# Patient Record
Sex: FEMALE | Race: WHITE | NOT HISPANIC OR LATINO | Employment: OTHER | URBAN - METROPOLITAN AREA
[De-identification: names, ages, dates, MRNs, and addresses within clinical notes are randomized per-mention and may not be internally consistent; named-entity substitution may affect disease eponyms.]

---

## 2017-01-03 ENCOUNTER — HOSPITAL ENCOUNTER (OUTPATIENT)
Dept: ULTRASOUND IMAGING | Facility: HOSPITAL | Age: 77
Discharge: HOME | End: 2017-01-03
Attending: INTERNAL MEDICINE

## 2017-01-03 ENCOUNTER — HOSPITAL ENCOUNTER (OUTPATIENT)
Dept: MAMMOGRAPHY | Facility: HOSPITAL | Age: 77
Discharge: HOME | End: 2017-01-03
Attending: INTERNAL MEDICINE | Admitting: INTERNAL MEDICINE

## 2017-01-03 DIAGNOSIS — N64.4 BREAST PAIN, LEFT: ICD-10-CM

## 2017-01-03 PROCEDURE — G0206 DX MAMMO INCL CAD UNI: HCPCS

## 2017-01-03 PROCEDURE — 76642 ULTRASOUND BREAST LIMITED: CPT

## 2017-01-16 DIAGNOSIS — N64.4 BREAST PAIN: Primary | ICD-10-CM

## 2017-03-03 ENCOUNTER — LAB (OUTPATIENT)
Dept: ONCOLOGY | Facility: HOSPITAL | Age: 77
End: 2017-03-03

## 2017-03-03 ENCOUNTER — OFFICE VISIT (OUTPATIENT)
Dept: ONCOLOGY | Facility: CLINIC | Age: 77
End: 2017-03-03

## 2017-03-03 VITALS
HEIGHT: 58 IN | DIASTOLIC BLOOD PRESSURE: 86 MMHG | SYSTOLIC BLOOD PRESSURE: 140 MMHG | TEMPERATURE: 98.5 F | HEART RATE: 71 BPM | RESPIRATION RATE: 12 BRPM | BODY MASS INDEX: 26.45 KG/M2 | OXYGEN SATURATION: 99 % | WEIGHT: 126 LBS

## 2017-03-03 DIAGNOSIS — D75.89 MACROCYTOSIS: ICD-10-CM

## 2017-03-03 DIAGNOSIS — D72.820 MONOCLONAL B-CELL LYMPHOCYTOSIS: ICD-10-CM

## 2017-03-03 DIAGNOSIS — D72.820 MONOCLONAL B-CELL LYMPHOCYTOSIS: Primary | ICD-10-CM

## 2017-03-03 LAB
ALBUMIN SERPL-MCNC: 4.2 G/DL (ref 3.5–5.2)
ALBUMIN/GLOB SERPL: 1.6 G/DL
ALP SERPL-CCNC: 42 U/L (ref 39–117)
ALT SERPL W P-5'-P-CCNC: 12 U/L (ref 1–33)
ANION GAP SERPL CALCULATED.3IONS-SCNC: 11.4 MMOL/L
AST SERPL-CCNC: 16 U/L (ref 1–32)
BASOPHILS # BLD AUTO: 0.05 10*3/MM3 (ref 0–0.2)
BASOPHILS NFR BLD AUTO: 0.7 % (ref 0–1.5)
BILIRUB SERPL-MCNC: 0.6 MG/DL (ref 0.1–1.2)
BUN BLD-MCNC: 16 MG/DL (ref 8–23)
BUN/CREAT SERPL: 15 (ref 7–25)
CALCIUM SPEC-SCNC: 9.9 MG/DL (ref 8.6–10.5)
CHLORIDE SERPL-SCNC: 103 MMOL/L (ref 98–107)
CO2 SERPL-SCNC: 27.6 MMOL/L (ref 22–29)
CREAT BLD-MCNC: 1.07 MG/DL (ref 0.57–1)
DEPRECATED RDW RBC AUTO: 47.1 FL (ref 37–54)
EOSINOPHIL # BLD AUTO: 0.08 10*3/MM3 (ref 0–0.7)
EOSINOPHIL NFR BLD AUTO: 1.1 % (ref 0.3–6.2)
ERYTHROCYTE [DISTWIDTH] IN BLOOD BY AUTOMATED COUNT: 12.4 % (ref 11.7–13)
GFR SERPL CREATININE-BSD FRML MDRD: 50 ML/MIN/1.73
GLOBULIN UR ELPH-MCNC: 2.7 GM/DL
GLUCOSE BLD-MCNC: 83 MG/DL (ref 65–99)
HCT VFR BLD AUTO: 46.4 % (ref 35.6–45.5)
HGB BLD-MCNC: 15.4 G/DL (ref 11.9–15.5)
IMM GRANULOCYTES # BLD: 0.03 10*3/MM3 (ref 0–0.03)
IMM GRANULOCYTES NFR BLD: 0.4 % (ref 0–0.5)
LYMPHOCYTES # BLD AUTO: 3.67 10*3/MM3 (ref 0.9–4.8)
LYMPHOCYTES NFR BLD AUTO: 52.7 % (ref 19.6–45.3)
MCH RBC QN AUTO: 34.3 PG (ref 26.9–32)
MCHC RBC AUTO-ENTMCNC: 33.2 G/DL (ref 32.4–36.3)
MCV RBC AUTO: 103.3 FL (ref 80.5–98.2)
MONOCYTES # BLD AUTO: 0.58 10*3/MM3 (ref 0.2–1.2)
MONOCYTES NFR BLD AUTO: 8.3 % (ref 5–12)
NEUTROPHILS # BLD AUTO: 2.55 10*3/MM3 (ref 1.9–8.1)
NEUTROPHILS NFR BLD AUTO: 36.8 % (ref 42.7–76)
NRBC BLD MANUAL-RTO: 0 /100 WBC (ref 0–0)
PLATELET # BLD AUTO: 195 10*3/MM3 (ref 140–500)
PMV BLD AUTO: 10.3 FL (ref 6–12)
POTASSIUM BLD-SCNC: 4 MMOL/L (ref 3.5–5.2)
PROT SERPL-MCNC: 6.9 G/DL (ref 6–8.5)
RBC # BLD AUTO: 4.49 10*6/MM3 (ref 3.9–5.2)
SODIUM BLD-SCNC: 142 MMOL/L (ref 136–145)
WBC NRBC COR # BLD: 6.96 10*3/MM3 (ref 4.5–10.7)

## 2017-03-03 PROCEDURE — 80053 COMPREHEN METABOLIC PANEL: CPT | Performed by: INTERNAL MEDICINE

## 2017-03-03 PROCEDURE — 85025 COMPLETE CBC W/AUTO DIFF WBC: CPT | Performed by: INTERNAL MEDICINE

## 2017-03-03 PROCEDURE — 99213 OFFICE O/P EST LOW 20 MIN: CPT | Performed by: INTERNAL MEDICINE

## 2017-03-03 PROCEDURE — 36415 COLL VENOUS BLD VENIPUNCTURE: CPT

## 2017-03-03 RX ORDER — FAMOTIDINE 20 MG/1
20 TABLET, FILM COATED ORAL 2 TIMES DAILY PRN
COMMUNITY
End: 2017-07-13

## 2017-03-03 NOTE — PROGRESS NOTES
Subjective     REASON FOR FOLLOW UP   B-cell monoclonal population in PB by flow  Macrocytosis                             HISTORY OF PRESENT ILLNESS:  The patient is a 76 y.o. year old female who is here for routine 6 month follow-up of an extremely early lymphoproliferative process with 16% monoclonal B cells in the bone marrow.  She is not on any treatment and seems to be doing well.  Her blood counts are stable with a normal white count with mild peripheral lymphocytosis.  She's had no lymphadenopathy no fevers chills or night sweats.   History of Present Illness     Past Medical History   Diagnosis Date   • Arthritis    • Bacterial vaginitis    • Bronchitis    • Colon polyps    • Dizziness    • Elevated lipids    • Fatigue    • GERD (gastroesophageal reflux disease)    • Headache    • Hematuria    • History of EKG      date?; borderline   • Hypovitaminosis D    • Lump, breast      Unable to remember which breast   • Macrocytosis    • Oral candidiasis    • Osteoporosis    • Postmenopausal bleeding    • Suprapubic tenderness    • Vaginal atrophy    • Yeast vaginitis         Past Surgical History   Procedure Laterality Date   • Foot surgery Right 1999   • Gum surgery  1997   • Breast surgery Left 2003     [facility]   • Cataract extraction  2009   • Breast biopsy Left      12-15 years ago in Dr. Hendricks's office        Current Outpatient Prescriptions on File Prior to Visit   Medication Sig Dispense Refill   • alendronate (FOSAMAX) 70 MG tablet Take 1 tablet by mouth Every 7 (Seven) Days. 12 tablet 3   • calcium carbonate-vitamin d 600-400 MG-UNIT per tablet Take 1 tablet by mouth daily.     • [DISCONTINUED] ranitidine (ZANTAC) 150 MG tablet Take 150 mg by mouth daily.       No current facility-administered medications on file prior to visit.         ALLERGIES:  No Known Allergies     Review of Systems   Constitutional: Negative for activity change, appetite change, fatigue, fever and unexpected weight change.   HENT:  "Negative for hearing loss, nosebleeds, trouble swallowing and voice change.    Eyes: Negative for visual disturbance.   Respiratory: Negative for cough, shortness of breath and wheezing.    Cardiovascular: Negative for chest pain and palpitations.   Gastrointestinal: Negative for abdominal pain, diarrhea, nausea and vomiting.   Genitourinary: Negative for difficulty urinating, frequency, hematuria and urgency.   Musculoskeletal: Negative for back pain and neck pain.   Skin: Negative for rash.   Neurological: Negative for dizziness, seizures, syncope and headaches.   Hematological: Negative for adenopathy. Does not bruise/bleed easily.   Psychiatric/Behavioral: Negative for behavioral problems. The patient is not nervous/anxious.         Objective     Vitals:    03/03/17 1012   BP: 140/86   Pulse: 71   Resp: 12   Temp: 98.5 °F (36.9 °C)   SpO2: 99%   Weight: 126 lb (57.2 kg)   Height: 57.68\" (146.5 cm)  Comment: NEW HT   PainSc: 0-No pain     Current Status 3/3/2017   ECOG score 0       Physical Exam   Constitutional: She is oriented to person, place, and time. She appears well-developed and well-nourished. No distress.   HENT:   Head: Normocephalic.   Eyes: Conjunctivae and EOM are normal. Pupils are equal, round, and reactive to light. No scleral icterus.   Neck: Normal range of motion. Neck supple. No JVD present. No thyromegaly present.   Cardiovascular: Normal rate and regular rhythm.  Exam reveals no gallop and no friction rub.    No murmur heard.  Pulmonary/Chest: Effort normal and breath sounds normal. She has no wheezes. She has no rales.   Abdominal: Soft. She exhibits no distension and no mass. There is no tenderness.   Musculoskeletal: Normal range of motion. She exhibits no edema or deformity.   Lymphadenopathy:     She has no cervical adenopathy.   Neurological: She is alert and oriented to person, place, and time. She has normal reflexes. No cranial nerve deficit.   Skin: Skin is warm and dry. No rash " noted. No erythema.   Psychiatric: She has a normal mood and affect. Her behavior is normal. Judgment normal.         RECENT LABS:  Hematology WBC   Date Value Ref Range Status   03/03/2017 6.96 4.50 - 10.70 10*3/mm3 Final   09/15/2015 8.60 4.50 - 10.70 K/Cumm Final     RBC   Date Value Ref Range Status   03/03/2017 4.49 3.90 - 5.20 10*6/mm3 Final   09/15/2015 4.23 3.90 - 5.20 Million Final     HEMOGLOBIN   Date Value Ref Range Status   03/03/2017 15.4 11.9 - 15.5 g/dL Final   09/15/2015 14.4 11.9 - 15.5 g/dL Final     HEMATOCRIT   Date Value Ref Range Status   03/03/2017 46.4 (H) 35.6 - 45.5 % Final   09/15/2015 42.2 35.6 - 45.5 % Final     PLATELETS   Date Value Ref Range Status   03/03/2017 195 140 - 500 10*3/mm3 Final   09/15/2015 222 140 - 500 K/Cumm Final      FLOW CYTOMETRY 8/16/16:  Small monoclonal B-cell population ( 16% ).     Assessment/Plan   1.  Mild chronic macrocytosis.  Stable  2.  Peripheral blood flow cytometry showing a very small (16%) monoclonal B-cell population.  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  The patient does not have any palpable lymphadenopathy at all and is having no symptoms.  Her blood counts are normal with the exception of mild peripheral lymphocytosis.    Plan  1.  Return in 6 months for routine follow-up including repeat CBC and serum chemistries.

## 2017-05-22 ENCOUNTER — OFFICE VISIT (OUTPATIENT)
Dept: FAMILY MEDICINE CLINIC | Facility: CLINIC | Age: 77
End: 2017-05-22

## 2017-05-22 VITALS
WEIGHT: 123.6 LBS | HEART RATE: 80 BPM | OXYGEN SATURATION: 99 % | TEMPERATURE: 98.7 F | HEIGHT: 60 IN | SYSTOLIC BLOOD PRESSURE: 120 MMHG | RESPIRATION RATE: 16 BRPM | DIASTOLIC BLOOD PRESSURE: 88 MMHG | BODY MASS INDEX: 24.26 KG/M2

## 2017-05-22 DIAGNOSIS — L03.211 CELLULITIS OF FACE: ICD-10-CM

## 2017-05-22 DIAGNOSIS — Z00.00 MEDICARE ANNUAL WELLNESS VISIT, INITIAL: Primary | ICD-10-CM

## 2017-05-22 PROCEDURE — G0439 PPPS, SUBSEQ VISIT: HCPCS | Performed by: NURSE PRACTITIONER

## 2017-05-22 PROCEDURE — 99213 OFFICE O/P EST LOW 20 MIN: CPT | Performed by: NURSE PRACTITIONER

## 2017-05-22 RX ORDER — CLINDAMYCIN HYDROCHLORIDE 300 MG/1
300 CAPSULE ORAL 3 TIMES DAILY
Qty: 30 CAPSULE | Refills: 0 | Status: SHIPPED | OUTPATIENT
Start: 2017-05-22 | End: 2017-07-13

## 2017-05-25 ENCOUNTER — TELEPHONE (OUTPATIENT)
Dept: FAMILY MEDICINE CLINIC | Facility: CLINIC | Age: 77
End: 2017-05-25

## 2017-05-25 DIAGNOSIS — L53.9 FACIAL ERYTHEMA: Primary | ICD-10-CM

## 2017-05-25 RX ORDER — DIAPER,BRIEF,INFANT-TODD,DISP
EACH MISCELLANEOUS 2 TIMES DAILY
Qty: 30 G | Refills: 0 | Status: SHIPPED | OUTPATIENT
Start: 2017-05-25 | End: 2017-07-13 | Stop reason: ALTCHOICE

## 2017-07-13 ENCOUNTER — OFFICE VISIT (OUTPATIENT)
Dept: GASTROENTEROLOGY | Facility: CLINIC | Age: 77
End: 2017-07-13

## 2017-07-13 VITALS
HEIGHT: 60 IN | DIASTOLIC BLOOD PRESSURE: 72 MMHG | SYSTOLIC BLOOD PRESSURE: 165 MMHG | HEART RATE: 77 BPM | WEIGHT: 120 LBS | BODY MASS INDEX: 23.56 KG/M2

## 2017-07-13 DIAGNOSIS — R53.81 MALAISE AND FATIGUE: ICD-10-CM

## 2017-07-13 DIAGNOSIS — R53.83 MALAISE AND FATIGUE: ICD-10-CM

## 2017-07-13 DIAGNOSIS — R12 HEARTBURN: Primary | ICD-10-CM

## 2017-07-13 DIAGNOSIS — R19.7 DIARRHEA, UNSPECIFIED TYPE: ICD-10-CM

## 2017-07-13 DIAGNOSIS — R14.0 BLOATING: ICD-10-CM

## 2017-07-13 PROCEDURE — 99213 OFFICE O/P EST LOW 20 MIN: CPT | Performed by: INTERNAL MEDICINE

## 2017-07-25 ENCOUNTER — TELEPHONE (OUTPATIENT)
Dept: GASTROENTEROLOGY | Facility: CLINIC | Age: 77
End: 2017-07-25

## 2017-07-25 NOTE — TELEPHONE ENCOUNTER
Mailed pt reminder letter/lab order to have C Diff, and Stool Occult tests ordered at last visit with Dr eMndez

## 2017-08-07 ENCOUNTER — TELEPHONE (OUTPATIENT)
Dept: GASTROENTEROLOGY | Facility: CLINIC | Age: 77
End: 2017-08-07

## 2017-08-07 ENCOUNTER — LAB (OUTPATIENT)
Dept: LAB | Facility: HOSPITAL | Age: 77
End: 2017-08-07

## 2017-08-07 DIAGNOSIS — R53.81 MALAISE AND FATIGUE: ICD-10-CM

## 2017-08-07 DIAGNOSIS — R14.0 BLOATING: ICD-10-CM

## 2017-08-07 DIAGNOSIS — R19.7 DIARRHEA, UNSPECIFIED TYPE: ICD-10-CM

## 2017-08-07 DIAGNOSIS — R53.83 MALAISE AND FATIGUE: ICD-10-CM

## 2017-08-07 DIAGNOSIS — R12 HEARTBURN: ICD-10-CM

## 2017-08-07 LAB
C DIFF TOX GENS STL QL NAA+PROBE: NEGATIVE
HEMOCCULT STL QL: NEGATIVE

## 2017-08-07 PROCEDURE — 82272 OCCULT BLD FECES 1-3 TESTS: CPT

## 2017-08-07 PROCEDURE — 87493 C DIFF AMPLIFIED PROBE: CPT

## 2017-08-07 NOTE — TELEPHONE ENCOUNTER
LVM with pt regarding normal stool studies (occult blood, C Diff) as reviewed by Dr Mendez.  Pt has f/u appt 8/17/17

## 2017-08-07 NOTE — TELEPHONE ENCOUNTER
----- Message from Kevin Mendez MD sent at 8/7/2017  3:06 PM EDT -----  Advise patient the results were normal.

## 2017-08-17 ENCOUNTER — OFFICE VISIT (OUTPATIENT)
Dept: GASTROENTEROLOGY | Facility: CLINIC | Age: 77
End: 2017-08-17

## 2017-08-17 VITALS
HEART RATE: 66 BPM | DIASTOLIC BLOOD PRESSURE: 86 MMHG | WEIGHT: 118 LBS | BODY MASS INDEX: 23.16 KG/M2 | HEIGHT: 60 IN | SYSTOLIC BLOOD PRESSURE: 155 MMHG

## 2017-08-17 DIAGNOSIS — K21.9 GASTROESOPHAGEAL REFLUX DISEASE, ESOPHAGITIS PRESENCE NOT SPECIFIED: Primary | ICD-10-CM

## 2017-08-17 DIAGNOSIS — R19.7 DIARRHEA, UNSPECIFIED TYPE: ICD-10-CM

## 2017-08-17 PROCEDURE — 99213 OFFICE O/P EST LOW 20 MIN: CPT | Performed by: INTERNAL MEDICINE

## 2017-08-17 NOTE — PROGRESS NOTES
Subjective   Tessie Mckeon is a 76 y.o. female is here today for follow-up.  Chief Complaint   Patient presents with   • Heartburn   • Bloated     History of Present Illness  Patient is doing much better.  Her diarrhea has resolved.  Her arthralgias have resolved.  Her heartburn is under acceptable control although she experiences occasional breakthrough symptoms late in the evening.  Hemoccult testing of her stool was negative.  The following portions of the patient's history were reviewed and updated as appropriate: allergies, current medications, past family history, past medical history, past social history, past surgical history and problem list.    Review of Systems   Respiratory: Negative for shortness of breath.    Cardiovascular: Negative for chest pain.   All other systems reviewed and are negative.      Objective   Physical Exam   Constitutional: She appears well-developed and well-nourished.   Nursing note and vitals reviewed.        Assessment/Plan   Problems Addressed this Visit        Digestive    GERD (gastroesophageal reflux disease) - Primary    Relevant Medications    famotidine-calcium carb-mag hydroxide (PEPCID COMPLETE) -165 MG chewable tablet    Diarrhea        The above symptoms have responded to conservative management and have resolved for now.  I would leave her be and see her back on an as-needed basis.

## 2017-09-14 ENCOUNTER — TELEPHONE (OUTPATIENT)
Dept: ONCOLOGY | Facility: HOSPITAL | Age: 77
End: 2017-09-14

## 2017-09-15 ENCOUNTER — OFFICE VISIT (OUTPATIENT)
Dept: ONCOLOGY | Facility: CLINIC | Age: 77
End: 2017-09-15

## 2017-09-15 ENCOUNTER — LAB (OUTPATIENT)
Dept: OTHER | Facility: HOSPITAL | Age: 77
End: 2017-09-15

## 2017-09-15 VITALS
HEART RATE: 71 BPM | RESPIRATION RATE: 16 BRPM | OXYGEN SATURATION: 99 % | BODY MASS INDEX: 26.24 KG/M2 | WEIGHT: 125 LBS | TEMPERATURE: 98.5 F | SYSTOLIC BLOOD PRESSURE: 147 MMHG | HEIGHT: 58 IN | DIASTOLIC BLOOD PRESSURE: 83 MMHG

## 2017-09-15 DIAGNOSIS — D75.89 MACROCYTOSIS: Primary | ICD-10-CM

## 2017-09-15 DIAGNOSIS — D72.820 MONOCLONAL B-CELL LYMPHOCYTOSIS: ICD-10-CM

## 2017-09-15 LAB
ALBUMIN SERPL-MCNC: 4 G/DL (ref 3.5–5.2)
ALBUMIN/GLOB SERPL: 1.5 G/DL
ALP SERPL-CCNC: 40 U/L (ref 39–117)
ALT SERPL W P-5'-P-CCNC: 11 U/L (ref 1–33)
ANION GAP SERPL CALCULATED.3IONS-SCNC: 11.3 MMOL/L
AST SERPL-CCNC: 16 U/L (ref 1–32)
BASOPHILS # BLD AUTO: 0.05 10*3/MM3 (ref 0–0.2)
BASOPHILS NFR BLD AUTO: 0.7 % (ref 0–1.5)
BILIRUB SERPL-MCNC: 0.6 MG/DL (ref 0.1–1.2)
BUN BLD-MCNC: 15 MG/DL (ref 8–23)
BUN/CREAT SERPL: 13.5 (ref 7–25)
CALCIUM SPEC-SCNC: 9.7 MG/DL (ref 8.6–10.5)
CHLORIDE SERPL-SCNC: 106 MMOL/L (ref 98–107)
CO2 SERPL-SCNC: 27.7 MMOL/L (ref 22–29)
CREAT BLD-MCNC: 1.11 MG/DL (ref 0.57–1)
DEPRECATED RDW RBC AUTO: 47.4 FL (ref 37–54)
EOSINOPHIL # BLD AUTO: 0.07 10*3/MM3 (ref 0–0.7)
EOSINOPHIL NFR BLD AUTO: 1 % (ref 0.3–6.2)
ERYTHROCYTE [DISTWIDTH] IN BLOOD BY AUTOMATED COUNT: 12.6 % (ref 11.7–13)
GFR SERPL CREATININE-BSD FRML MDRD: 47 ML/MIN/1.73
GLOBULIN UR ELPH-MCNC: 2.7 GM/DL
GLUCOSE BLD-MCNC: 106 MG/DL (ref 65–99)
HCT VFR BLD AUTO: 42.4 % (ref 35.6–45.5)
HGB BLD-MCNC: 14.3 G/DL (ref 11.9–15.5)
IMM GRANULOCYTES # BLD: 0.02 10*3/MM3 (ref 0–0.03)
IMM GRANULOCYTES NFR BLD: 0.3 % (ref 0–0.5)
LYMPHOCYTES # BLD AUTO: 3.68 10*3/MM3 (ref 0.9–4.8)
LYMPHOCYTES NFR BLD AUTO: 50.5 % (ref 19.6–45.3)
MCH RBC QN AUTO: 34.3 PG (ref 26.9–32)
MCHC RBC AUTO-ENTMCNC: 33.7 G/DL (ref 32.4–36.3)
MCV RBC AUTO: 101.7 FL (ref 80.5–98.2)
MONOCYTES # BLD AUTO: 0.52 10*3/MM3 (ref 0.2–1.2)
MONOCYTES NFR BLD AUTO: 7.1 % (ref 5–12)
NEUTROPHILS # BLD AUTO: 2.95 10*3/MM3 (ref 1.9–8.1)
NEUTROPHILS NFR BLD AUTO: 40.4 % (ref 42.7–76)
NRBC BLD MANUAL-RTO: 0 /100 WBC (ref 0–0)
PLATELET # BLD AUTO: 180 10*3/MM3 (ref 140–500)
PMV BLD AUTO: 10.1 FL (ref 6–12)
POTASSIUM BLD-SCNC: 4.2 MMOL/L (ref 3.5–5.2)
PROT SERPL-MCNC: 6.7 G/DL (ref 6–8.5)
RBC # BLD AUTO: 4.17 10*6/MM3 (ref 3.9–5.2)
SODIUM BLD-SCNC: 145 MMOL/L (ref 136–145)
WBC NRBC COR # BLD: 7.29 10*3/MM3 (ref 4.5–10.7)

## 2017-09-15 PROCEDURE — 36415 COLL VENOUS BLD VENIPUNCTURE: CPT

## 2017-09-15 PROCEDURE — 99213 OFFICE O/P EST LOW 20 MIN: CPT | Performed by: INTERNAL MEDICINE

## 2017-09-15 PROCEDURE — 85025 COMPLETE CBC W/AUTO DIFF WBC: CPT | Performed by: INTERNAL MEDICINE

## 2017-09-15 PROCEDURE — 80053 COMPREHEN METABOLIC PANEL: CPT | Performed by: INTERNAL MEDICINE

## 2017-09-15 NOTE — PROGRESS NOTES
Subjective     REASON FOR FOLLOW UP  B-cell monoclonal population in PB by flow  Macrocytosis                             HISTORY OF PRESENT ILLNESS:  The patient is a 77 y.o. year old female who is here for routine 6 month follow-up of an extremely early lymphoproliferative process with 16% monoclonal B cells in the blood.  She is not on any treatment and seems to be doing well.  Her blood counts are stable with a normal white count with mild peripheral lymphocytosis.  She's had no lymphadenopathy no fevers chills or night sweats.   History of Present Illness     Past Medical History:   Diagnosis Date   • Arthritis    • Bacterial vaginitis    • Bronchitis    • Colon polyps    • Dizziness    • Elevated lipids    • Fatigue    • GERD (gastroesophageal reflux disease)    • Headache    • Hematuria    • History of EKG     date?; borderline   • Hypovitaminosis D    • Lump, breast     Unable to remember which breast   • Macrocytosis    • Oral candidiasis    • Osteoporosis    • Postmenopausal bleeding    • Suprapubic tenderness    • Vaginal atrophy    • Yeast vaginitis         Past Surgical History:   Procedure Laterality Date   • BREAST BIOPSY Left     12-15 years ago in Dr. Hendricks's office   • BREAST SURGERY Left 2003    [facility]   • CATARACT EXTRACTION  2009   • COLONOSCOPY  08/03/2012    Dr Broderick   • EYE SURGERY     • FOOT SURGERY Right 1999   • GUM SURGERY  1997   • UPPER GASTROINTESTINAL ENDOSCOPY  02/2013        Current Outpatient Prescriptions on File Prior to Visit   Medication Sig Dispense Refill   • alendronate (FOSAMAX) 70 MG tablet Take 1 tablet by mouth Every 7 (Seven) Days. 12 tablet 3   • calcium carbonate-vitamin d 600-400 MG-UNIT per tablet Take 1 tablet by mouth daily.     • famotidine-calcium carb-mag hydroxide (PEPCID COMPLETE) -165 MG chewable tablet Chew 1 tablet Daily As Needed for Heartburn.     • Probiotic Product (PROBIOTIC PO) Take  by mouth.       No current facility-administered medications  "on file prior to visit.         ALLERGIES:    Allergies   Allergen Reactions   • Clindamycin/Lincomycin Other (See Comments)     Joint pain     • Proton Pump Inhibitors Other (See Comments)     Joint pain          Review of Systems   Constitutional: Negative for activity change, appetite change, fatigue, fever and unexpected weight change.   HENT: Negative for hearing loss, nosebleeds, trouble swallowing and voice change.    Eyes: Negative for visual disturbance.   Respiratory: Negative for cough, shortness of breath and wheezing.    Cardiovascular: Negative for chest pain and palpitations.   Gastrointestinal: Negative for abdominal pain, diarrhea, nausea and vomiting.   Genitourinary: Negative for difficulty urinating, frequency, hematuria and urgency.   Musculoskeletal: Negative for back pain and neck pain.   Skin: Negative for rash.   Neurological: Negative for dizziness, seizures, syncope and headaches.   Hematological: Negative for adenopathy. Does not bruise/bleed easily.   Psychiatric/Behavioral: Negative for behavioral problems. The patient is not nervous/anxious.         Objective     Vitals:    09/15/17 1015   BP: 147/83   Pulse: 71   Resp: 16   Temp: 98.5 °F (36.9 °C)   TempSrc: Oral   SpO2: 99%   Weight: 125 lb (56.7 kg)   Height: 57.68\" (146.5 cm)  Comment: WILL NEED A NEW HT 3/31/18   PainSc: 0-No pain     Current Status 9/15/2017   ECOG score 0       Physical Exam   Constitutional: She is oriented to person, place, and time. She appears well-developed and well-nourished. No distress.   HENT:   Head: Normocephalic.   Eyes: Conjunctivae and EOM are normal. Pupils are equal, round, and reactive to light. No scleral icterus.   Neck: Normal range of motion. Neck supple. No JVD present. No thyromegaly present.   Cardiovascular: Normal rate and regular rhythm.  Exam reveals no gallop and no friction rub.    No murmur heard.  Pulmonary/Chest: Effort normal and breath sounds normal. She has no wheezes. She has " no rales.   Abdominal: Soft. She exhibits no distension and no mass. There is no tenderness.   Musculoskeletal: Normal range of motion. She exhibits no edema or deformity.   Lymphadenopathy:     She has no cervical adenopathy.   Neurological: She is alert and oriented to person, place, and time. She has normal reflexes. No cranial nerve deficit.   Skin: Skin is warm and dry. No rash noted. No erythema.   Psychiatric: She has a normal mood and affect. Her behavior is normal. Judgment normal.         RECENT LABS:  Hematology WBC   Date Value Ref Range Status   09/15/2017 7.29 4.50 - 10.70 10*3/mm3 Final     RBC   Date Value Ref Range Status   09/15/2017 4.17 3.90 - 5.20 10*6/mm3 Final     Hemoglobin   Date Value Ref Range Status   09/15/2017 14.3 11.9 - 15.5 g/dL Final     Hematocrit   Date Value Ref Range Status   09/15/2017 42.4 35.6 - 45.5 % Final     Platelets   Date Value Ref Range Status   09/15/2017 180 140 - 500 10*3/mm3 Final      FLOW CYTOMETRY 8/16/16:  Small monoclonal B-cell population ( 16% ).     Assessment/Plan   1.  Mild chronic macrocytosis.  Stable  2.  Peripheral blood flow cytometry showing a very small (16%) monoclonal B-cell population.  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  The patient does not have any palpable lymphadenopathy at all and is having no symptoms.  Her blood counts are normal with the exception of mild peripheral lymphocytosis.    Plan  1.  Return in 6 months for routine follow-up including repeat CBC and serum chemistries.

## 2017-11-15 RX ORDER — ALENDRONATE SODIUM 70 MG/1
TABLET ORAL
Qty: 12 TABLET | Refills: 0 | Status: SHIPPED | OUTPATIENT
Start: 2017-11-15 | End: 2018-02-21 | Stop reason: SDUPTHER

## 2018-02-16 ENCOUNTER — TRANSCRIBE ORDERS (OUTPATIENT)
Dept: ADMINISTRATIVE | Facility: HOSPITAL | Age: 78
End: 2018-02-16

## 2018-02-16 DIAGNOSIS — Z12.31 SCREENING MAMMOGRAM, ENCOUNTER FOR: Primary | ICD-10-CM

## 2018-02-21 RX ORDER — ALENDRONATE SODIUM 70 MG/1
TABLET ORAL
Qty: 12 TABLET | Refills: 1 | Status: SHIPPED | OUTPATIENT
Start: 2018-02-21 | End: 2018-08-08 | Stop reason: SDUPTHER

## 2018-03-02 ENCOUNTER — HOSPITAL ENCOUNTER (OUTPATIENT)
Dept: MAMMOGRAPHY | Facility: HOSPITAL | Age: 78
Discharge: HOME | End: 2018-03-02
Attending: INTERNAL MEDICINE | Admitting: INTERNAL MEDICINE

## 2018-03-02 DIAGNOSIS — Z12.31 SCREENING MAMMOGRAM, ENCOUNTER FOR: ICD-10-CM

## 2018-03-02 PROCEDURE — 77063 BREAST TOMOSYNTHESIS BI: CPT

## 2018-03-02 PROCEDURE — 77067 SCR MAMMO BI INCL CAD: CPT

## 2018-03-13 ENCOUNTER — OFFICE VISIT (OUTPATIENT)
Dept: FAMILY MEDICINE CLINIC | Facility: CLINIC | Age: 78
End: 2018-03-13

## 2018-03-13 VITALS
HEIGHT: 60 IN | SYSTOLIC BLOOD PRESSURE: 138 MMHG | DIASTOLIC BLOOD PRESSURE: 80 MMHG | HEART RATE: 80 BPM | TEMPERATURE: 98.9 F | BODY MASS INDEX: 24.3 KG/M2 | OXYGEN SATURATION: 97 % | WEIGHT: 123.8 LBS

## 2018-03-13 DIAGNOSIS — M54.50 ACUTE LEFT-SIDED LOW BACK PAIN WITHOUT SCIATICA: Primary | ICD-10-CM

## 2018-03-13 DIAGNOSIS — M41.86 OTHER FORM OF SCOLIOSIS OF LUMBAR SPINE: ICD-10-CM

## 2018-03-13 PROCEDURE — 99213 OFFICE O/P EST LOW 20 MIN: CPT | Performed by: INTERNAL MEDICINE

## 2018-03-13 PROCEDURE — 72100 X-RAY EXAM L-S SPINE 2/3 VWS: CPT | Performed by: INTERNAL MEDICINE

## 2018-03-13 RX ORDER — CYCLOBENZAPRINE HCL 5 MG
5 TABLET ORAL 3 TIMES DAILY PRN
Qty: 60 TABLET | Refills: 1 | Status: SHIPPED | OUTPATIENT
Start: 2018-03-13 | End: 2019-02-22 | Stop reason: SDUPTHER

## 2018-03-13 NOTE — PROGRESS NOTES
Subjective   Tessie Mckeon is a 77 y.o. female.   Patient with lower lumbar pain more so on the left recently no specific injury  History of Present Illness   As above denies any trouble with urinating or bowel movements is more left just having hard time moving without discomfort.  No sciatica type complaints with this.    Review of Systems   Musculoskeletal: Positive for back pain.       Objective   Vitals:    03/13/18 0754   BP: 138/80   Pulse: 80   Temp: 98.9 °F (37.2 °C)   SpO2: 97%   Weight: 56.2 kg (123 lb 12.8 oz)     Physical Exam   Constitutional: She appears well-developed and well-nourished.   HENT:   Head: Normocephalic and atraumatic.   Eyes: Conjunctivae are normal. Pupils are equal, round, and reactive to light.   Cardiovascular: Normal rate, regular rhythm and normal heart sounds.    Pulmonary/Chest: Effort normal and breath sounds normal.   Abdominal: Soft. Bowel sounds are normal.   Musculoskeletal:   Mild discomfort palpation lower lumbar spine to the left laterally.  Gets 35° forward, 10° backwards, 30° left and right lateral flexion.  On the lateral flexion seem comfortable patient.   Neurological: She is alert.   Unremarkable gait and station.  Negative straight-leg raises bilaterally.  Knee jerks 2+ bilaterally   Nursing note and vitals reviewed.      No results found for: INR    Procedures x-ray lumbar spine obtained 2 views.  No comparison available.  Does have evidence of scoliosis to the left lumbar spine.  Significant narrowing at L5-S1 minimal degenerative changes otherwise perhaps narrowing at L2-L3 versus scoliosis producing this image.  No other significant bony or soft tissue abnormalities are noted.  Assessment/Plan   1.  Acute lumbar pain plan Flexeril 5 mg every 8 hours when necessary take plain Tylenol look at labs she does have a mildly elevated creatinine so we will have patient avoid NSAIDs.    2.  Scoliosis of lumbar spine plan observation for now if symptoms do not  improve  will anticipate imaging with either CT or MRI of lumbar spine.  Also above pain is not controlled with Tylenol lab give patient something else such as tramadol.    Patient does have a trip involved in early April for her she should be able to Go on said trip    Much of this encounter note is an electronic transcription/translation of spoken language to printed text.  The electronic translation of spoken language may permit erroneous, or at times, nonsensical words or phrases to be inadvertently transcribed.  Although I have reviewed the note for such errors, some may still exist.

## 2018-03-16 ENCOUNTER — APPOINTMENT (OUTPATIENT)
Dept: OTHER | Facility: HOSPITAL | Age: 78
End: 2018-03-16

## 2018-03-29 ENCOUNTER — OFFICE VISIT (OUTPATIENT)
Dept: ONCOLOGY | Facility: CLINIC | Age: 78
End: 2018-03-29

## 2018-03-29 ENCOUNTER — LAB (OUTPATIENT)
Dept: OTHER | Facility: HOSPITAL | Age: 78
End: 2018-03-29

## 2018-03-29 VITALS
HEIGHT: 60 IN | OXYGEN SATURATION: 93 % | RESPIRATION RATE: 14 BRPM | DIASTOLIC BLOOD PRESSURE: 80 MMHG | TEMPERATURE: 98.7 F | BODY MASS INDEX: 24.11 KG/M2 | WEIGHT: 122.8 LBS | HEART RATE: 83 BPM | SYSTOLIC BLOOD PRESSURE: 128 MMHG

## 2018-03-29 DIAGNOSIS — D75.89 MACROCYTOSIS: ICD-10-CM

## 2018-03-29 DIAGNOSIS — D72.820 MONOCLONAL B-CELL LYMPHOCYTOSIS: Primary | ICD-10-CM

## 2018-03-29 DIAGNOSIS — D72.820 MONOCLONAL B-CELL LYMPHOCYTOSIS: ICD-10-CM

## 2018-03-29 LAB
ALBUMIN SERPL-MCNC: 4 G/DL (ref 3.5–5.2)
ALBUMIN/GLOB SERPL: 1.5 G/DL
ALP SERPL-CCNC: 37 U/L (ref 39–117)
ALT SERPL W P-5'-P-CCNC: 10 U/L (ref 1–33)
ANION GAP SERPL CALCULATED.3IONS-SCNC: 11.6 MMOL/L
AST SERPL-CCNC: 15 U/L (ref 1–32)
BASOPHILS # BLD AUTO: 0.03 10*3/MM3 (ref 0–0.2)
BASOPHILS NFR BLD AUTO: 0.5 % (ref 0–1.5)
BILIRUB SERPL-MCNC: 0.5 MG/DL (ref 0.1–1.2)
BUN BLD-MCNC: 20 MG/DL (ref 8–23)
BUN/CREAT SERPL: 16.8 (ref 7–25)
CALCIUM SPEC-SCNC: 10.2 MG/DL (ref 8.6–10.5)
CHLORIDE SERPL-SCNC: 106 MMOL/L (ref 98–107)
CO2 SERPL-SCNC: 27.4 MMOL/L (ref 22–29)
CREAT BLD-MCNC: 1.19 MG/DL (ref 0.57–1)
DEPRECATED RDW RBC AUTO: 47.8 FL (ref 37–54)
EOSINOPHIL # BLD AUTO: 0.09 10*3/MM3 (ref 0–0.7)
EOSINOPHIL NFR BLD AUTO: 1.5 % (ref 0.3–6.2)
ERYTHROCYTE [DISTWIDTH] IN BLOOD BY AUTOMATED COUNT: 12.6 % (ref 11.7–13)
GFR SERPL CREATININE-BSD FRML MDRD: 44 ML/MIN/1.73
GLOBULIN UR ELPH-MCNC: 2.7 GM/DL
GLUCOSE BLD-MCNC: 88 MG/DL (ref 65–99)
HCT VFR BLD AUTO: 42.8 % (ref 35.6–45.5)
HGB BLD-MCNC: 14.3 G/DL (ref 11.9–15.5)
IMM GRANULOCYTES # BLD: 0.02 10*3/MM3 (ref 0–0.03)
IMM GRANULOCYTES NFR BLD: 0.3 % (ref 0–0.5)
LYMPHOCYTES # BLD AUTO: 3.62 10*3/MM3 (ref 0.9–4.8)
LYMPHOCYTES NFR BLD AUTO: 59.4 % (ref 19.6–45.3)
MCH RBC QN AUTO: 34.5 PG (ref 26.9–32)
MCHC RBC AUTO-ENTMCNC: 33.4 G/DL (ref 32.4–36.3)
MCV RBC AUTO: 103.1 FL (ref 80.5–98.2)
MONOCYTES # BLD AUTO: 0.48 10*3/MM3 (ref 0.2–1.2)
MONOCYTES NFR BLD AUTO: 7.9 % (ref 5–12)
NEUTROPHILS # BLD AUTO: 1.85 10*3/MM3 (ref 1.9–8.1)
NEUTROPHILS NFR BLD AUTO: 30.4 % (ref 42.7–76)
NRBC BLD MANUAL-RTO: 0 /100 WBC (ref 0–0)
PLATELET # BLD AUTO: 202 10*3/MM3 (ref 140–500)
PMV BLD AUTO: 10.2 FL (ref 6–12)
POTASSIUM BLD-SCNC: 4.5 MMOL/L (ref 3.5–5.2)
PROT SERPL-MCNC: 6.7 G/DL (ref 6–8.5)
RBC # BLD AUTO: 4.15 10*6/MM3 (ref 3.9–5.2)
SODIUM BLD-SCNC: 145 MMOL/L (ref 136–145)
WBC NRBC COR # BLD: 6.09 10*3/MM3 (ref 4.5–10.7)

## 2018-03-29 PROCEDURE — 36415 COLL VENOUS BLD VENIPUNCTURE: CPT

## 2018-03-29 PROCEDURE — 85007 BL SMEAR W/DIFF WBC COUNT: CPT | Performed by: INTERNAL MEDICINE

## 2018-03-29 PROCEDURE — 99213 OFFICE O/P EST LOW 20 MIN: CPT | Performed by: INTERNAL MEDICINE

## 2018-03-29 PROCEDURE — 85025 COMPLETE CBC W/AUTO DIFF WBC: CPT | Performed by: INTERNAL MEDICINE

## 2018-03-29 PROCEDURE — 80053 COMPREHEN METABOLIC PANEL: CPT | Performed by: INTERNAL MEDICINE

## 2018-03-29 NOTE — PROGRESS NOTES
Subjective     REASON FOR FOLLOW UP  B-cell monoclonal population in PB by flow  Macrocytosis                             HISTORY OF PRESENT ILLNESS:  The patient is a 77 y.o. year old female who is here for routine 6 month follow-up of an extremely early lymphoproliferative process with 16% monoclonal B cells in the blood.  She is not on any treatment and seems to be doing well.      Her blood counts are stable with a normal white count with mild peripheral lymphocytosis.  She's had no lymphadenopathy no fevers chills or night sweats.    Her only complaint today is of some back pain radiating to the left hip which sounds like it may be arthritic or a disc issue.  She is working with her primary care physician on this problem.   History of Present Illness     Past Medical History:   Diagnosis Date   • Arthritis    • Bacterial vaginitis    • Bronchitis    • Colon polyps    • Dizziness    • Elevated lipids    • Fatigue    • GERD (gastroesophageal reflux disease)    • Headache    • Hematuria    • History of EKG     date?; borderline   • Hypovitaminosis D    • Lump, breast     Unable to remember which breast   • Macrocytosis    • Oral candidiasis    • Osteoporosis    • Postmenopausal bleeding    • Suprapubic tenderness    • Vaginal atrophy    • Yeast vaginitis         Past Surgical History:   Procedure Laterality Date   • BREAST BIOPSY Left     12-15 years ago in Dr. Hendricks's office   • BREAST SURGERY Left 2003    [facility]   • CATARACT EXTRACTION  2009   • COLONOSCOPY  08/03/2012    Dr Broderick   • DILATATION AND CURETTAGE  09/21/2015   • EYE SURGERY     • FOOT SURGERY Right 1999   • GUM SURGERY  1997   • UPPER GASTROINTESTINAL ENDOSCOPY  02/2013        Current Outpatient Prescriptions on File Prior to Visit   Medication Sig Dispense Refill   • alendronate (FOSAMAX) 70 MG tablet TAKE 1 TABLET BY MOUTH EVERY 7 (SEVEN) DAYS. 12 tablet 1   • calcium carbonate-vitamin d 600-400 MG-UNIT per tablet Take 1 tablet by mouth daily.    "  • cyclobenzaprine (FLEXERIL) 5 MG tablet Take 1 tablet by mouth 3 (Three) Times a Day As Needed for Muscle Spasms. 60 tablet 1   • Probiotic Product (PROBIOTIC PO) Take  by mouth.       No current facility-administered medications on file prior to visit.         ALLERGIES:    Allergies   Allergen Reactions   • Clindamycin/Lincomycin Other (See Comments)     Joint pain     • Proton Pump Inhibitors Other (See Comments)     Joint pain          Review of Systems   Constitutional: Negative for activity change, appetite change, fatigue, fever and unexpected weight change.   HENT: Negative for hearing loss, nosebleeds, trouble swallowing and voice change.    Eyes: Negative for visual disturbance.   Respiratory: Negative for cough, shortness of breath and wheezing.    Cardiovascular: Negative for chest pain and palpitations.   Gastrointestinal: Negative for abdominal pain, diarrhea, nausea and vomiting.   Genitourinary: Negative for difficulty urinating, frequency, hematuria and urgency.   Musculoskeletal: Positive for back pain. Negative for neck pain.   Skin: Negative for rash.   Neurological: Negative for dizziness, seizures, syncope and headaches.   Hematological: Negative for adenopathy. Does not bruise/bleed easily.   Psychiatric/Behavioral: Negative for behavioral problems. The patient is not nervous/anxious.         Objective     Vitals:    03/29/18 0922   BP: 128/80   Pulse: 83   Resp: 14   Temp: 98.7 °F (37.1 °C)   TempSrc: Oral   SpO2: 93%   Weight: 55.7 kg (122 lb 12.8 oz)   Height: 152.4 cm (60\")   PainSc:   7   PainLoc: Back     Current Status 3/29/2018   ECOG score 0       Physical Exam   Constitutional: She is oriented to person, place, and time. She appears well-developed and well-nourished. No distress.   HENT:   Head: Normocephalic.   Eyes: Conjunctivae and EOM are normal. Pupils are equal, round, and reactive to light. No scleral icterus.   Neck: Normal range of motion. Neck supple. No JVD present. No " thyromegaly present.   Cardiovascular: Normal rate and regular rhythm.  Exam reveals no gallop and no friction rub.    No murmur heard.  Pulmonary/Chest: Effort normal and breath sounds normal. She has no wheezes. She has no rales.   Abdominal: Soft. She exhibits no distension and no mass. There is no tenderness.   Musculoskeletal: Normal range of motion. She exhibits no edema or deformity.   Lymphadenopathy:     She has no cervical adenopathy.   Neurological: She is alert and oriented to person, place, and time. She has normal reflexes. No cranial nerve deficit.   Skin: Skin is warm and dry. No rash noted. No erythema.   Psychiatric: She has a normal mood and affect. Her behavior is normal. Judgment normal.         RECENT LABS:  Hematology WBC   Date Value Ref Range Status   03/29/2018 6.09 4.50 - 10.70 10*3/mm3 Preliminary     RBC   Date Value Ref Range Status   03/29/2018 4.15 3.90 - 5.20 10*6/mm3 Preliminary     Hemoglobin   Date Value Ref Range Status   03/29/2018 14.3 11.9 - 15.5 g/dL Preliminary     Hematocrit   Date Value Ref Range Status   03/29/2018 42.8 35.6 - 45.5 % Preliminary     Platelets   Date Value Ref Range Status   03/29/2018 202 140 - 500 10*3/mm3 Preliminary      FLOW CYTOMETRY 8/16/16:  Small monoclonal B-cell population ( 16% ).     Assessment/Plan   1.  Mild chronic macrocytosis.  Stable  2.  Peripheral blood flow cytometry showing a very small (16%) monoclonal B-cell population.  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  The patient does not have any palpable lymphadenopathy at all and is having no symptoms.  Her blood counts are normal with the exception of mild peripheral lymphocytosis.    Plan  1.  Return in 6 months for routine follow-up including repeat CBC and serum chemistries. If there are no changes on her blood work over the next 6 months we may consider going to an annual follow-up schedule from there.

## 2018-08-08 RX ORDER — ALENDRONATE SODIUM 70 MG/1
TABLET ORAL
Qty: 12 TABLET | Refills: 0 | Status: SHIPPED | OUTPATIENT
Start: 2018-08-08 | End: 2018-11-08 | Stop reason: SDUPTHER

## 2018-09-13 ENCOUNTER — LAB (OUTPATIENT)
Dept: OTHER | Facility: HOSPITAL | Age: 78
End: 2018-09-13

## 2018-09-13 ENCOUNTER — OFFICE VISIT (OUTPATIENT)
Dept: ONCOLOGY | Facility: CLINIC | Age: 78
End: 2018-09-13

## 2018-09-13 VITALS
SYSTOLIC BLOOD PRESSURE: 146 MMHG | TEMPERATURE: 98.1 F | BODY MASS INDEX: 26.75 KG/M2 | HEART RATE: 68 BPM | RESPIRATION RATE: 14 BRPM | HEIGHT: 57 IN | DIASTOLIC BLOOD PRESSURE: 75 MMHG | OXYGEN SATURATION: 98 % | WEIGHT: 124 LBS

## 2018-09-13 DIAGNOSIS — D72.820 MONOCLONAL B-CELL LYMPHOCYTOSIS: Primary | ICD-10-CM

## 2018-09-13 DIAGNOSIS — D75.89 MACROCYTOSIS: ICD-10-CM

## 2018-09-13 DIAGNOSIS — D72.820 MONOCLONAL B-CELL LYMPHOCYTOSIS: ICD-10-CM

## 2018-09-13 LAB
ALBUMIN SERPL-MCNC: 4.1 G/DL (ref 3.5–5.2)
ALBUMIN/GLOB SERPL: 1.6 G/DL
ALP SERPL-CCNC: 34 U/L (ref 39–117)
ALT SERPL W P-5'-P-CCNC: 9 U/L (ref 1–33)
ANION GAP SERPL CALCULATED.3IONS-SCNC: 12 MMOL/L
AST SERPL-CCNC: 16 U/L (ref 1–32)
BASOPHILS # BLD AUTO: 0.04 10*3/MM3 (ref 0–0.2)
BASOPHILS NFR BLD AUTO: 0.5 % (ref 0–1.5)
BILIRUB SERPL-MCNC: 0.6 MG/DL (ref 0.1–1.2)
BUN BLD-MCNC: 15 MG/DL (ref 8–23)
BUN/CREAT SERPL: 12.7 (ref 7–25)
CALCIUM SPEC-SCNC: 10 MG/DL (ref 8.6–10.5)
CHLORIDE SERPL-SCNC: 105 MMOL/L (ref 98–107)
CO2 SERPL-SCNC: 25 MMOL/L (ref 22–29)
CREAT BLD-MCNC: 1.18 MG/DL (ref 0.57–1)
DEPRECATED RDW RBC AUTO: 47.3 FL (ref 37–54)
EOSINOPHIL # BLD AUTO: 0.11 10*3/MM3 (ref 0–0.7)
EOSINOPHIL NFR BLD AUTO: 1.5 % (ref 0.3–6.2)
ERYTHROCYTE [DISTWIDTH] IN BLOOD BY AUTOMATED COUNT: 12.5 % (ref 11.7–13)
GFR SERPL CREATININE-BSD FRML MDRD: 44 ML/MIN/1.73
GLOBULIN UR ELPH-MCNC: 2.6 GM/DL
GLUCOSE BLD-MCNC: 98 MG/DL (ref 65–99)
HCT VFR BLD AUTO: 42.6 % (ref 35.6–45.5)
HGB BLD-MCNC: 14.3 G/DL (ref 11.9–15.5)
IMM GRANULOCYTES # BLD: 0.02 10*3/MM3 (ref 0–0.03)
IMM GRANULOCYTES NFR BLD: 0.3 % (ref 0–0.5)
LYMPHOCYTES # BLD AUTO: 4.79 10*3/MM3 (ref 0.9–4.8)
LYMPHOCYTES NFR BLD AUTO: 65.5 % (ref 19.6–45.3)
MCH RBC QN AUTO: 34.5 PG (ref 26.9–32)
MCHC RBC AUTO-ENTMCNC: 33.6 G/DL (ref 32.4–36.3)
MCV RBC AUTO: 102.9 FL (ref 80.5–98.2)
MONOCYTES # BLD AUTO: 0.46 10*3/MM3 (ref 0.2–1.2)
MONOCYTES NFR BLD AUTO: 6.3 % (ref 5–12)
NEUTROPHILS # BLD AUTO: 1.89 10*3/MM3 (ref 1.9–8.1)
NEUTROPHILS NFR BLD AUTO: 25.9 % (ref 42.7–76)
NRBC BLD MANUAL-RTO: 0 /100 WBC (ref 0–0)
PLAT MORPH BLD: NORMAL
PLATELET # BLD AUTO: 177 10*3/MM3 (ref 140–500)
PMV BLD AUTO: 10.6 FL (ref 6–12)
POTASSIUM BLD-SCNC: 4.4 MMOL/L (ref 3.5–5.2)
PROT SERPL-MCNC: 6.7 G/DL (ref 6–8.5)
RBC # BLD AUTO: 4.14 10*6/MM3 (ref 3.9–5.2)
RBC MORPH BLD: NORMAL
SODIUM BLD-SCNC: 142 MMOL/L (ref 136–145)
WBC MORPH BLD: NORMAL
WBC NRBC COR # BLD: 7.31 10*3/MM3 (ref 4.5–10.7)

## 2018-09-13 PROCEDURE — 80053 COMPREHEN METABOLIC PANEL: CPT | Performed by: INTERNAL MEDICINE

## 2018-09-13 PROCEDURE — 36415 COLL VENOUS BLD VENIPUNCTURE: CPT

## 2018-09-13 PROCEDURE — 85007 BL SMEAR W/DIFF WBC COUNT: CPT | Performed by: INTERNAL MEDICINE

## 2018-09-13 PROCEDURE — 85025 COMPLETE CBC W/AUTO DIFF WBC: CPT | Performed by: INTERNAL MEDICINE

## 2018-09-13 PROCEDURE — 99213 OFFICE O/P EST LOW 20 MIN: CPT | Performed by: INTERNAL MEDICINE

## 2018-09-13 NOTE — PROGRESS NOTES
Subjective     REASON FOR FOLLOW UP:  B-cell monoclonal population in PB by flow  Macrocytosis                             HISTORY OF PRESENT ILLNESS:  The patient is a 78 y.o. year old female who is here for routine 6 month follow-up of an extremely early lymphoproliferative process with 16% monoclonal B cells in the blood.  She is not on any treatment and seems to be doing well.     Her blood counts are stable with a normal white count with mild peripheral lymphocytosis.  She's had no lymphadenopathy no fevers chills or night sweats.    At this point, I think we can go to an annual follow-up schedule.       History of Present Illness     Past Medical History:   Diagnosis Date   • Arthritis    • Bacterial vaginitis    • Bronchitis    • Colon polyps    • Dizziness    • Elevated lipids    • Fatigue    • GERD (gastroesophageal reflux disease)    • Headache    • Hematuria    • History of EKG     date?; borderline   • Hypovitaminosis D    • Lump, breast     Unable to remember which breast   • Macrocytosis    • Oral candidiasis    • Osteoporosis    • Postmenopausal bleeding    • Suprapubic tenderness    • Vaginal atrophy    • Yeast vaginitis         Past Surgical History:   Procedure Laterality Date   • BREAST BIOPSY Left     12-15 years ago in Dr. Hendricks's office   • BREAST SURGERY Left 2003    [facility]   • CATARACT EXTRACTION  2009   • COLONOSCOPY  08/03/2012    Dr Broderick   • DILATATION AND CURETTAGE  09/21/2015   • EYE SURGERY     • FOOT SURGERY Right 1999   • GUM SURGERY  1997   • UPPER GASTROINTESTINAL ENDOSCOPY  02/2013        ALLERGIES:    Allergies   Allergen Reactions   • Clindamycin/Lincomycin Other (See Comments)     Joint pain     • Proton Pump Inhibitors Other (See Comments)     Joint pain          Review of Systems   Constitutional: Negative for activity change, appetite change, fatigue, fever and unexpected weight change.   HENT: Negative for hearing loss, nosebleeds, trouble swallowing and voice change.   "  Eyes: Negative for visual disturbance.   Respiratory: Negative for cough, shortness of breath and wheezing.    Cardiovascular: Negative for chest pain and palpitations.   Gastrointestinal: Negative for abdominal pain, diarrhea, nausea and vomiting.   Genitourinary: Negative for difficulty urinating, frequency, hematuria and urgency.   Musculoskeletal: Positive for back pain. Negative for neck pain.   Skin: Negative for rash.   Neurological: Negative for dizziness, seizures, syncope and headaches.   Hematological: Negative for adenopathy. Does not bruise/bleed easily.   Psychiatric/Behavioral: Negative for behavioral problems. The patient is not nervous/anxious.         Objective     Vitals:    09/13/18 0938   BP: 146/75   Pulse: 68   Resp: 14   Temp: 98.1 °F (36.7 °C)   TempSrc: Oral   SpO2: 98%   Weight: 56.2 kg (124 lb)   Height: 146 cm (57.48\")  Comment: new height     Current Status 9/13/2018   ECOG score 0       Physical Exam   Constitutional: She is oriented to person, place, and time. She appears well-developed and well-nourished. No distress.   HENT:   Head: Normocephalic.   Eyes: Pupils are equal, round, and reactive to light. Conjunctivae and EOM are normal. No scleral icterus.   Neck: Normal range of motion. Neck supple. No JVD present. No thyromegaly present.   Cardiovascular: Normal rate and regular rhythm.  Exam reveals no gallop and no friction rub.    No murmur heard.  Pulmonary/Chest: Effort normal and breath sounds normal. She has no wheezes. She has no rales.   Abdominal: Soft. She exhibits no distension and no mass. There is no tenderness.   Musculoskeletal: Normal range of motion. She exhibits no edema or deformity.   Lymphadenopathy:     She has no cervical adenopathy.   Neurological: She is alert and oriented to person, place, and time. She has normal reflexes. No cranial nerve deficit.   Skin: Skin is warm and dry. No rash noted. No erythema.   Psychiatric: She has a normal mood and affect. " Her behavior is normal. Judgment normal.         RECENT LABS:  Hematology WBC   Date Value Ref Range Status   03/29/2018 6.09 4.50 - 10.70 10*3/mm3 Final     RBC   Date Value Ref Range Status   03/29/2018 4.15 3.90 - 5.20 10*6/mm3 Final     Hemoglobin   Date Value Ref Range Status   03/29/2018 14.3 11.9 - 15.5 g/dL Final     Hematocrit   Date Value Ref Range Status   03/29/2018 42.8 35.6 - 45.5 % Final     Platelets   Date Value Ref Range Status   03/29/2018 202 140 - 500 10*3/mm3 Final      FLOW CYTOMETRY 8/16/16:  Small monoclonal B-cell population ( 16% ).     Assessment/Plan   1.  Mild chronic macrocytosis.  Stable  2.  Peripheral blood flow cytometry showing a very small (16%) monoclonal B-cell population.  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  The patient does not have any palpable lymphadenopathy at all and is having no symptoms.  Her blood counts are normal with the exception of mild peripheral lymphocytosis.    Plan  1.  Return in 12 months for routine follow-up including repeat CBC and serum chemistries.

## 2018-11-08 RX ORDER — ALENDRONATE SODIUM 70 MG/1
TABLET ORAL
Qty: 12 TABLET | Refills: 1 | Status: SHIPPED | OUTPATIENT
Start: 2018-11-08 | End: 2019-09-24

## 2019-02-11 ENCOUNTER — TRANSCRIBE ORDERS (OUTPATIENT)
Dept: ADMINISTRATIVE | Facility: HOSPITAL | Age: 79
End: 2019-02-11

## 2019-02-11 DIAGNOSIS — Z12.39 BREAST SCREENING: Primary | ICD-10-CM

## 2019-02-22 ENCOUNTER — OFFICE VISIT (OUTPATIENT)
Dept: FAMILY MEDICINE CLINIC | Facility: CLINIC | Age: 79
End: 2019-02-22

## 2019-02-22 VITALS
TEMPERATURE: 98.7 F | BODY MASS INDEX: 24.54 KG/M2 | OXYGEN SATURATION: 98 % | HEART RATE: 80 BPM | WEIGHT: 125 LBS | SYSTOLIC BLOOD PRESSURE: 140 MMHG | HEIGHT: 60 IN | DIASTOLIC BLOOD PRESSURE: 90 MMHG

## 2019-02-22 DIAGNOSIS — N63.23 BREAST LUMP ON LEFT SIDE AT 5 O'CLOCK POSITION: ICD-10-CM

## 2019-02-22 DIAGNOSIS — R79.89 ELEVATED SERUM CREATININE: ICD-10-CM

## 2019-02-22 DIAGNOSIS — N89.8 VAGINAL IRRITATION: ICD-10-CM

## 2019-02-22 DIAGNOSIS — N64.4 BREAST PAIN, LEFT: Primary | ICD-10-CM

## 2019-02-22 DIAGNOSIS — R71.8 ELEVATED MCV: ICD-10-CM

## 2019-02-22 LAB
ALBUMIN SERPL-MCNC: 4.2 G/DL (ref 3.5–5.2)
ALBUMIN/GLOB SERPL: 1.4 G/DL
ALP SERPL-CCNC: 37 U/L (ref 39–117)
ALT SERPL W P-5'-P-CCNC: 16 U/L (ref 1–33)
ANION GAP SERPL CALCULATED.3IONS-SCNC: 12.7 MMOL/L
AST SERPL-CCNC: 20 U/L (ref 1–32)
BACTERIA UR QL AUTO: ABNORMAL /HPF
BILIRUB SERPL-MCNC: 0.7 MG/DL (ref 0.1–1.2)
BILIRUB UR QL STRIP: NEGATIVE
BUN BLD-MCNC: 15 MG/DL (ref 8–23)
BUN/CREAT SERPL: 11.8 (ref 7–25)
CALCIUM SPEC-SCNC: 10.2 MG/DL (ref 8.6–10.5)
CHLORIDE SERPL-SCNC: 102 MMOL/L (ref 98–107)
CLARITY UR: CLEAR
CO2 SERPL-SCNC: 25.3 MMOL/L (ref 22–29)
COLOR UR: YELLOW
CREAT BLD-MCNC: 1.27 MG/DL (ref 0.57–1)
DEPRECATED RDW RBC AUTO: 49.1 FL (ref 37–54)
EOSINOPHIL # BLD MANUAL: 0.15 10*3/MM3 (ref 0–0.4)
EOSINOPHIL NFR BLD MANUAL: 2.1 % (ref 0.3–6.2)
ERYTHROCYTE [DISTWIDTH] IN BLOOD BY AUTOMATED COUNT: 12.4 % (ref 12.3–15.4)
FOLATE SERPL-MCNC: 8.08 NG/ML (ref 4.78–24.2)
GFR SERPL CREATININE-BSD FRML MDRD: 40 ML/MIN/1.73
GLOBULIN UR ELPH-MCNC: 3 GM/DL
GLUCOSE BLD-MCNC: 101 MG/DL (ref 65–99)
GLUCOSE UR STRIP-MCNC: NEGATIVE MG/DL
HCT VFR BLD AUTO: 47.9 % (ref 34–46.6)
HGB BLD-MCNC: 15.1 G/DL (ref 12–15.9)
HGB UR QL STRIP.AUTO: ABNORMAL
KETONES UR QL STRIP: NEGATIVE
LEUKOCYTE ESTERASE UR QL STRIP.AUTO: ABNORMAL
LYMPHOCYTES # BLD MANUAL: 2.62 10*3/MM3 (ref 0.7–3.1)
LYMPHOCYTES NFR BLD MANUAL: 36.5 % (ref 19.6–45.3)
LYMPHOCYTES NFR BLD MANUAL: 5.2 % (ref 5–12)
MCH RBC QN AUTO: 33.4 PG (ref 26.6–33)
MCHC RBC AUTO-ENTMCNC: 31.5 G/DL (ref 31.5–35.7)
MCV RBC AUTO: 106 FL (ref 79–97)
MONOCYTES # BLD AUTO: 0.37 10*3/MM3 (ref 0.1–0.9)
NEUTROPHILS # BLD AUTO: 4.05 10*3/MM3 (ref 1.4–7)
NEUTROPHILS NFR BLD MANUAL: 56.3 % (ref 42.7–76)
NITRITE UR QL STRIP: NEGATIVE
PH UR STRIP.AUTO: 6 [PH] (ref 4.6–8)
PLAT MORPH BLD: NORMAL
PLATELET # BLD AUTO: 182 10*3/MM3 (ref 140–450)
PMV BLD AUTO: 11.3 FL (ref 6–12)
POTASSIUM BLD-SCNC: 4.5 MMOL/L (ref 3.5–5.2)
PROT SERPL-MCNC: 7.2 G/DL (ref 6–8.5)
PROT UR QL STRIP: NEGATIVE
RBC # BLD AUTO: 4.52 10*6/MM3 (ref 3.77–5.28)
RBC # UR: ABNORMAL /HPF
RBC MORPH BLD: NORMAL
REF LAB TEST METHOD: ABNORMAL
SMUDGE CELLS BLD QL SMEAR: NORMAL
SODIUM BLD-SCNC: 140 MMOL/L (ref 136–145)
SP GR UR STRIP: <=1.005 (ref 1–1.03)
SQUAMOUS #/AREA URNS HPF: ABNORMAL /HPF
UROBILINOGEN UR QL STRIP: ABNORMAL
VIT B12 BLD-MCNC: 360 PG/ML (ref 211–946)
WBC NRBC COR # BLD: 7.19 10*3/MM3 (ref 3.4–10.8)
WBC UR QL AUTO: ABNORMAL /HPF

## 2019-02-22 PROCEDURE — 85025 COMPLETE CBC W/AUTO DIFF WBC: CPT | Performed by: INTERNAL MEDICINE

## 2019-02-22 PROCEDURE — 80053 COMPREHEN METABOLIC PANEL: CPT | Performed by: INTERNAL MEDICINE

## 2019-02-22 PROCEDURE — 85007 BL SMEAR W/DIFF WBC COUNT: CPT | Performed by: INTERNAL MEDICINE

## 2019-02-22 PROCEDURE — 81001 URINALYSIS AUTO W/SCOPE: CPT | Performed by: INTERNAL MEDICINE

## 2019-02-22 PROCEDURE — 82746 ASSAY OF FOLIC ACID SERUM: CPT | Performed by: INTERNAL MEDICINE

## 2019-02-22 PROCEDURE — 36415 COLL VENOUS BLD VENIPUNCTURE: CPT | Performed by: INTERNAL MEDICINE

## 2019-02-22 PROCEDURE — 99214 OFFICE O/P EST MOD 30 MIN: CPT | Performed by: INTERNAL MEDICINE

## 2019-02-22 PROCEDURE — 82607 VITAMIN B-12: CPT | Performed by: INTERNAL MEDICINE

## 2019-02-22 RX ORDER — FLUCONAZOLE 150 MG/1
TABLET ORAL
Qty: 2 TABLET | Refills: 0 | Status: SHIPPED | OUTPATIENT
Start: 2019-02-22 | End: 2019-09-24

## 2019-02-22 NOTE — PROGRESS NOTES
Subjective   Tessie Mckeon is a 78 y.o. female.   Patient with breast pain left breast as well as vaginal discharge.  History of Present Illness   o on Nexium helping stomach  saw  Recent diarrhea tues getng better  l g vaginal irritation/discomfort.  Tried yeast medicine in the past which helps  Soreness left breast.  Of note she has 3 small breast lesions 1 of which coincides with the area that she senses is about 2-2.5 cm in size with some dimpling [place] a on the left breast at 5:00.  Family history of breast cancer in sister.  We will get a diagnostic mammogram left breast ultrasound and also referral to [facility] breast l surgery probably Dr. 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  Family history for heart disease breast cancer in a sister  Review of Systems   All other systems reviewed and are negative.      Objective   Vitals:    02/22/19 0830   BP: 140/90   Pulse: 80   Temp: 98.7 °F (37.1 °C)   SpO2: 98%   Weight: 56.7 kg (125 lb)     Physical Exam   Constitutional: She appears well-developed and well-nourished.   HENT:   Head: Normocephalic and atraumatic.   Eyes: Conjunctivae are normal. Pupils are equal, round, and reactive to light.   Cardiovascular: Normal rate, regular rhythm and normal heart sounds.   Pulmonary/Chest: Effort normal and breath sounds normal.   Abdominal: Soft. Bowel sounds are normal.   Musculoskeletal:   Left breast 3 discrete areas of pain.  She has a small nodule at 7:00 left breast perhaps 1 cm size at 7:00 mild soreness with this.  Also another lesion left breast at 5:00 larger lesion to-2.5 cm with some area where she mentions some soreness some dimpling in same area visible with patient sitting up.   There is one other small lesion perhaps 0.5 cm above this area left breast at about 3:00 also sore.  Right breast is normal both axilla are normal.  Patients with arms at her side and overhead we do notice minimal dimpling in the area at about 5:00 left breast just distal to the nipple area.   Neurological: She is alert.   Unremarkable gait and station   Skin: Skin is warm and dry.   Nursing note and vitals reviewed.      No results found for: INR    Procedures    Assessment/Plan   1.  Left breast pain    2.  Left breast lump both these were going to get an updated diagnostic mammogram with left breast ultrasound we will send patient to surgeon Dr. Langford for evaluation weight 2 after mammogram results are back before proceeding with surgical referral.    3.  Vaginal irritation plan Diflucan 150 x 1 with repeat tablet in 3 days time    4.  Elevated serum creatinine get updated BMP    5.  Elevated MCV get B12 and folate as well as CBC.    Much of this encounter note is an electronic transcription/translation of spoken language to printed text.  The electronic translation of spoken language may permit erroneous, or at times, nonsensical words or phrases to be inadvertently transcribed.  Although I have reviewed the note for such errors, some may still exist. If there are questions or for further clarification, please contact me.  Diflucan 150 x 1 repeat in 3 days

## 2019-02-25 DIAGNOSIS — R79.89 ELEVATED SERUM CREATININE: Primary | ICD-10-CM

## 2019-03-07 ENCOUNTER — APPOINTMENT (OUTPATIENT)
Dept: MAMMOGRAPHY | Facility: HOSPITAL | Age: 79
End: 2019-03-07

## 2019-03-08 ENCOUNTER — HOSPITAL ENCOUNTER (OUTPATIENT)
Dept: MAMMOGRAPHY | Facility: HOSPITAL | Age: 79
Discharge: HOME | End: 2019-03-08
Admitting: INTERNAL MEDICINE

## 2019-03-08 ENCOUNTER — HOSPITAL ENCOUNTER (OUTPATIENT)
Dept: ULTRASOUND IMAGING | Facility: HOSPITAL | Age: 79
Discharge: HOME | End: 2019-03-08

## 2019-03-08 DIAGNOSIS — N64.4 BREAST PAIN, LEFT: ICD-10-CM

## 2019-03-08 DIAGNOSIS — N63.23 BREAST LUMP ON LEFT SIDE AT 5 O'CLOCK POSITION: ICD-10-CM

## 2019-03-08 PROCEDURE — 77066 DX MAMMO INCL CAD BI: CPT

## 2019-03-08 PROCEDURE — 76642 ULTRASOUND BREAST LIMITED: CPT

## 2019-03-11 ENCOUNTER — LAB (OUTPATIENT)
Dept: FAMILY MEDICINE CLINIC | Facility: CLINIC | Age: 79
End: 2019-03-11

## 2019-03-11 DIAGNOSIS — R79.89 ELEVATED SERUM CREATININE: ICD-10-CM

## 2019-03-11 LAB
ANION GAP SERPL CALCULATED.3IONS-SCNC: 14.7 MMOL/L
BUN BLD-MCNC: 14 MG/DL (ref 8–23)
BUN/CREAT SERPL: 13.3 (ref 7–25)
CALCIUM SPEC-SCNC: 9.9 MG/DL (ref 8.6–10.5)
CHLORIDE SERPL-SCNC: 105 MMOL/L (ref 98–107)
CO2 SERPL-SCNC: 24.3 MMOL/L (ref 22–29)
CREAT BLD-MCNC: 1.05 MG/DL (ref 0.57–1)
GFR SERPL CREATININE-BSD FRML MDRD: 50 ML/MIN/1.73
GLUCOSE BLD-MCNC: 97 MG/DL (ref 65–99)
POTASSIUM BLD-SCNC: 4.6 MMOL/L (ref 3.5–5.2)
SODIUM BLD-SCNC: 144 MMOL/L (ref 136–145)

## 2019-03-11 PROCEDURE — 80048 BASIC METABOLIC PNL TOTAL CA: CPT | Performed by: INTERNAL MEDICINE

## 2019-03-11 PROCEDURE — 36415 COLL VENOUS BLD VENIPUNCTURE: CPT | Performed by: INTERNAL MEDICINE

## 2019-03-12 DIAGNOSIS — R79.89 ELEVATED SERUM CREATININE: ICD-10-CM

## 2019-03-12 DIAGNOSIS — N63.20 LEFT BREAST LUMP: Primary | ICD-10-CM

## 2019-03-22 ENCOUNTER — LAB (OUTPATIENT)
Dept: FAMILY MEDICINE CLINIC | Facility: CLINIC | Age: 79
End: 2019-03-22

## 2019-03-22 DIAGNOSIS — R79.89 ELEVATED SERUM CREATININE: ICD-10-CM

## 2019-03-22 LAB
COLLECT DURATION TIME UR: 24 HRS
CREAT CL 24H UR+SERPL-VRATE: 52 ML/MIN (ref 88–128)
CREAT CL 24H UR+SERPL-VRATE: 74.8 L/24 HR (ref 126.7–184.3)
CREAT UR-MCNC: 39.7 MG/DL
CREATINE 24H UR-MRATE: 0.64 G/24 HR (ref 0.7–1.6)

## 2019-03-22 PROCEDURE — 82575 CREATININE CLEARANCE TEST: CPT | Performed by: INTERNAL MEDICINE

## 2019-03-22 PROCEDURE — 81050 URINALYSIS VOLUME MEASURE: CPT | Performed by: INTERNAL MEDICINE

## 2019-03-26 ENCOUNTER — TELEPHONE (OUTPATIENT)
Dept: FAMILY MEDICINE CLINIC | Facility: CLINIC | Age: 79
End: 2019-03-26

## 2019-04-25 ENCOUNTER — TELEPHONE (OUTPATIENT)
Dept: FAMILY MEDICINE CLINIC | Facility: CLINIC | Age: 79
End: 2019-04-25

## 2019-04-25 RX ORDER — DEXLANSOPRAZOLE 60 MG/1
60 CAPSULE, DELAYED RELEASE ORAL DAILY
Qty: 1 CAPSULE | Refills: 0 | COMMUNITY
Start: 2019-04-25 | End: 2019-05-25

## 2019-04-25 NOTE — TELEPHONE ENCOUNTER
So noted we have her listed as taking Nexium instead.  Whether using Nexium or Dexilant which comes as 60 mg will be appropriate.

## 2019-05-17 NOTE — PATIENT INSTRUCTIONS
Use probiotic of choice (examples include Florastor, Align, [employer]ru[employer] Digestive [employer], Activia, VSL#3, or any of the common generics).     FODMAPS diet. Please refer to our handout, or cross-reference with an Internet search engine.     Consider short term use of Pepcid Complete (famotidine/calcium carbonate). Please refer to the labeling instructions on the bottle.     
negative

## 2019-05-22 ENCOUNTER — TRANSCRIBE ORDERS (OUTPATIENT)
Dept: ADMINISTRATIVE | Facility: HOSPITAL | Age: 79
End: 2019-05-22

## 2019-05-22 DIAGNOSIS — N28.1 COMPLEX RENAL CYST: Primary | ICD-10-CM

## 2019-05-29 ENCOUNTER — HOSPITAL ENCOUNTER (OUTPATIENT)
Dept: CT IMAGING | Facility: HOSPITAL | Age: 79
Discharge: HOME | End: 2019-05-29
Admitting: INTERNAL MEDICINE

## 2019-05-29 DIAGNOSIS — N28.1 COMPLEX RENAL CYST: ICD-10-CM

## 2019-05-29 LAB — CREAT BLDA-MCNC: 1.1 MG/DL (ref 0.6–1.3)

## 2019-05-29 PROCEDURE — 82565 ASSAY OF CREATININE: CPT

## 2019-05-29 PROCEDURE — 25010000002 IOPAMIDOL 61 % SOLUTION: Performed by: INTERNAL MEDICINE

## 2019-05-29 PROCEDURE — 74178 CT ABD&PLV WO CNTR FLWD CNTR: CPT

## 2019-05-29 RX ADMIN — IOPAMIDOL 85 ML: 612 INJECTION, SOLUTION INTRAVENOUS at 08:49

## 2019-09-24 ENCOUNTER — OFFICE VISIT (OUTPATIENT)
Dept: ONCOLOGY | Facility: CLINIC | Age: 79
End: 2019-09-24

## 2019-09-24 ENCOUNTER — LAB (OUTPATIENT)
Dept: OTHER | Facility: HOSPITAL | Age: 79
End: 2019-09-24

## 2019-09-24 VITALS — WEIGHT: 116.9 LBS | HEIGHT: 58 IN | BODY MASS INDEX: 24.54 KG/M2 | RESPIRATION RATE: 14 BRPM

## 2019-09-24 DIAGNOSIS — D72.820 MONOCLONAL B-CELL LYMPHOCYTOSIS: Primary | ICD-10-CM

## 2019-09-24 DIAGNOSIS — D75.89 MACROCYTOSIS: ICD-10-CM

## 2019-09-24 LAB
ALBUMIN SERPL-MCNC: 4.2 G/DL (ref 3.5–5.2)
ALBUMIN/GLOB SERPL: 1.7 G/DL
ALP SERPL-CCNC: 44 U/L (ref 39–117)
ALT SERPL W P-5'-P-CCNC: 12 U/L (ref 1–33)
ANION GAP SERPL CALCULATED.3IONS-SCNC: 9.4 MMOL/L (ref 5–15)
AST SERPL-CCNC: 16 U/L (ref 1–32)
BASOPHILS # BLD AUTO: 0.04 10*3/MM3 (ref 0–0.2)
BASOPHILS NFR BLD AUTO: 0.6 % (ref 0–1.5)
BILIRUB SERPL-MCNC: 0.5 MG/DL (ref 0.1–1.2)
BUN BLD-MCNC: 20 MG/DL (ref 8–23)
BUN/CREAT SERPL: 17.9 (ref 7–25)
CALCIUM SPEC-SCNC: 9.9 MG/DL (ref 8.6–10.5)
CHLORIDE SERPL-SCNC: 106 MMOL/L (ref 98–107)
CO2 SERPL-SCNC: 26.6 MMOL/L (ref 22–29)
CREAT BLD-MCNC: 1.12 MG/DL (ref 0.57–1)
DEPRECATED RDW RBC AUTO: 47.2 FL (ref 37–54)
EOSINOPHIL # BLD AUTO: 0.13 10*3/MM3 (ref 0–0.4)
EOSINOPHIL NFR BLD AUTO: 1.8 % (ref 0.3–6.2)
ERYTHROCYTE [DISTWIDTH] IN BLOOD BY AUTOMATED COUNT: 12.3 % (ref 12.3–15.4)
GFR SERPL CREATININE-BSD FRML MDRD: 47 ML/MIN/1.73
GLOBULIN UR ELPH-MCNC: 2.5 GM/DL
GLUCOSE BLD-MCNC: 101 MG/DL (ref 65–99)
HCT VFR BLD AUTO: 44.6 % (ref 34–46.6)
HGB BLD-MCNC: 14.8 G/DL (ref 12–15.9)
IMM GRANULOCYTES # BLD AUTO: 0.02 10*3/MM3 (ref 0–0.05)
IMM GRANULOCYTES NFR BLD AUTO: 0.3 % (ref 0–0.5)
LYMPHOCYTES # BLD AUTO: 3.83 10*3/MM3 (ref 0.7–3.1)
LYMPHOCYTES NFR BLD AUTO: 54 % (ref 19.6–45.3)
MCH RBC QN AUTO: 34.4 PG (ref 26.6–33)
MCHC RBC AUTO-ENTMCNC: 33.2 G/DL (ref 31.5–35.7)
MCV RBC AUTO: 103.7 FL (ref 79–97)
MONOCYTES # BLD AUTO: 0.4 10*3/MM3 (ref 0.1–0.9)
MONOCYTES NFR BLD AUTO: 5.6 % (ref 5–12)
NEUTROPHILS # BLD AUTO: 2.67 10*3/MM3 (ref 1.7–7)
NEUTROPHILS NFR BLD AUTO: 37.7 % (ref 42.7–76)
NRBC BLD AUTO-RTO: 0 /100 WBC (ref 0–0.2)
PLATELET # BLD AUTO: 200 10*3/MM3 (ref 140–450)
PMV BLD AUTO: 9.9 FL (ref 6–12)
POTASSIUM BLD-SCNC: 4.3 MMOL/L (ref 3.5–5.2)
PROT SERPL-MCNC: 6.7 G/DL (ref 6–8.5)
RBC # BLD AUTO: 4.3 10*6/MM3 (ref 3.77–5.28)
SODIUM BLD-SCNC: 142 MMOL/L (ref 136–145)
WBC NRBC COR # BLD: 7.09 10*3/MM3 (ref 3.4–10.8)

## 2019-09-24 PROCEDURE — 36415 COLL VENOUS BLD VENIPUNCTURE: CPT

## 2019-09-24 PROCEDURE — 80053 COMPREHEN METABOLIC PANEL: CPT | Performed by: INTERNAL MEDICINE

## 2019-09-24 PROCEDURE — 85025 COMPLETE CBC W/AUTO DIFF WBC: CPT | Performed by: INTERNAL MEDICINE

## 2019-09-24 PROCEDURE — 99214 OFFICE O/P EST MOD 30 MIN: CPT | Performed by: INTERNAL MEDICINE

## 2019-09-24 RX ORDER — RANITIDINE 150 MG/1
150 TABLET ORAL 2 TIMES DAILY
COMMUNITY
End: 2022-02-07

## 2019-09-24 NOTE — PROGRESS NOTES
Subjective     REASON FOR FOLLOW UP:  B-cell monoclonal population in PB by flow  Macrocytosis                             HISTORY OF PRESENT ILLNESS:  The patient is a 79 y.o. 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  She is not on any treatment and seems to be doing well.     Her blood counts are stable with a normal white count with mild peripheral lymphocytosis.  She's had no lymphadenopathy no fevers chills or night sweats.    She underwent a CT scan in late May for evaluation of renal mass.  This turned out to be a proteinaceous cyst (see report below).     History of Present Illness     Past Medical History:   Diagnosis Date   • Arthritis    • Bacterial vaginitis    • Bronchitis    • Colon polyps    • Dizziness    • Elevated lipids    • Fatigue    • GERD (gastroesophageal reflux disease)    • Headache    • Hematuria    • History of EKG     date?; borderline   • Hypovitaminosis D    • Lump, breast     Unable to remember which breast   • Macrocytosis    • Oral candidiasis    • Osteoporosis    • Postmenopausal bleeding    • Suprapubic tenderness    • Vaginal atrophy    • Yeast vaginitis         Past Surgical History:   Procedure Laterality Date   • BREAST BIOPSY Left     12-15 years ago in Dr. Hendricks's office   • BREAST SURGERY Left 2003    [facility]   • CATARACT EXTRACTION  2009   • COLONOSCOPY  08/03/2012    Dr Broderick   • DILATATION AND CURETTAGE  09/21/2015   • EYE SURGERY     • FOOT SURGERY Right 1999   • GUM SURGERY  1997   • UPPER GASTROINTESTINAL ENDOSCOPY  02/2013        ALLERGIES:    Allergies   Allergen Reactions   • Clindamycin/Lincomycin Other (See Comments)     Joint pain     • Proton Pump Inhibitors Other (See Comments)     Joint pain          Review of Systems   Constitutional: Negative for activity change, appetite change, fatigue, fever and unexpected weight change.   HENT: Negative for  "hearing loss, nosebleeds, trouble swallowing and voice change.    Eyes: Negative for visual disturbance.   Respiratory: Negative for cough, shortness of breath and wheezing.    Cardiovascular: Negative for chest pain and palpitations.   Gastrointestinal: Negative for abdominal pain, diarrhea, nausea and vomiting.   Genitourinary: Negative for difficulty urinating, frequency, hematuria and urgency.   Musculoskeletal: Positive for back pain. Negative for neck pain.   Skin: Negative for rash.   Neurological: Negative for dizziness, seizures, syncope and headaches.   Hematological: Negative for adenopathy. Does not bruise/bleed easily.   Psychiatric/Behavioral: Negative for behavioral problems. The patient is not nervous/anxious.         Objective     Vitals:    09/24/19 1618   Resp: 14   Weight: 53 kg (116 lb 14.4 oz)   Height: 147 cm (57.87\")  Comment: new ht.   PainSc: 0-No pain     Current Status 9/24/2019   ECOG score 0       Physical Exam   Constitutional: She is oriented to person, place, and time. She appears well-developed and well-nourished. No distress.   HENT:   Head: Normocephalic.   Eyes: Conjunctivae and EOM are normal. Pupils are equal, round, and reactive to light. No scleral icterus.   Neck: Normal range of motion. Neck supple. No JVD present. No thyromegaly present.   Cardiovascular: Normal rate and regular rhythm. Exam reveals no gallop and no friction rub.   No murmur heard.  Pulmonary/Chest: Effort normal and breath sounds normal. She has no wheezes. She has no rales.   Abdominal: Soft. She exhibits no distension and no mass. There is no tenderness.   Musculoskeletal: Normal range of motion. She exhibits no edema or deformity.   Lymphadenopathy:     She has no cervical adenopathy.   Neurological: She is alert and oriented to person, place, and time. She has normal reflexes. No cranial nerve deficit.   Skin: Skin is warm and dry. No rash noted. No erythema.   Psychiatric: She has a normal mood and " affect. Her behavior is normal. Judgment normal.         RECENT LABS:  Hematology WBC   Date Value Ref Range Status   09/24/2019 7.09 3.40 - 10.80 10*3/mm3 Final     RBC   Date Value Ref Range Status   09/24/2019 4.30 3.77 - 5.28 10*6/mm3 Final     Hemoglobin   Date Value Ref Range Status   09/24/2019 14.8 12.0 - 15.9 g/dL Final     Hematocrit   Date Value Ref Range Status   09/24/2019 44.6 34.0 - 46.6 % Final     Platelets   Date Value Ref Range Status   09/24/2019 200 140 - 450 10*3/mm3 Final      FLOW CYTOMETRY 8/16/16:  Small monoclonal B-cell population ( 16% ).       CT ABDOMEN AND PELVIS WITH AND WITHOUT IV CONTRAST 5/29/2019  IMPRESSION:  1.  Asymmetric urothelial enhancement on the right with fat stranding  surrounding the right collecting system and along the right proximal  ureter. Findings are concerning for pyelonephritis in the appropriate  clinical context and correlation with urinalysis is recommended. In the  absence of UTI, other differential considerations include neoplasm and  urologic referral would be indicated in this clinical context.  2.  Simple and proteinaceous cysts within the left kidney, as before.  There are innumerable hypodense, somewhat lobulated lesions within the  liver, as seen on 02/20/2013, and favored to be benign.  3.  Other findings as above.    Assessment/Plan   1.  Mild chronic macrocytosis.  Stable  2.  Peripheral blood flow cytometry showing a very small (16%) monoclonal B-cell population.  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  The patient does not have any palpable lymphadenopathy at all and is having no symptoms.  Her blood counts are normal with the exception of mild peripheral lymphocytosis.    Plan  1.  Return in 12 months for routine follow-up including repeat CBC and serum chemistries.

## 2019-11-27 ENCOUNTER — TELEPHONE (OUTPATIENT)
Dept: FAMILY MEDICINE CLINIC | Facility: CLINIC | Age: 79
End: 2019-11-27

## 2020-03-04 ENCOUNTER — TRANSCRIBE ORDERS (OUTPATIENT)
Dept: ADMINISTRATIVE | Facility: HOSPITAL | Age: 80
End: 2020-03-04

## 2020-03-04 DIAGNOSIS — Z12.39 SCREENING BREAST EXAMINATION: Primary | ICD-10-CM

## 2020-05-19 ENCOUNTER — HOSPITAL ENCOUNTER (OUTPATIENT)
Dept: MAMMOGRAPHY | Facility: HOSPITAL | Age: 80
Discharge: HOME | End: 2020-05-19
Admitting: INTERNAL MEDICINE

## 2020-05-19 DIAGNOSIS — Z12.39 SCREENING BREAST EXAMINATION: ICD-10-CM

## 2020-05-19 PROCEDURE — 77067 SCR MAMMO BI INCL CAD: CPT

## 2020-05-19 PROCEDURE — 77063 BREAST TOMOSYNTHESIS BI: CPT

## 2020-09-29 ENCOUNTER — LAB (OUTPATIENT)
Dept: OTHER | Facility: HOSPITAL | Age: 80
End: 2020-09-29

## 2020-09-29 ENCOUNTER — OFFICE VISIT (OUTPATIENT)
Dept: ONCOLOGY | Facility: CLINIC | Age: 80
End: 2020-09-29

## 2020-09-29 VITALS
OXYGEN SATURATION: 95 % | BODY MASS INDEX: 25.61 KG/M2 | TEMPERATURE: 97.1 F | HEIGHT: 58 IN | DIASTOLIC BLOOD PRESSURE: 77 MMHG | WEIGHT: 122 LBS | HEART RATE: 60 BPM | SYSTOLIC BLOOD PRESSURE: 167 MMHG | RESPIRATION RATE: 16 BRPM

## 2020-09-29 DIAGNOSIS — D72.820 MONOCLONAL B-CELL LYMPHOCYTOSIS: ICD-10-CM

## 2020-09-29 DIAGNOSIS — D75.89 MACROCYTOSIS: ICD-10-CM

## 2020-09-29 DIAGNOSIS — D72.820 MONOCLONAL B-CELL LYMPHOCYTOSIS: Primary | ICD-10-CM

## 2020-09-29 LAB
ALBUMIN SERPL-MCNC: 4.3 G/DL (ref 3.5–5.2)
ALBUMIN/GLOB SERPL: 1.7 G/DL
ALP SERPL-CCNC: 42 U/L (ref 39–117)
ALT SERPL W P-5'-P-CCNC: 10 U/L (ref 1–33)
ANION GAP SERPL CALCULATED.3IONS-SCNC: 9.3 MMOL/L (ref 5–15)
AST SERPL-CCNC: 17 U/L (ref 1–32)
BASOPHILS # BLD AUTO: 0.03 10*3/MM3 (ref 0–0.2)
BASOPHILS NFR BLD AUTO: 0.3 % (ref 0–1.5)
BILIRUB SERPL-MCNC: 1 MG/DL (ref 0–1.2)
BUN SERPL-MCNC: 15 MG/DL (ref 8–23)
BUN/CREAT SERPL: 13.5 (ref 7–25)
CALCIUM SPEC-SCNC: 9.8 MG/DL (ref 8.6–10.5)
CHLORIDE SERPL-SCNC: 106 MMOL/L (ref 98–107)
CO2 SERPL-SCNC: 26.7 MMOL/L (ref 22–29)
CREAT SERPL-MCNC: 1.11 MG/DL (ref 0.57–1)
DEPRECATED RDW RBC AUTO: 46.3 FL (ref 37–54)
EOSINOPHIL # BLD AUTO: 0.05 10*3/MM3 (ref 0–0.4)
EOSINOPHIL NFR BLD AUTO: 0.5 % (ref 0.3–6.2)
ERYTHROCYTE [DISTWIDTH] IN BLOOD BY AUTOMATED COUNT: 12.4 % (ref 12.3–15.4)
GFR SERPL CREATININE-BSD FRML MDRD: 47 ML/MIN/1.73
GLOBULIN UR ELPH-MCNC: 2.5 GM/DL
GLUCOSE SERPL-MCNC: 103 MG/DL (ref 65–99)
HCT VFR BLD AUTO: 43.7 % (ref 34–46.6)
HGB BLD-MCNC: 14.8 G/DL (ref 12–15.9)
IMM GRANULOCYTES # BLD AUTO: 0.03 10*3/MM3 (ref 0–0.05)
IMM GRANULOCYTES NFR BLD AUTO: 0.3 % (ref 0–0.5)
LYMPHOCYTES # BLD AUTO: 5.33 10*3/MM3 (ref 0.7–3.1)
LYMPHOCYTES # BLD MANUAL: 5.14 10*3/MM3 (ref 0.7–3.1)
LYMPHOCYTES NFR BLD AUTO: 52.9 % (ref 19.6–45.3)
LYMPHOCYTES NFR BLD MANUAL: 1 % (ref 5–12)
LYMPHOCYTES NFR BLD MANUAL: 51 % (ref 19.6–45.3)
MACROCYTES BLD QL SMEAR: ABNORMAL
MCH RBC QN AUTO: 34.7 PG (ref 26.6–33)
MCHC RBC AUTO-ENTMCNC: 33.9 G/DL (ref 31.5–35.7)
MCV RBC AUTO: 102.3 FL (ref 79–97)
MONOCYTES # BLD AUTO: 0.1 10*3/MM3 (ref 0.1–0.9)
MONOCYTES # BLD AUTO: 0.53 10*3/MM3 (ref 0.1–0.9)
MONOCYTES NFR BLD AUTO: 5.3 % (ref 5–12)
NEUTROPHILS # BLD AUTO: 4.83 10*3/MM3 (ref 1.7–7)
NEUTROPHILS NFR BLD AUTO: 4.1 10*3/MM3 (ref 1.7–7)
NEUTROPHILS NFR BLD AUTO: 40.7 % (ref 42.7–76)
NEUTROPHILS NFR BLD MANUAL: 48 % (ref 42.7–76)
NRBC BLD AUTO-RTO: 0 /100 WBC (ref 0–0.2)
PLAT MORPH BLD: NORMAL
PLATELET # BLD AUTO: 203 10*3/MM3 (ref 140–450)
PMV BLD AUTO: 10.2 FL (ref 6–12)
POTASSIUM SERPL-SCNC: 4.2 MMOL/L (ref 3.5–5.2)
PROT SERPL-MCNC: 6.8 G/DL (ref 6–8.5)
RBC # BLD AUTO: 4.27 10*6/MM3 (ref 3.77–5.28)
SODIUM SERPL-SCNC: 142 MMOL/L (ref 136–145)
WBC # BLD AUTO: 10.07 10*3/MM3 (ref 3.4–10.8)
WBC MORPH BLD: NORMAL

## 2020-09-29 PROCEDURE — 85025 COMPLETE CBC W/AUTO DIFF WBC: CPT | Performed by: INTERNAL MEDICINE

## 2020-09-29 PROCEDURE — 99213 OFFICE O/P EST LOW 20 MIN: CPT | Performed by: INTERNAL MEDICINE

## 2020-09-29 PROCEDURE — 85007 BL SMEAR W/DIFF WBC COUNT: CPT | Performed by: INTERNAL MEDICINE

## 2020-09-29 PROCEDURE — 36415 COLL VENOUS BLD VENIPUNCTURE: CPT

## 2020-09-29 PROCEDURE — 80053 COMPREHEN METABOLIC PANEL: CPT | Performed by: INTERNAL MEDICINE

## 2020-09-29 RX ORDER — FAMOTIDINE 20 MG/1
20 TABLET, FILM COATED ORAL 2 TIMES DAILY
COMMUNITY
End: 2022-05-19 | Stop reason: DRUGHIGH

## 2020-09-29 RX ORDER — HEPATITIS A VACCINE 1440 [IU]/ML
INJECTION, SUSPENSION INTRAMUSCULAR
COMMUNITY
End: 2022-05-19 | Stop reason: ALTCHOICE

## 2020-09-29 NOTE — PROGRESS NOTES
Subjective     REASON FOR FOLLOW UP:  B-cell monoclonal population in PB by flow  Macrocytosis                             HISTORY OF PRESENT ILLNESS:  The patient is a 80 y.o. 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  She is not on any treatment and seems to be doing well.     Her blood counts are stable with a normal white count with mild peripheral lymphocytosis.  She's had no lymphadenopathy no fevers chills or night sweats.         History of Present Illness     Past Medical History:   Diagnosis Date   • Arthritis    • Bacterial vaginitis    • Bronchitis    • Colon polyps    • Dizziness    • Elevated lipids    • Fatigue    • GERD (gastroesophageal reflux disease)    • Headache    • Hematuria    • History of EKG     date?; borderline   • Hypovitaminosis D    • Lump, breast     Unable to remember which breast   • Macrocytosis    • Oral candidiasis    • Osteoporosis    • Postmenopausal bleeding    • Suprapubic tenderness    • Vaginal atrophy    • Yeast vaginitis         Past Surgical History:   Procedure Laterality Date   • BREAST EXCISIONAL BIOPSY Left     12-15 years ago in Dr. Hendricks's office   • BREAST SURGERY Left 2003    [facility]   • CATARACT EXTRACTION  2009   • COLONOSCOPY  08/03/2012    Dr Broderick   • DILATATION AND CURETTAGE  09/21/2015   • EYE SURGERY     • FOOT SURGERY Right 1999   • GUM SURGERY  1997   • UPPER GASTROINTESTINAL ENDOSCOPY  02/2013        ALLERGIES:    Allergies   Allergen Reactions   • Clindamycin/Lincomycin Other (See Comments)     Joint pain     • Proton Pump Inhibitors Other (See Comments)     Joint pain          Review of Systems   Constitutional: Negative for activity change, appetite change, fatigue, fever and unexpected weight change.   HENT: Negative for hearing loss, nosebleeds, trouble swallowing and voice change.    Eyes: Negative for visual disturbance.   Respiratory:  "Negative for cough, shortness of breath and wheezing.    Cardiovascular: Negative for chest pain and palpitations.   Gastrointestinal: Negative for abdominal pain, diarrhea, nausea and vomiting.   Genitourinary: Negative for difficulty urinating, frequency, hematuria and urgency.   Musculoskeletal: Positive for back pain. Negative for neck pain.   Skin: Negative for rash.   Neurological: Negative for dizziness, seizures, syncope and headaches.   Hematological: Negative for adenopathy. Does not bruise/bleed easily.   Psychiatric/Behavioral: Negative for behavioral problems. The patient is not nervous/anxious.         Objective     Vitals:    09/29/20 1305   BP: 167/77   Pulse: 60   Resp: 16   Temp: 97.1 °F (36.2 °C)   TempSrc: Skin   SpO2: 95%   Weight: 55.3 kg (122 lb)   Height: 146.2 cm (57.56\")  Comment: NEW   PainSc: 0-No pain     Current Status 9/29/2020   ECOG score 0       Physical Exam   Constitutional: She is oriented to person, place, and time. She appears well-developed. No distress.   HENT:   Head: Normocephalic.   Eyes: Pupils are equal, round, and reactive to light. Conjunctivae are normal. No scleral icterus.   Neck: Normal range of motion. Neck supple. No JVD present. No thyromegaly present.   Cardiovascular: Normal rate and regular rhythm. Exam reveals no gallop and no friction rub.   No murmur heard.  Pulmonary/Chest: Effort normal and breath sounds normal. She has no wheezes. She has no rales.   Abdominal: Soft. She exhibits no distension and no mass. There is no abdominal tenderness.   Musculoskeletal: Normal range of motion. No deformity.   Lymphadenopathy:     She has no cervical adenopathy.   Neurological: She is alert and oriented to person, place, and time. She has normal reflexes. No cranial nerve deficit.   Skin: Skin is warm and dry. No rash noted. No erythema.   Psychiatric: Her behavior is normal. Judgment normal.         RECENT LABS:  Hematology WBC   Date Value Ref Range Status "   09/29/2020 10.07 3.40 - 10.80 10*3/mm3 Final     RBC   Date Value Ref Range Status   09/29/2020 4.27 3.77 - 5.28 10*6/mm3 Final     Hemoglobin   Date Value Ref Range Status   09/29/2020 14.8 12.0 - 15.9 g/dL Final     Hematocrit   Date Value Ref Range Status   09/29/2020 43.7 34.0 - 46.6 % Final     Platelets   Date Value Ref Range Status   09/29/2020 203 140 - 450 10*3/mm3 Final      FLOW CYTOMETRY 8/16/16:  Small monoclonal B-cell population ( 16% ).           Assessment/Plan   1.  Mild chronic macrocytosis.  Stable  2.  Peripheral blood flow cytometry showing a very small (16%) monoclonal B-cell population.  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  The patient does not have any palpable lymphadenopathy at all and is having no symptoms.  Her blood counts are normal with the exception of mild peripheral lymphocytosis.    Plan  1.  Return in 12 months for routine follow-up including repeat CBC and serum chemistries.

## 2021-01-27 ENCOUNTER — TELEPHONE (OUTPATIENT)
Dept: ONCOLOGY | Facility: CLINIC | Age: 81
End: 2021-01-27

## 2021-01-29 ENCOUNTER — TELEPHONE (OUTPATIENT)
Dept: ONCOLOGY | Facility: CLINIC | Age: 81
End: 2021-01-29

## 2021-01-29 NOTE — TELEPHONE ENCOUNTER
Called the patient to let her know that Dr. Roque recommended Dr. Warren at [facility] for a PCP. She did not answer, but I left my direct number for her to call back with any questions.

## 2021-03-09 DIAGNOSIS — Z23 IMMUNIZATION DUE: ICD-10-CM

## 2021-05-14 ENCOUNTER — TRANSCRIBE ORDERS (OUTPATIENT)
Dept: ADMINISTRATIVE | Facility: HOSPITAL | Age: 81
End: 2021-05-14

## 2021-05-14 DIAGNOSIS — Z12.31 SCREENING MAMMOGRAM, ENCOUNTER FOR: Primary | ICD-10-CM

## 2021-07-16 ENCOUNTER — HOSPITAL ENCOUNTER (OUTPATIENT)
Dept: MAMMOGRAPHY | Facility: HOSPITAL | Age: 81
Discharge: HOME | End: 2021-07-16
Admitting: INTERNAL MEDICINE

## 2021-07-16 DIAGNOSIS — Z12.31 SCREENING MAMMOGRAM, ENCOUNTER FOR: ICD-10-CM

## 2021-07-16 PROCEDURE — 77067 SCR MAMMO BI INCL CAD: CPT

## 2021-07-16 PROCEDURE — 77063 BREAST TOMOSYNTHESIS BI: CPT

## 2021-09-28 ENCOUNTER — OFFICE VISIT (OUTPATIENT)
Dept: ONCOLOGY | Facility: CLINIC | Age: 81
End: 2021-09-28

## 2021-09-28 ENCOUNTER — LAB (OUTPATIENT)
Dept: OTHER | Facility: HOSPITAL | Age: 81
End: 2021-09-28

## 2021-09-28 VITALS
DIASTOLIC BLOOD PRESSURE: 68 MMHG | OXYGEN SATURATION: 94 % | HEIGHT: 58 IN | HEART RATE: 74 BPM | RESPIRATION RATE: 18 BRPM | WEIGHT: 124.7 LBS | TEMPERATURE: 97.5 F | BODY MASS INDEX: 26.17 KG/M2 | SYSTOLIC BLOOD PRESSURE: 149 MMHG

## 2021-09-28 DIAGNOSIS — D75.89 MACROCYTOSIS: ICD-10-CM

## 2021-09-28 DIAGNOSIS — D72.820 MONOCLONAL B-CELL LYMPHOCYTOSIS: Primary | ICD-10-CM

## 2021-09-28 LAB
ALBUMIN SERPL-MCNC: 4.1 G/DL (ref 3.5–5.2)
ALBUMIN/GLOB SERPL: 1.6 G/DL
ALP SERPL-CCNC: 49 U/L (ref 39–117)
ALT SERPL W P-5'-P-CCNC: 9 U/L (ref 1–33)
ANION GAP SERPL CALCULATED.3IONS-SCNC: 9.3 MMOL/L (ref 5–15)
AST SERPL-CCNC: 14 U/L (ref 1–32)
BASOPHILS # BLD AUTO: 0.04 10*3/MM3 (ref 0–0.2)
BASOPHILS NFR BLD AUTO: 0.4 % (ref 0–1.5)
BILIRUB SERPL-MCNC: 0.5 MG/DL (ref 0–1.2)
BUN SERPL-MCNC: 21 MG/DL (ref 8–23)
BUN/CREAT SERPL: 17.6 (ref 7–25)
CALCIUM SPEC-SCNC: 9.7 MG/DL (ref 8.6–10.5)
CHLORIDE SERPL-SCNC: 106 MMOL/L (ref 98–107)
CO2 SERPL-SCNC: 25.7 MMOL/L (ref 22–29)
CREAT SERPL-MCNC: 1.19 MG/DL (ref 0.57–1)
DEPRECATED RDW RBC AUTO: 47.3 FL (ref 37–54)
EOSINOPHIL # BLD AUTO: 0.1 10*3/MM3 (ref 0–0.4)
EOSINOPHIL NFR BLD AUTO: 1.1 % (ref 0.3–6.2)
ERYTHROCYTE [DISTWIDTH] IN BLOOD BY AUTOMATED COUNT: 12.6 % (ref 12.3–15.4)
GFR SERPL CREATININE-BSD FRML MDRD: 43 ML/MIN/1.73
GLOBULIN UR ELPH-MCNC: 2.5 GM/DL
GLUCOSE SERPL-MCNC: 126 MG/DL (ref 65–99)
HCT VFR BLD AUTO: 43.8 % (ref 34–46.6)
HGB BLD-MCNC: 14.4 G/DL (ref 12–15.9)
IMM GRANULOCYTES # BLD AUTO: 0.02 10*3/MM3 (ref 0–0.05)
IMM GRANULOCYTES NFR BLD AUTO: 0.2 % (ref 0–0.5)
LYMPHOCYTES # BLD AUTO: 4.78 10*3/MM3 (ref 0.7–3.1)
LYMPHOCYTES NFR BLD AUTO: 53.5 % (ref 19.6–45.3)
MCH RBC QN AUTO: 33.7 PG (ref 26.6–33)
MCHC RBC AUTO-ENTMCNC: 32.9 G/DL (ref 31.5–35.7)
MCV RBC AUTO: 102.6 FL (ref 79–97)
MONOCYTES # BLD AUTO: 0.64 10*3/MM3 (ref 0.1–0.9)
MONOCYTES NFR BLD AUTO: 7.2 % (ref 5–12)
NEUTROPHILS NFR BLD AUTO: 3.35 10*3/MM3 (ref 1.7–7)
NEUTROPHILS NFR BLD AUTO: 37.6 % (ref 42.7–76)
NRBC BLD AUTO-RTO: 0 /100 WBC (ref 0–0.2)
PLATELET # BLD AUTO: 186 10*3/MM3 (ref 140–450)
PMV BLD AUTO: 10.1 FL (ref 6–12)
POTASSIUM SERPL-SCNC: 4.2 MMOL/L (ref 3.5–5.2)
PROT SERPL-MCNC: 6.6 G/DL (ref 6–8.5)
RBC # BLD AUTO: 4.27 10*6/MM3 (ref 3.77–5.28)
SODIUM SERPL-SCNC: 141 MMOL/L (ref 136–145)
WBC # BLD AUTO: 8.93 10*3/MM3 (ref 3.4–10.8)

## 2021-09-28 PROCEDURE — 85025 COMPLETE CBC W/AUTO DIFF WBC: CPT | Performed by: INTERNAL MEDICINE

## 2021-09-28 PROCEDURE — 99213 OFFICE O/P EST LOW 20 MIN: CPT | Performed by: INTERNAL MEDICINE

## 2021-09-28 PROCEDURE — 36415 COLL VENOUS BLD VENIPUNCTURE: CPT

## 2021-09-28 PROCEDURE — 80053 COMPREHEN METABOLIC PANEL: CPT | Performed by: INTERNAL MEDICINE

## 2021-09-28 RX ORDER — SUCRALFATE 1 G/1
TABLET ORAL
COMMUNITY
Start: 2021-07-19

## 2021-09-28 NOTE — PROGRESS NOTES
Subjective     REASON FOR FOLLOW UP:  B-cell monoclonal population in PB by flow  Macrocytosis                             HISTORY OF PRESENT ILLNESS:  The patient is a 81 y.o. 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  She is not on any treatment and seems to be doing well.     Her blood counts are stable with a normal white count with mild peripheral lymphocytosis.  Hemoglobin is normal at 14.4 and platelets are normal at 186,000.  Her MCV remains elevated at 102.6.     She has received 2 doses of the Pfizer Covid vaccine in February 2021.    She has not had any other major health issues this past year.  He is planning to get both the Covid booster and flu shot this year.     History of Present Illness     Past Medical History:   Diagnosis Date   • Arthritis    • Bacterial vaginitis    • Bronchitis    • Colon polyps    • Dizziness    • Elevated lipids    • Fatigue    • GERD (gastroesophageal reflux disease)    • Headache    • Hematuria    • History of EKG     date?; borderline   • Hypovitaminosis D    • Lump, breast     Unable to remember which breast   • Macrocytosis    • Oral candidiasis    • Osteoporosis    • Postmenopausal bleeding    • Suprapubic tenderness    • Vaginal atrophy    • Yeast vaginitis         Past Surgical History:   Procedure Laterality Date   • BREAST EXCISIONAL BIOPSY Left     12-15 years ago in Dr. Hendricks's office   • BREAST SURGERY Left 2003    [facility]   • CATARACT EXTRACTION  2009   • COLONOSCOPY  10/23/2017    Dr Broderick   • DILATATION AND CURETTAGE  09/21/2015   • EYE SURGERY     • FOOT SURGERY Right 1999   • GUM SURGERY  1997   • UPPER GASTROINTESTINAL ENDOSCOPY  02/01/2021    EULALIA HERNANDEZ MD        ALLERGIES:    Allergies   Allergen Reactions   • Clindamycin/Lincomycin Other (See Comments)     Joint pain     • Proton Pump Inhibitors Other (See Comments)     Joint pain       "    Review of Systems   Constitutional: Negative for activity change, appetite change, fatigue, fever and unexpected weight change.   HENT: Negative for hearing loss, nosebleeds, trouble swallowing and voice change.    Eyes: Negative for visual disturbance.   Respiratory: Negative for cough, shortness of breath and wheezing.    Cardiovascular: Negative for chest pain and palpitations.   Gastrointestinal: Negative for abdominal pain, diarrhea, nausea and vomiting.   Genitourinary: Negative for difficulty urinating, frequency, hematuria and urgency.   Musculoskeletal: Positive for back pain. Negative for neck pain.   Skin: Negative for rash.   Neurological: Negative for dizziness, seizures, syncope and headaches.   Hematological: Negative for adenopathy. Does not bruise/bleed easily.   Psychiatric/Behavioral: Negative for behavioral problems. The patient is not nervous/anxious.         Objective     Vitals:    09/28/21 1321   BP: 149/68   Pulse: 74   Resp: 18   Temp: 97.5 °F (36.4 °C)   TempSrc: Temporal   SpO2: 94%   Weight: 56.6 kg (124 lb 11.2 oz)   Height: 146.2 cm (57.56\")   PainSc: 0-No pain     Current Status 9/28/2021   ECOG score 0       Physical Exam   Constitutional: She is oriented to person, place, and time. She appears well-developed. No distress.   HENT:   Head: Normocephalic.   Eyes: Pupils are equal, round, and reactive to light. Conjunctivae are normal. No scleral icterus.   Neck: No JVD present. No thyromegaly present.   Cardiovascular: Normal rate and regular rhythm. Exam reveals no gallop and no friction rub.   No murmur heard.  Pulmonary/Chest: Effort normal and breath sounds normal. She has no wheezes. She has no rales.   Abdominal: Soft. She exhibits no distension and no mass. There is no abdominal tenderness.   Musculoskeletal: Normal range of motion. No deformity.   Lymphadenopathy:     She has no cervical adenopathy.   Neurological: She is alert and oriented to person, place, and time. She " has normal reflexes. No cranial nerve deficit.   Skin: Skin is warm and dry. No rash noted. No erythema.   Psychiatric: Her behavior is normal. Judgment normal.       I have reexamined the patient and the results are consistent with the previously documented exam. Darrel Roque MD     RECENT LABS:  Hematology WBC   Date Value Ref Range Status   09/28/2021 8.93 3.40 - 10.80 10*3/mm3 Final     RBC   Date Value Ref Range Status   09/28/2021 4.27 3.77 - 5.28 10*6/mm3 Final     Hemoglobin   Date Value Ref Range Status   09/28/2021 14.4 12.0 - 15.9 g/dL Final     Hematocrit   Date Value Ref Range Status   09/28/2021 43.8 34.0 - 46.6 % Final     Platelets   Date Value Ref Range Status   09/28/2021 186 140 - 450 10*3/mm3 Final      FLOW CYTOMETRY 8/16/16:  Small monoclonal B-cell population ( 16% ).           Assessment/Plan   1.  Mild chronic macrocytosis.  Stable  2.  Peripheral blood flow cytometry showing a very small (16%) monoclonal B-cell population.  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  The patient does not have any palpable lymphadenopathy at all and is having no symptoms.  Her blood counts are normal with the exception of mild peripheral lymphocytosis.    Plan  1.  We reviewed the results of the labs from today with the patient and provided her printed copy of the CBC report.  2.  Return in 12 months for routine follow-up including repeat CBC and serum chemistries.   3.  We did encourage her to get the Covid booster and a flu shot and she was already planning to do so.

## 2022-05-19 ENCOUNTER — OFFICE VISIT (OUTPATIENT)
Dept: FAMILY MEDICINE CLINIC | Facility: CLINIC | Age: 82
End: 2022-05-19

## 2022-05-19 VITALS
BODY MASS INDEX: 24.8 KG/M2 | OXYGEN SATURATION: 98 % | HEIGHT: 59 IN | WEIGHT: 123 LBS | SYSTOLIC BLOOD PRESSURE: 110 MMHG | DIASTOLIC BLOOD PRESSURE: 70 MMHG | HEART RATE: 76 BPM | TEMPERATURE: 97.1 F

## 2022-05-19 DIAGNOSIS — K21.00 GASTROESOPHAGEAL REFLUX DISEASE WITH ESOPHAGITIS WITHOUT HEMORRHAGE: ICD-10-CM

## 2022-05-19 DIAGNOSIS — Z13.220 LIPID SCREENING: ICD-10-CM

## 2022-05-19 DIAGNOSIS — R05.9 COUGH: ICD-10-CM

## 2022-05-19 DIAGNOSIS — R79.9 ABNORMAL FINDING OF BLOOD CHEMISTRY, UNSPECIFIED: ICD-10-CM

## 2022-05-19 DIAGNOSIS — Z00.00 MEDICARE ANNUAL WELLNESS VISIT, SUBSEQUENT: Primary | ICD-10-CM

## 2022-05-19 DIAGNOSIS — E55.9 VITAMIN D DEFICIENCY: ICD-10-CM

## 2022-05-19 PROBLEM — B02.9 SHINGLES: Status: ACTIVE | Noted: 2022-05-19

## 2022-05-19 PROBLEM — E11.9 TYPE 2 DIABETES MELLITUS, WITHOUT LONG-TERM CURRENT USE OF INSULIN (HCC): Status: RESOLVED | Noted: 2022-05-19 | Resolved: 2022-05-19

## 2022-05-19 PROBLEM — E11.9 TYPE 2 DIABETES MELLITUS, WITHOUT LONG-TERM CURRENT USE OF INSULIN: Status: ACTIVE | Noted: 2022-05-19

## 2022-05-19 PROCEDURE — 1170F FXNL STATUS ASSESSED: CPT | Performed by: INTERNAL MEDICINE

## 2022-05-19 PROCEDURE — 1126F AMNT PAIN NOTED NONE PRSNT: CPT | Performed by: INTERNAL MEDICINE

## 2022-05-19 PROCEDURE — 99214 OFFICE O/P EST MOD 30 MIN: CPT | Performed by: INTERNAL MEDICINE

## 2022-05-19 PROCEDURE — G0439 PPPS, SUBSEQ VISIT: HCPCS | Performed by: INTERNAL MEDICINE

## 2022-05-19 PROCEDURE — 1159F MED LIST DOCD IN RCRD: CPT | Performed by: INTERNAL MEDICINE

## 2022-05-19 PROCEDURE — 91305 COVID-19 (PFIZER) 12+ YRS: CPT | Performed by: INTERNAL MEDICINE

## 2022-05-19 PROCEDURE — 0054A COVID-19 (PFIZER) 12+ YRS: CPT | Performed by: INTERNAL MEDICINE

## 2022-05-19 RX ORDER — FAMOTIDINE 40 MG/1
40 TABLET, FILM COATED ORAL NIGHTLY
Qty: 90 TABLET | Refills: 1 | Status: SHIPPED | OUTPATIENT
Start: 2022-05-19 | End: 2022-11-29

## 2022-05-19 RX ORDER — BENZONATATE 100 MG/1
CAPSULE ORAL
Qty: 30 CAPSULE | Refills: 3 | Status: SHIPPED | OUTPATIENT
Start: 2022-05-19 | End: 2022-09-20

## 2022-05-19 NOTE — PATIENT INSTRUCTIONS
Medicare Wellness  Personal Prevention Plan of Service     Date of Office Visit:    Encounter Provider:  Aaron Penaloza MD  Place of Service:  [facility]  Patient Name: Tessie Mckeon  :  1940    As part of the Medicare Wellness portion of your visit today, we are providing you with this personalized preventive plan of services (PPPS). This plan is based upon recommendations of the [place] Preventive Services Task Force (USPSTF) and the Advisory Committee on Immunization Practices (ACIP).    This lists the preventive care services that should be considered, and provides dates of when you are due. Items listed as completed are up-to-date and do not require any further intervention.    Health Maintenance   Topic Date Due    Pneumococcal Vaccine 65+ (1 - PCV) 2003    ZOSTER VACCINE (2 of 3) 2010    DXA SCAN  2017    INFLUENZA VACCINE  2022    ANNUAL WELLNESS VISIT  2023    MAMMOGRAM  2023    TDAP/TD VACCINES (2 - Td or Tdap) 2027    COVID-19 Vaccine  Completed       No orders of the defined types were placed in this encounter.      No follow-ups on file.

## 2022-05-19 NOTE — PROGRESS NOTES
QUICK REFERENCE INFORMATION:  The ABCs of the Annual Wellness Visit    Subsequent Medicare Wellness Visit patient was seen for Medicare wellness exam.  Patient was seen for gastroesophageal reflux.  Patient is unable to take a PPI.  Patient was placed on Pepcid 20 mg twice daily.  Patient is still having heartburn.  Patient was advised to take 40 mg at bedtime of famotidine.  Patient was advised to use the sucralfate 1 g in 8 ounces of water slurry 4 times daily as needed heartburn.  Patient does have vitamin D deficiency and labs are being drawn today.  Patient also had a cough and wheeze that clears productive cough.  Patient does have history of allergies.  Patient was given a list of antihistamines and given Tessalon Perles to take as needed.  Patient will have labs and follow-up in 6 months.    Dictated utilizing Dragon dictation. If there are questions or for further clarification, please contact me.    HEALTH RISK ASSESSMENT    1940    Recent Hospitalizations:  No hospitalization(s) within the last year..        Current Medical Providers:  Patient Care Team:  Aaron Penaloza MD as PCP - General (Internal Medicine)  Darrel Roque MD as Consulting Physician (Hematology and Oncology)  Kevin Mendez MD (Inactive) as Referring Physician (Gastroenterology)  Ernesto Bennett MD as Surgeon (General Surgery)  Ino Mena Jr., MD (Inactive) as Referring Physician (Internal Medicine)        Smoking Status:  Social History     Tobacco Use   Smoking Status Never Smoker   Smokeless Tobacco Not on file       Alcohol Consumption:  Social History     Substance and Sexual Activity   Alcohol Use No       Depression Screen:   PHQ-2/PHQ-9 Depression Screening 5/19/2022   Retired PHQ-9 Total Score -   Retired Total Score -   Little Interest or Pleasure in Doing Things 0-->not at all   Feeling Down, Depressed or Hopeless 0-->not at all   PHQ-9: Brief Depression Severity Measure Score 0       Health Habits and  Functional and Cognitive Screening:  Functional & Cognitive Status 5/19/2022   Do you have difficulty preparing food and eating? No   Do you have difficulty bathing yourself, getting dressed or grooming yourself? No   Do you have difficulty using the toilet? No   Do you have difficulty moving around from place to place? No   Do you have trouble with steps or getting out of a bed or a chair? No   Current Diet Unhealthy Diet   Dental Exam Up to date   Eye Exam Up to date   Exercise (times per week) 3 times per week   Current Exercises Include Aerobics   Current Exercise Activities Include -   Do you need help using the phone?  No   Are you deaf or do you have serious difficulty hearing?  No   Do you need help with transportation? Yes   Do you need help shopping? No   Do you need help preparing meals?  No   Do you need help with housework?  No   Do you need help with laundry? No   Do you need help taking your medications? No   Do you need help managing money? No   Do you ever drive or ride in a car without wearing a seat belt? No   Have you felt unusual stress, anger or loneliness in the last month? No   Who do you live with? Spouse   If you need help, do you have trouble finding someone available to you? No   Have you been bothered in the last four weeks by sexual problems? No   Do you have difficulty concentrating, remembering or making decisions? No           Does the patient have evidence of cognitive impairment? No    Aspirin use counseling: Does not need ASA (and currently is not on it)      Recent Lab Results:  CMP:  Lab Results   Component Value Date    BUN 21 09/28/2021    CREATININE 1.19 (H) 09/28/2021    EGFRIFNONA 43 (L) 09/28/2021    BCR 17.6 09/28/2021     09/28/2021    K 4.2 09/28/2021    CO2 25.7 09/28/2021    CALCIUM 9.7 09/28/2021    ALBUMIN 4.10 09/28/2021    BILITOT 0.5 09/28/2021    ALKPHOS 49 09/28/2021    AST 14 09/28/2021    ALT 9 09/28/2021     Lipid Panel:     HbA1c:       Visual  Acuity:  No exam data present    Age-appropriate Screening Schedule:  Refer to the list below for future screening recommendations based on patient's age, sex and/or medical conditions. Orders for these recommended tests are listed in the plan section. The patient has been provided with a written plan.    Health Maintenance   Topic Date Due   • URINE MICROALBUMIN  Never done   • ZOSTER VACCINE (2 of 3) 02/26/2010   • HEMOGLOBIN A1C  Never done   • DXA SCAN  08/19/2017   • INFLUENZA VACCINE  08/01/2022   • DIABETIC EYE EXAM  01/05/2023   • MAMMOGRAM  07/16/2023   • TDAP/TD VACCINES (2 - Td or Tdap) 05/22/2027        Subjective   History of Present Illness    Tessie Mckeon is a 81 y.o. female who presents for an Subsequent Wellness Visit.    The following portions of the patient's history were reviewed and updated as appropriate: allergies, current medications, past family history, past medical history, past social history, past surgical history and problem list.    Outpatient Medications Prior to Visit   Medication Sig Dispense Refill   • sucralfate (CARAFATE) 1 g tablet TAKE 1 TABLET BY MOUTH FOUR TIMES A DAY BEFORE MEALS     • famotidine (PEPCID) 20 MG tablet Take 20 mg by mouth 2 (Two) Times a Day.     • famotidine (PEPCID) 20 MG tablet Take 1 tablet by mouth 2 (Two) Times a Day.     • hepatitis A (Havrix) 1440 EL U/ML vaccine Havrix (PF) 1,440 TYLER unit/mL intramuscular syringe       No facility-administered medications prior to visit.       Patient Active Problem List   Diagnosis   • GERD (gastroesophageal reflux disease)   • Osteoporosis   • Oral candidiasis   • Macrocytosis   • Monoclonal B-cell lymphocytosis   • Heartburn   • Diarrhea   • Malaise and fatigue   • Bloating   • Shingles       Advance Care Planning:  ACP discussion was held with the patient during this visit. Patient has an advance directive in EMR which is still valid.     Identification of Risk Factors:  Risk factors include: NA.    Review  of Systems   Constitutional: Negative for fatigue and fever.   HENT: Positive for congestion. Negative for trouble swallowing.    Eyes: Negative for discharge and visual disturbance.   Respiratory: Positive for cough. Negative for choking and shortness of breath.    Cardiovascular: Negative for chest pain and palpitations.   Gastrointestinal: Positive for abdominal pain. Negative for blood in stool.   Endocrine: Negative.    Genitourinary: Negative for genital sores and hematuria.   Musculoskeletal: Negative for gait problem and joint swelling.   Skin: Negative for color change, pallor, rash and wound.   Allergic/Immunologic: Positive for environmental allergies. Negative for immunocompromised state.   Neurological: Negative for facial asymmetry and speech difficulty.   Psychiatric/Behavioral: Negative for hallucinations and suicidal ideas.       Compared to one year ago, the patient feels her physical health is the same.  Compared to one year ago, the patient feels her mental health is the same.    Objective     Physical Exam  Vitals and nursing note reviewed.   Constitutional:       Appearance: Normal appearance. She is well-developed.   HENT:      Head: Normocephalic and atraumatic.      Nose: Nose normal.      Mouth/Throat:      Mouth: Mucous membranes are moist.      Pharynx: Oropharynx is clear.   Eyes:      Extraocular Movements: Extraocular movements intact.      Conjunctiva/sclera: Conjunctivae normal.      Pupils: Pupils are equal, round, and reactive to light.   Cardiovascular:      Rate and Rhythm: Normal rate and regular rhythm.      Heart sounds: Normal heart sounds. No murmur heard.    No friction rub. No gallop.   Pulmonary:      Effort: Pulmonary effort is normal. No respiratory distress.      Breath sounds: Normal breath sounds. No stridor. No wheezing, rhonchi or rales.   Chest:      Chest wall: No tenderness.   Abdominal:      General: Bowel sounds are normal. There is no distension.       "Palpations: Abdomen is soft. There is no mass.      Tenderness: There is no abdominal tenderness. There is no right CVA tenderness, left CVA tenderness, guarding or rebound.      Hernia: No hernia is present.   Musculoskeletal:         General: Normal range of motion.      Cervical back: Normal range of motion and neck supple.   Skin:     General: Skin is warm and dry.   Neurological:      General: No focal deficit present.      Mental Status: She is alert and oriented to person, place, and time. Mental status is at baseline.   Psychiatric:         Mood and Affect: Mood normal.         Behavior: Behavior normal.         Thought Content: Thought content normal.         Judgment: Judgment normal.         Vitals:    05/19/22 0951   BP: 110/70   BP Location: Left arm   Patient Position: Sitting   Cuff Size: Adult   Pulse: 76   Temp: 97.1 °F (36.2 °C)   TempSrc: Infrared   SpO2: 98%   Weight: 55.8 kg (123 lb)   Height: 149.9 cm (59.02\")   PainSc: 0-No pain       BMI is within normal parameters. No other follow-up for BMI required.      Assessment & Plan #1 change famotidine to 40 mg daily, sucralfate 1 g slurry 4 times daily as needed heartburn #2 lab #3 Tessalon Perles 2 tablets twice daily as needed cough, antihistamines as needed  Patient Self-Management and Personalized Health Advice  The patient has been provided with information about: NA and preventive services including:   · NA.    Visit Diagnoses:    ICD-10-CM ICD-9-CM   1. Medicare annual wellness visit, subsequent  Z00.00 V70.0   2. Gastroesophageal reflux disease with esophagitis without hemorrhage  K21.00 530.81     530.10   3. Vitamin D deficiency  E55.9 268.9   4. Lipid screening  Z13.220 V77.91   5. Abnormal finding of blood chemistry, unspecified   R79.9 790.6   6. Cough  R05.9 786.2       Orders Placed This Encounter   Procedures   • COVID-19 Vaccine (Pfizer) Gray Cap   • CBC (No Diff)     Standing Status:   Future     Standing Expiration Date:   " 5/19/2023     Order Specific Question:   Release to patient     Answer:   Immediate   • Comprehensive Metabolic Panel     Order Specific Question:   Release to patient     Answer:   Immediate   • Lipid Panel   • Vitamin D 25 Hydroxy     Order Specific Question:   Release to patient     Answer:   Immediate       Outpatient Encounter Medications as of 5/19/2022   Medication Sig Dispense Refill   • sucralfate (CARAFATE) 1 g tablet TAKE 1 TABLET BY MOUTH FOUR TIMES A DAY BEFORE MEALS     • [DISCONTINUED] famotidine (PEPCID) 20 MG tablet Take 20 mg by mouth 2 (Two) Times a Day.     • benzonatate (Tessalon Perles) 100 MG capsule 2 po bid prn cough 30 capsule 3   • famotidine (Pepcid) 40 MG tablet Take 1 tablet by mouth Every Night. 90 tablet 1   • [DISCONTINUED] famotidine (PEPCID) 20 MG tablet Take 1 tablet by mouth 2 (Two) Times a Day.     • [DISCONTINUED] hepatitis A (Havrix) 1440 EL U/ML vaccine Havrix (PF) 1,440 TYLER unit/mL intramuscular syringe       No facility-administered encounter medications on file as of 5/19/2022.       Reviewed use of high risk medication in the elderly: not applicable  Reviewed for potential of harmful drug interactions in the elderly: not applicable    Follow Up:  Return in about 6 months (around 11/19/2022), or if symptoms worsen or fail to improve, for Recheck.     An After Visit Summary and PPPS with all of these plans were given to the patient.

## 2022-05-20 ENCOUNTER — LAB (OUTPATIENT)
Dept: FAMILY MEDICINE CLINIC | Facility: CLINIC | Age: 82
End: 2022-05-20

## 2022-05-20 DIAGNOSIS — K21.00 GASTROESOPHAGEAL REFLUX DISEASE WITH ESOPHAGITIS WITHOUT HEMORRHAGE: ICD-10-CM

## 2022-05-20 DIAGNOSIS — E55.9 VITAMIN D DEFICIENCY: ICD-10-CM

## 2022-05-20 DIAGNOSIS — Z13.220 LIPID SCREENING: ICD-10-CM

## 2022-05-20 LAB
25(OH)D3 SERPL-MCNC: 25.9 NG/ML (ref 30–100)
ALBUMIN SERPL-MCNC: 3.9 G/DL (ref 3.5–5.2)
ALBUMIN/GLOB SERPL: 1.8 G/DL
ALP SERPL-CCNC: 48 U/L (ref 39–117)
ALT SERPL W P-5'-P-CCNC: 13 U/L (ref 1–33)
ANION GAP SERPL CALCULATED.3IONS-SCNC: 9.1 MMOL/L (ref 5–15)
AST SERPL-CCNC: 14 U/L (ref 1–32)
BILIRUB SERPL-MCNC: 0.7 MG/DL (ref 0–1.2)
BUN SERPL-MCNC: 17 MG/DL (ref 8–23)
BUN/CREAT SERPL: 15.7 (ref 7–25)
CALCIUM SPEC-SCNC: 9.6 MG/DL (ref 8.6–10.5)
CHLORIDE SERPL-SCNC: 106 MMOL/L (ref 98–107)
CHOLEST SERPL-MCNC: 177 MG/DL (ref 0–200)
CO2 SERPL-SCNC: 24.9 MMOL/L (ref 22–29)
CREAT SERPL-MCNC: 1.08 MG/DL (ref 0.57–1)
DEPRECATED RDW RBC AUTO: 46.6 FL (ref 37–54)
EGFRCR SERPLBLD CKD-EPI 2021: 51.1 ML/MIN/1.73
ERYTHROCYTE [DISTWIDTH] IN BLOOD BY AUTOMATED COUNT: 12.3 % (ref 12.3–15.4)
GLOBULIN UR ELPH-MCNC: 2.2 GM/DL
GLUCOSE SERPL-MCNC: 85 MG/DL (ref 65–99)
HCT VFR BLD AUTO: 43.2 % (ref 34–46.6)
HDLC SERPL-MCNC: 53 MG/DL (ref 40–60)
HGB BLD-MCNC: 14.6 G/DL (ref 12–15.9)
LDLC SERPL CALC-MCNC: 106 MG/DL (ref 0–100)
LDLC/HDLC SERPL: 1.97 {RATIO}
MCH RBC QN AUTO: 34.4 PG (ref 26.6–33)
MCHC RBC AUTO-ENTMCNC: 33.8 G/DL (ref 31.5–35.7)
MCV RBC AUTO: 101.9 FL (ref 79–97)
PLATELET # BLD AUTO: 248 10*3/MM3 (ref 140–450)
PMV BLD AUTO: 10.3 FL (ref 6–12)
POTASSIUM SERPL-SCNC: 4.7 MMOL/L (ref 3.5–5.2)
PROT SERPL-MCNC: 6.1 G/DL (ref 6–8.5)
RBC # BLD AUTO: 4.24 10*6/MM3 (ref 3.77–5.28)
SODIUM SERPL-SCNC: 140 MMOL/L (ref 136–145)
TRIGL SERPL-MCNC: 99 MG/DL (ref 0–150)
VLDLC SERPL-MCNC: 18 MG/DL (ref 5–40)
WBC NRBC COR # BLD: 8.58 10*3/MM3 (ref 3.4–10.8)

## 2022-05-20 PROCEDURE — 80061 LIPID PANEL: CPT | Performed by: INTERNAL MEDICINE

## 2022-05-20 PROCEDURE — 85027 COMPLETE CBC AUTOMATED: CPT | Performed by: INTERNAL MEDICINE

## 2022-05-20 PROCEDURE — 82306 VITAMIN D 25 HYDROXY: CPT | Performed by: INTERNAL MEDICINE

## 2022-05-20 PROCEDURE — 80053 COMPREHEN METABOLIC PANEL: CPT | Performed by: INTERNAL MEDICINE

## 2022-05-20 PROCEDURE — 36415 COLL VENOUS BLD VENIPUNCTURE: CPT | Performed by: INTERNAL MEDICINE

## 2022-06-07 ENCOUNTER — TRANSCRIBE ORDERS (OUTPATIENT)
Dept: ADMINISTRATIVE | Facility: HOSPITAL | Age: 82
End: 2022-06-07

## 2022-06-07 DIAGNOSIS — Z12.31 VISIT FOR SCREENING MAMMOGRAM: Primary | ICD-10-CM

## 2022-07-20 ENCOUNTER — HOSPITAL ENCOUNTER (OUTPATIENT)
Dept: MAMMOGRAPHY | Facility: HOSPITAL | Age: 82
Discharge: HOME | End: 2022-07-20
Admitting: INTERNAL MEDICINE

## 2022-07-20 DIAGNOSIS — Z12.31 VISIT FOR SCREENING MAMMOGRAM: ICD-10-CM

## 2022-07-20 PROCEDURE — 77063 BREAST TOMOSYNTHESIS BI: CPT

## 2022-07-20 PROCEDURE — 77067 SCR MAMMO BI INCL CAD: CPT

## 2022-09-14 ENCOUNTER — TELEPHONE (OUTPATIENT)
Dept: ONCOLOGY | Facility: CLINIC | Age: 82
End: 2022-09-14

## 2022-09-14 DIAGNOSIS — D75.89 MACROCYTOSIS: ICD-10-CM

## 2022-09-14 DIAGNOSIS — D72.820 MONOCLONAL B-CELL LYMPHOCYTOSIS: Primary | ICD-10-CM

## 2022-09-14 NOTE — TELEPHONE ENCOUNTER
Caller: Tessie Mckeon    Relationship: Self    Best call back number: [phone]    What is the best time to reach you:     Who are you requesting to speak with (clinical staff, provider,  specific staff member): NON CLINICAL    Do you know the name of the person who called: HELGA    What was the call regarding: PATIENT CALLING TO ASK IF THERE IS AN APPT AVAILABLE FOR LAB AND FU ON THE SAME DAY    IF NOT PATIENT WILL KEEP AS IS    Do you require a callback: YES

## 2022-09-15 NOTE — TELEPHONE ENCOUNTER
Explained to patient the reason for moving her appt a week early (due to chemistry machine will be down on 9/27), patient v/u and amenable to come on 9/20 at 1:30pm for lab and MD visit at 2pm. Will coordinate with .

## 2022-09-16 ENCOUNTER — TELEPHONE (OUTPATIENT)
Dept: ONCOLOGY | Facility: CLINIC | Age: 82
End: 2022-09-16

## 2022-09-16 NOTE — TELEPHONE ENCOUNTER
Caller: Tessie Mckeon    Relationship: Self    Best call back number: [phone]    What is the best time to reach you: ANYTIME     Who are you requesting to speak with (clinical staff, provider,  specific staff member): SCHEDULING         What was the call regarding: LAB SCHEDULED AT [facility] 09/20 BELIEVE IN ERROR AND   SHOULD BE SCHEDULED  AT [place]  SINCE IS SEEING THE DR DELVALLE AT [place] SAME DAY 09/20        Do you require a callback: YES FOLLOW UP

## 2022-09-20 ENCOUNTER — LAB (OUTPATIENT)
Dept: OTHER | Facility: HOSPITAL | Age: 82
End: 2022-09-20

## 2022-09-20 ENCOUNTER — APPOINTMENT (OUTPATIENT)
Dept: LAB | Facility: HOSPITAL | Age: 82
End: 2022-09-20

## 2022-09-20 ENCOUNTER — OFFICE VISIT (OUTPATIENT)
Dept: ONCOLOGY | Facility: CLINIC | Age: 82
End: 2022-09-20

## 2022-09-20 VITALS
HEART RATE: 73 BPM | WEIGHT: 123.9 LBS | OXYGEN SATURATION: 96 % | SYSTOLIC BLOOD PRESSURE: 165 MMHG | TEMPERATURE: 98.4 F | DIASTOLIC BLOOD PRESSURE: 80 MMHG | RESPIRATION RATE: 18 BRPM | HEIGHT: 59 IN | BODY MASS INDEX: 24.98 KG/M2

## 2022-09-20 DIAGNOSIS — D72.820 MONOCLONAL B-CELL LYMPHOCYTOSIS: Primary | ICD-10-CM

## 2022-09-20 DIAGNOSIS — D72.820 MONOCLONAL B-CELL LYMPHOCYTOSIS: ICD-10-CM

## 2022-09-20 DIAGNOSIS — D75.89 MACROCYTOSIS: ICD-10-CM

## 2022-09-20 LAB
ALBUMIN SERPL-MCNC: 4 G/DL (ref 3.5–5.2)
ALBUMIN/GLOB SERPL: 1.5 G/DL
ALP SERPL-CCNC: 51 U/L (ref 39–117)
ALT SERPL W P-5'-P-CCNC: 8 U/L (ref 1–33)
ANION GAP SERPL CALCULATED.3IONS-SCNC: 7.4 MMOL/L (ref 5–15)
AST SERPL-CCNC: 13 U/L (ref 1–32)
BASOPHILS # BLD AUTO: 0.04 10*3/MM3 (ref 0–0.2)
BASOPHILS NFR BLD AUTO: 0.4 % (ref 0–1.5)
BILIRUB SERPL-MCNC: 0.5 MG/DL (ref 0–1.2)
BUN SERPL-MCNC: 19 MG/DL (ref 8–23)
BUN/CREAT SERPL: 16.5 (ref 7–25)
CALCIUM SPEC-SCNC: 10 MG/DL (ref 8.6–10.5)
CHLORIDE SERPL-SCNC: 109 MMOL/L (ref 98–107)
CO2 SERPL-SCNC: 27.6 MMOL/L (ref 22–29)
CREAT SERPL-MCNC: 1.15 MG/DL (ref 0.57–1)
DEPRECATED RDW RBC AUTO: 46.5 FL (ref 37–54)
EGFRCR SERPLBLD CKD-EPI 2021: 47.1 ML/MIN/1.73
EOSINOPHIL # BLD AUTO: 0.08 10*3/MM3 (ref 0–0.4)
EOSINOPHIL NFR BLD AUTO: 0.9 % (ref 0.3–6.2)
ERYTHROCYTE [DISTWIDTH] IN BLOOD BY AUTOMATED COUNT: 12.3 % (ref 12.3–15.4)
GLOBULIN UR ELPH-MCNC: 2.7 GM/DL
GLUCOSE SERPL-MCNC: 101 MG/DL (ref 65–99)
HCT VFR BLD AUTO: 44.1 % (ref 34–46.6)
HGB BLD-MCNC: 15 G/DL (ref 12–15.9)
IMM GRANULOCYTES # BLD AUTO: 0.03 10*3/MM3 (ref 0–0.05)
IMM GRANULOCYTES NFR BLD AUTO: 0.3 % (ref 0–0.5)
LYMPHOCYTES # BLD AUTO: 5.31 10*3/MM3 (ref 0.7–3.1)
LYMPHOCYTES NFR BLD AUTO: 56.9 % (ref 19.6–45.3)
MCH RBC QN AUTO: 34.5 PG (ref 26.6–33)
MCHC RBC AUTO-ENTMCNC: 34 G/DL (ref 31.5–35.7)
MCV RBC AUTO: 101.4 FL (ref 79–97)
MONOCYTES # BLD AUTO: 0.41 10*3/MM3 (ref 0.1–0.9)
MONOCYTES NFR BLD AUTO: 4.4 % (ref 5–12)
NEUTROPHILS NFR BLD AUTO: 3.47 10*3/MM3 (ref 1.7–7)
NEUTROPHILS NFR BLD AUTO: 37.1 % (ref 42.7–76)
NRBC BLD AUTO-RTO: 0 /100 WBC (ref 0–0.2)
PLAT MORPH BLD: NORMAL
PLATELET # BLD AUTO: 190 10*3/MM3 (ref 140–450)
PMV BLD AUTO: 10 FL (ref 6–12)
POTASSIUM SERPL-SCNC: 4.6 MMOL/L (ref 3.5–5.2)
PROT SERPL-MCNC: 6.7 G/DL (ref 6–8.5)
RBC # BLD AUTO: 4.35 10*6/MM3 (ref 3.77–5.28)
RBC MORPH BLD: NORMAL
SODIUM SERPL-SCNC: 144 MMOL/L (ref 136–145)
WBC MORPH BLD: NORMAL
WBC NRBC COR # BLD: 9.34 10*3/MM3 (ref 3.4–10.8)

## 2022-09-20 PROCEDURE — 36415 COLL VENOUS BLD VENIPUNCTURE: CPT

## 2022-09-20 PROCEDURE — 99213 OFFICE O/P EST LOW 20 MIN: CPT | Performed by: INTERNAL MEDICINE

## 2022-09-20 PROCEDURE — 85025 COMPLETE CBC W/AUTO DIFF WBC: CPT | Performed by: INTERNAL MEDICINE

## 2022-09-20 PROCEDURE — 80053 COMPREHEN METABOLIC PANEL: CPT | Performed by: INTERNAL MEDICINE

## 2022-09-20 PROCEDURE — 85007 BL SMEAR W/DIFF WBC COUNT: CPT | Performed by: INTERNAL MEDICINE

## 2022-09-20 NOTE — PROGRESS NOTES
Subjective     REASON FOR FOLLOW UP:  B-cell monoclonal population in PB by flow  Macrocytosis                             HISTORY OF PRESENT ILLNESS:  The patient is a 82 y.o. 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  She is not on any treatment and seems to be doing well.     Her blood counts today look good with a normal white count with mild peripheral lymphocytosis.  Hemoglobin is normal at 1 15 and platelets are normal at 190,000.  Her MCV remains elevated at 101.4.     She has not noted any lymphadenopathy.       History of Present Illness     Past Medical History:   Diagnosis Date   • Arthritis    • Bacterial vaginitis    • Bronchitis    • Colon polyps    • Dizziness    • Elevated lipids    • Fatigue    • GERD (gastroesophageal reflux disease)    • Headache    • Hematuria    • History of EKG     date?; borderline   • Hypovitaminosis D    • Lump, breast     Unable to remember which breast   • Macrocytosis    • Oral candidiasis    • Osteoporosis    • Postmenopausal bleeding    • Suprapubic tenderness    • Vaginal atrophy    • Yeast vaginitis         Past Surgical History:   Procedure Laterality Date   • BREAST EXCISIONAL BIOPSY Left     12-15 years ago in Dr. Hendricks's office   • BREAST SURGERY Left 2003    [facility]   • CATARACT EXTRACTION  2009   • COLONOSCOPY  10/23/2017    Dr Broderick   • DILATATION AND CURETTAGE  09/21/2015   • EYE SURGERY     • FOOT SURGERY Right 1999   • GUM SURGERY  1997   • UPPER GASTROINTESTINAL ENDOSCOPY  02/01/2021    EULALIA HERNANDEZ MD        ALLERGIES:    Allergies   Allergen Reactions   • Clindamycin/Lincomycin Other (See Comments)     Joint pain     • Proton Pump Inhibitors Other (See Comments)     Joint pain          Review of Systems   Constitutional: Negative for activity change, appetite change, fatigue, fever and unexpected weight change.   HENT: Negative for hearing loss,  "nosebleeds, trouble swallowing and voice change.    Eyes: Negative for visual disturbance.   Respiratory: Negative for cough, shortness of breath and wheezing.    Cardiovascular: Negative for chest pain and palpitations.   Gastrointestinal: Negative for abdominal pain, diarrhea, nausea and vomiting.   Genitourinary: Negative for difficulty urinating, frequency, hematuria and urgency.   Musculoskeletal: Positive for back pain. Negative for neck pain.   Skin: Negative for rash.   Neurological: Negative for dizziness, seizures, syncope and headaches.   Hematological: Negative for adenopathy. Does not bruise/bleed easily.   Psychiatric/Behavioral: Negative for behavioral problems. The patient is not nervous/anxious.         Objective     Vitals:    09/20/22 1340   BP: 165/80   Pulse: 73   Resp: 18   Temp: 98.4 °F (36.9 °C)   TempSrc: Temporal   SpO2: 96%   Weight: 56.2 kg (123 lb 14.4 oz)   Height: 149.9 cm (59.02\")   PainSc: 0-No pain     Current Status 9/20/2022   ECOG score 0       Physical Exam   Constitutional: She is oriented to person, place, and time. She appears well-developed. No distress.   HENT:   Head: Normocephalic.   Eyes: Pupils are equal, round, and reactive to light. Conjunctivae are normal. No scleral icterus.   Neck: No JVD present. No thyromegaly present.   Cardiovascular: Normal rate and regular rhythm. Exam reveals no gallop and no friction rub.   No murmur heard.  Pulmonary/Chest: Effort normal and breath sounds normal. She has no wheezes. She has no rales.   Abdominal: Soft. She exhibits no distension and no mass. There is no abdominal tenderness.   Musculoskeletal: Normal range of motion. No deformity.   Lymphadenopathy:     She has no cervical adenopathy.   Neurological: She is alert and oriented to person, place, and time. She has normal reflexes. No cranial nerve deficit.   Skin: Skin is warm and dry. No rash noted. No erythema.   Psychiatric: Her behavior is normal. Judgment normal.       I " have reexamined the patient and the results are consistent with the previously documented exam. Darrel Roque MD     RECENT LABS:  Hematology WBC   Date Value Ref Range Status   09/20/2022 9.34 3.40 - 10.80 10*3/mm3 Final     RBC   Date Value Ref Range Status   09/20/2022 4.35 3.77 - 5.28 10*6/mm3 Final     Hemoglobin   Date Value Ref Range Status   09/20/2022 15.0 12.0 - 15.9 g/dL Final     Hematocrit   Date Value Ref Range Status   09/20/2022 44.1 34.0 - 46.6 % Final     Platelets   Date Value Ref Range Status   09/20/2022 190 140 - 450 10*3/mm3 Final      FLOW CYTOMETRY 8/16/16:  Small monoclonal B-cell population ( 16% ).           Assessment & Plan   1.  Mild chronic macrocytosis.  Stable  2.  Peripheral blood flow cytometry showing a very small (16%) monoclonal B-cell population.  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  The patient does not have any palpable lymphadenopathy and is having no new symptoms.  Her blood counts remain normal with the exception of mild peripheral lymphocytosis(which remains stable).    Plan  1.  We reviewed the results of the labs from today with the patient and provided her printed copy of the CBC report.  2.  Return in 12 months for routine follow-up including repeat CBC and serum chemistries.   3.  We did encourage her to get the latest Covid booster and a flu shot.

## 2022-09-22 ENCOUNTER — APPOINTMENT (OUTPATIENT)
Dept: OTHER | Facility: HOSPITAL | Age: 82
End: 2022-09-22

## 2022-09-27 ENCOUNTER — APPOINTMENT (OUTPATIENT)
Dept: OTHER | Facility: HOSPITAL | Age: 82
End: 2022-09-27

## 2022-11-29 RX ORDER — FAMOTIDINE 40 MG/1
40 TABLET, FILM COATED ORAL NIGHTLY
Qty: 90 TABLET | Refills: 0 | Status: SHIPPED | OUTPATIENT
Start: 2022-11-29

## 2022-12-16 ENCOUNTER — OFFICE VISIT (OUTPATIENT)
Dept: FAMILY MEDICINE CLINIC | Facility: CLINIC | Age: 82
End: 2022-12-16

## 2022-12-16 VITALS
WEIGHT: 125.8 LBS | DIASTOLIC BLOOD PRESSURE: 90 MMHG | BODY MASS INDEX: 25.36 KG/M2 | SYSTOLIC BLOOD PRESSURE: 162 MMHG | HEIGHT: 59 IN | TEMPERATURE: 98.2 F | OXYGEN SATURATION: 97 % | HEART RATE: 87 BPM

## 2022-12-16 DIAGNOSIS — E55.9 VITAMIN D DEFICIENCY: ICD-10-CM

## 2022-12-16 DIAGNOSIS — K21.00 GASTROESOPHAGEAL REFLUX DISEASE WITH ESOPHAGITIS WITHOUT HEMORRHAGE: ICD-10-CM

## 2022-12-16 DIAGNOSIS — Z23 ENCOUNTER FOR ADMINISTRATION OF VACCINE: ICD-10-CM

## 2022-12-16 DIAGNOSIS — I10 PRIMARY HYPERTENSION: Primary | ICD-10-CM

## 2022-12-16 PROBLEM — R12 HEARTBURN: Status: RESOLVED | Noted: 2017-07-13 | Resolved: 2022-12-16

## 2022-12-16 PROCEDURE — 90677 PCV20 VACCINE IM: CPT | Performed by: INTERNAL MEDICINE

## 2022-12-16 PROCEDURE — 99214 OFFICE O/P EST MOD 30 MIN: CPT | Performed by: INTERNAL MEDICINE

## 2022-12-16 PROCEDURE — G0009 ADMIN PNEUMOCOCCAL VACCINE: HCPCS | Performed by: INTERNAL MEDICINE

## 2022-12-16 RX ORDER — VALSARTAN 160 MG/1
160 TABLET ORAL DAILY
Qty: 90 TABLET | Refills: 1 | Status: SHIPPED | OUTPATIENT
Start: 2022-12-16 | End: 2023-02-02

## 2022-12-16 NOTE — PROGRESS NOTES
"Chief Complaint  6 month follow up (Not fasting. Due pneumonia shot. )    Subjective        Tessie Mckeon presents to [facility]  History of Present Illness patient was seen for hypertension.  Pressure at home running in the 140s over 80s.  Patient was 160 systolic in the office today.  Patient was placed on valsartan under 60 mg daily.  Patient will monitor blood pressure follow-up in 2 months.  Patient's GERD has been treated with sucralfate 1 g 4 times daily before meals and famotidine 40 mg nightly.  Patient's symptoms controlled with this treatment and she will continue it.    Dictated utilizing Dragon dictation. If there are questions or for further clarification, please contact me.    Objective   Vital Signs:  Blood Pressure 162/90 (BP Location: Left arm, Patient Position: Sitting, Cuff Size: Adult)   Pulse 87   Temperature 98.2 °F (36.8 °C)   Height 149.9 cm (59.02\")   Weight 57.1 kg (125 lb 12.8 oz)   Oxygen Saturation 97%   Body Mass Index 25.39 kg/m²   Estimated body mass index is 25.39 kg/m² as calculated from the following:    Height as of this encounter: 149.9 cm (59.02\").    Weight as of this encounter: 57.1 kg (125 lb 12.8 oz).          Physical Exam  Vitals and nursing note reviewed.   Constitutional:       Appearance: Normal appearance. She is well-developed.   HENT:      Head: Normocephalic and atraumatic.      Nose: Nose normal.      Mouth/Throat:      Mouth: Mucous membranes are moist.      Pharynx: Oropharynx is clear.   Eyes:      Extraocular Movements: Extraocular movements intact.      Conjunctiva/sclera: Conjunctivae normal.      Pupils: Pupils are equal, round, and reactive to light.   Cardiovascular:      Rate and Rhythm: Normal rate and regular rhythm.      Heart sounds: Normal heart sounds. No murmur heard.    No friction rub. No gallop.   Pulmonary:      Effort: Pulmonary effort is normal. No respiratory distress.      Breath sounds: Normal " breath sounds. No stridor. No wheezing, rhonchi or rales.   Chest:      Chest wall: No tenderness.   Abdominal:      General: Bowel sounds are normal.      Palpations: Abdomen is soft.   Musculoskeletal:         General: Normal range of motion.      Cervical back: Normal range of motion and neck supple.   Skin:     General: Skin is warm and dry.   Neurological:      General: No focal deficit present.      Mental Status: She is alert and oriented to person, place, and time. Mental status is at baseline.   Psychiatric:         Mood and Affect: Mood normal.         Behavior: Behavior normal.         Thought Content: Thought content normal.         Judgment: Judgment normal.        Result Review :                Assessment and Plan  #1 valsartan 160 mg daily #2 Pneumovax 20  Diagnoses and all orders for this visit:    1. Primary hypertension (Primary)  -     Comprehensive Metabolic Panel; Future  -     CBC (No Diff); Future  -     Lipid Panel; Future  -     Vitamin D,25-Hydroxy; Future    2. Gastroesophageal reflux disease with esophagitis without hemorrhage  -     Comprehensive Metabolic Panel; Future  -     CBC (No Diff); Future  -     Lipid Panel; Future  -     Vitamin D,25-Hydroxy; Future    3. Vitamin D deficiency  -     Comprehensive Metabolic Panel; Future  -     CBC (No Diff); Future  -     Lipid Panel; Future  -     Vitamin D,25-Hydroxy; Future    Other orders  -     valsartan (Diovan) 160 MG tablet; Take 1 tablet by mouth Daily.  Dispense: 90 tablet; Refill: 1  -     Pneumococcal Conjugate Vaccine 20-Valent All             Follow Up   Return in about 2 months (around 2/16/2023), or if symptoms worsen or fail to improve, for Recheck.  Patient was given instructions and counseling regarding her condition or for health maintenance advice. Please see specific information pulled into the AVS if appropriate.

## 2022-12-21 ENCOUNTER — OFFICE VISIT (OUTPATIENT)
Dept: FAMILY MEDICINE CLINIC | Facility: CLINIC | Age: 82
End: 2022-12-21

## 2022-12-21 ENCOUNTER — TELEPHONE (OUTPATIENT)
Dept: FAMILY MEDICINE CLINIC | Facility: CLINIC | Age: 82
End: 2022-12-21

## 2022-12-21 VITALS
SYSTOLIC BLOOD PRESSURE: 172 MMHG | BODY MASS INDEX: 25.08 KG/M2 | HEIGHT: 59 IN | DIASTOLIC BLOOD PRESSURE: 84 MMHG | TEMPERATURE: 98.6 F | WEIGHT: 124.4 LBS | HEART RATE: 91 BPM | OXYGEN SATURATION: 97 % | RESPIRATION RATE: 18 BRPM

## 2022-12-21 DIAGNOSIS — I10 PRIMARY HYPERTENSION: Primary | ICD-10-CM

## 2022-12-21 DIAGNOSIS — T78.40XA ALLERGIC REACTION, INITIAL ENCOUNTER: ICD-10-CM

## 2022-12-21 PROCEDURE — 99213 OFFICE O/P EST LOW 20 MIN: CPT | Performed by: INTERNAL MEDICINE

## 2022-12-21 NOTE — PROGRESS NOTES
"Chief Complaint  Arm Swelling (Arm is swollen from pneumonia shot)    Subjective        Tessie Mckeon presents to [facility]  History of Present Illness patient was seen for hypertension.  Patient blood pressures been running in the 170s over 80s.  She has been taking valsartan 160 mg daily.  Patient was placed on verapamil 180 mg daily in addition to the valsartan.  Patient will monitor blood pressure and call results in 1 week.  Patient also got a Pneumovax 20 vaccine on Friday and left arm is severely indurated with erythema.  Patient was advised to use ice packs with diclofenac topical gel and Benadryl 25 mg p.o. every 8 as needed itching and allergies.  Patient was informed that Benadryl was very sedating and she cannot drive.  Patient was strongly advised not to get another Pneumovax 20.  Patient is following up in 1 week.    Dictated utilizing Dragon dictation. If there are questions or for further clarification, please contact me.    Objective   Vital Signs:  Blood Pressure 172/84 (BP Location: Left arm, Patient Position: Sitting)   Pulse 91   Temperature 98.6 °F (37 °C) (Infrared)   Respiration 18   Height 149.9 cm (59.02\")   Weight 56.4 kg (124 lb 6.4 oz)   Oxygen Saturation 97%   Body Mass Index 25.11 kg/m²   Estimated body mass index is 25.11 kg/m² as calculated from the following:    Height as of this encounter: 149.9 cm (59.02\").    Weight as of this encounter: 56.4 kg (124 lb 6.4 oz).          Physical Exam  Vitals and nursing note reviewed.   Constitutional:       Appearance: Normal appearance. She is well-developed.   HENT:      Head: Normocephalic and atraumatic.      Nose: Nose normal.      Mouth/Throat:      Mouth: Mucous membranes are moist.      Pharynx: Oropharynx is clear.   Eyes:      Extraocular Movements: Extraocular movements intact.      Conjunctiva/sclera: Conjunctivae normal.      Pupils: Pupils are equal, round, and reactive to light. "   Cardiovascular:      Rate and Rhythm: Normal rate and regular rhythm.      Heart sounds: Normal heart sounds. No murmur heard.    No friction rub. No gallop.   Pulmonary:      Effort: Pulmonary effort is normal. No respiratory distress.      Breath sounds: Normal breath sounds. No stridor. No wheezing, rhonchi or rales.   Chest:      Chest wall: No tenderness.   Abdominal:      General: Bowel sounds are normal.      Palpations: Abdomen is soft.   Musculoskeletal:         General: Normal range of motion.      Cervical back: Normal range of motion and neck supple.   Skin:     General: Skin is warm and dry.      Findings: Rash present.   Neurological:      General: No focal deficit present.      Mental Status: She is alert and oriented to person, place, and time. Mental status is at baseline.   Psychiatric:         Mood and Affect: Mood normal.         Behavior: Behavior normal.         Thought Content: Thought content normal.         Judgment: Judgment normal.        Result Review :                Assessment and Plan  #1 verapamil 180 mg daily with valsartan under 60 mg daily #2 diclofenac gel to arm with Benadryl 25 mg p.o. every 8 as needed itching and allergic reaction.  Patient was strongly advised not to drive after taking Benadryl No. 3 follow-up in 1 week  Diagnoses and all orders for this visit:    1. Primary hypertension (Primary)    2. Allergic reaction, initial encounter    Other orders  -     verapamil SR (CALAN-SR) 180 MG CR tablet; Take 1 tablet by mouth Every Night.  Dispense: 30 tablet; Refill: 3  -     mupirocin (BACTROBAN) 2 % ointment; Apply 1 application topically to the appropriate area as directed 2 (Two) Times a Day.  Dispense: 22 g; Refill: 3             Follow Up   Return in about 1 week (around 12/28/2022).  Patient was given instructions and counseling regarding her condition or for health maintenance advice. Please see specific information pulled into the AVS if appropriate.

## 2022-12-21 NOTE — TELEPHONE ENCOUNTER
Caller: Tessie Mckeon    Relationship: Self    Best call back number:  [phone] (Mobile)      What was the call regarding:  EVER SINCE SHE HAD THE PNEUMONIA VACCINATION LAST WEEK, HER ARM IS RED & SWOLLEN AND MOVING DOWN ARM     SHE'S ONLY HAD THIS HAPPEN ONCE BEFORE     Do you require a callback: YES PLEASE

## 2022-12-21 NOTE — TELEPHONE ENCOUNTER
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  Must be seen today.

## 2023-01-04 ENCOUNTER — TELEPHONE (OUTPATIENT)
Dept: FAMILY MEDICINE CLINIC | Facility: CLINIC | Age: 83
End: 2023-01-04

## 2023-02-02 ENCOUNTER — OFFICE VISIT (OUTPATIENT)
Dept: FAMILY MEDICINE CLINIC | Facility: CLINIC | Age: 83
End: 2023-02-02
Payer: MEDICARE

## 2023-02-02 VITALS
DIASTOLIC BLOOD PRESSURE: 92 MMHG | TEMPERATURE: 100.1 F | SYSTOLIC BLOOD PRESSURE: 140 MMHG | HEIGHT: 59 IN | OXYGEN SATURATION: 95 % | BODY MASS INDEX: 24.88 KG/M2 | HEART RATE: 99 BPM | WEIGHT: 123.4 LBS

## 2023-02-02 DIAGNOSIS — R53.83 MALAISE AND FATIGUE: ICD-10-CM

## 2023-02-02 DIAGNOSIS — R10.9 ABDOMINAL PAIN, UNSPECIFIED ABDOMINAL LOCATION: ICD-10-CM

## 2023-02-02 DIAGNOSIS — R53.81 MALAISE AND FATIGUE: ICD-10-CM

## 2023-02-02 DIAGNOSIS — R50.9 FEVER, UNSPECIFIED FEVER CAUSE: Primary | ICD-10-CM

## 2023-02-02 DIAGNOSIS — I10 PRIMARY HYPERTENSION: ICD-10-CM

## 2023-02-02 LAB
EXPIRATION DATE: NORMAL
FLUAV AG UPPER RESP QL IA.RAPID: NOT DETECTED
FLUBV AG UPPER RESP QL IA.RAPID: NOT DETECTED
INTERNAL CONTROL: NORMAL
Lab: NORMAL
SARS-COV-2 AG UPPER RESP QL IA.RAPID: NORMAL

## 2023-02-02 PROCEDURE — 87428 SARSCOV & INF VIR A&B AG IA: CPT | Performed by: INTERNAL MEDICINE

## 2023-02-02 PROCEDURE — 99213 OFFICE O/P EST LOW 20 MIN: CPT | Performed by: INTERNAL MEDICINE

## 2023-02-02 RX ORDER — CIPROFLOXACIN 500 MG/1
500 TABLET, FILM COATED ORAL 2 TIMES DAILY
Qty: 28 TABLET | Refills: 0 | Status: SHIPPED | OUTPATIENT
Start: 2023-02-02 | End: 2023-03-06

## 2023-02-02 NOTE — PROGRESS NOTES
"Chief Complaint  Hypertension, Fever, and Abdominal Pain    Subjective        Tessie Mckeon presents to [facility]  History of Present Illness patient was seen for fever 100.  Patient's not had any coughing or dysuria and has slight tenderness to palpation in the epigastric area.  Patient was scheduled for a CBC with a UA C&S and BMP.  Patient told the  she was going home and come back for the labs.  Etiology of the fever is unknown and if labs come back normal we will try to get a CT scan of the abdomen pelvis.  Patient did have a COVID flu test which was negative.  Patient blood pressures been running 120s over 80s at home.  Patient has been taking valsartan 160 mg daily with verapamil  mg daily.  Patient wanted to stop one of her medications and we stopped the valsartan.  Patient will monitor blood pressure and follow-up in 6 weeks.        Objective   Vital Signs:  Blood Pressure 140/92 (BP Location: Left arm, Patient Position: Sitting, Cuff Size: Adult)   Pulse 99   Temperature 100.1 °F (37.8 °C)   Height 149.9 cm (59.02\")   Weight 56 kg (123 lb 6.4 oz)   Oxygen Saturation 95%   Body Mass Index 24.91 kg/m²   Estimated body mass index is 24.91 kg/m² as calculated from the following:    Height as of this encounter: 149.9 cm (59.02\").    Weight as of this encounter: 56 kg (123 lb 6.4 oz).       BMI is within normal parameters. No other follow-up for BMI required.      Physical Exam  Vitals and nursing note reviewed.   Constitutional:       Appearance: Normal appearance. She is well-developed.   HENT:      Head: Normocephalic and atraumatic.      Nose: Nose normal.      Mouth/Throat:      Mouth: Mucous membranes are moist.      Pharynx: Oropharynx is clear.   Eyes:      Extraocular Movements: Extraocular movements intact.      Conjunctiva/sclera: Conjunctivae normal.      Pupils: Pupils are equal, round, and reactive to light.   Cardiovascular:      Rate and " Rhythm: Normal rate and regular rhythm.      Heart sounds: Normal heart sounds. No murmur heard.    No friction rub. No gallop.   Pulmonary:      Effort: Pulmonary effort is normal. No respiratory distress.      Breath sounds: Normal breath sounds. No stridor. No wheezing, rhonchi or rales.   Chest:      Chest wall: No tenderness.   Abdominal:      General: Bowel sounds are normal. There is no distension.      Palpations: Abdomen is soft. There is no mass.      Tenderness: There is abdominal tenderness. There is no right CVA tenderness, left CVA tenderness, guarding or rebound.      Hernia: No hernia is present.   Musculoskeletal:         General: Normal range of motion.      Cervical back: Normal range of motion and neck supple.   Skin:     General: Skin is warm and dry.   Neurological:      General: No focal deficit present.      Mental Status: She is alert and oriented to person, place, and time. Mental status is at baseline.   Psychiatric:         Mood and Affect: Mood normal.         Behavior: Behavior normal.         Thought Content: Thought content normal.         Judgment: Judgment normal.        Result Review :                   Assessment and Plan  #1 CBC, UA C&S, BMP #2 flu COVID testing #3 if testing negative CT scan abdomen pelvis  Diagnoses and all orders for this visit:    1. Fever, unspecified fever cause (Primary)  -     POCT SARS-CoV-2 Antigen ARTURO + Flu  -     Urine Culture - Urine, Urine, Clean Catch  -     Urinalysis With Microscopic - Urine, Clean Catch  -     CBC & Differential  -     Basic Metabolic Panel  -     CT Abdomen Pelvis Without Contrast; Future    2. Primary hypertension    3. Malaise and fatigue  -     CT Abdomen Pelvis Without Contrast; Future    4. Abdominal pain, unspecified abdominal location  -     CT Abdomen Pelvis Without Contrast; Future             Follow Up   Return in about 6 weeks (around 3/16/2023), or if symptoms worsen or fail to improve, for Recheck.  Patient was  given instructions and counseling regarding her condition or for health maintenance advice. Please see specific information pulled into the AVS if appropriate.

## 2023-02-04 LAB
APPEARANCE UR: CLEAR
BACTERIA #/AREA URNS HPF: ABNORMAL /[HPF]
BACTERIA UR CULT: NORMAL
BACTERIA UR CULT: NORMAL
BASOPHILS # BLD AUTO: 0 X10E3/UL (ref 0–0.2)
BASOPHILS NFR BLD AUTO: 0 %
BILIRUB UR QL STRIP: NEGATIVE
BUN SERPL-MCNC: 17 MG/DL (ref 8–27)
BUN/CREAT SERPL: 15 (ref 12–28)
CALCIUM SERPL-MCNC: 9.4 MG/DL (ref 8.7–10.3)
CASTS URNS QL MICRO: ABNORMAL /LPF
CHLORIDE SERPL-SCNC: 104 MMOL/L (ref 96–106)
CO2 SERPL-SCNC: 20 MMOL/L (ref 20–29)
COLOR UR: YELLOW
CREAT SERPL-MCNC: 1.1 MG/DL (ref 0.57–1)
EGFRCR SERPLBLD CKD-EPI 2021: 50 ML/MIN/1.73
EOSINOPHIL # BLD AUTO: 0 X10E3/UL (ref 0–0.4)
EOSINOPHIL NFR BLD AUTO: 0 %
EPI CELLS #/AREA URNS HPF: ABNORMAL /HPF (ref 0–10)
ERYTHROCYTE [DISTWIDTH] IN BLOOD BY AUTOMATED COUNT: 12.4 % (ref 11.7–15.4)
GLUCOSE SERPL-MCNC: 103 MG/DL (ref 70–99)
GLUCOSE UR QL STRIP: NEGATIVE
HCT VFR BLD AUTO: 41.3 % (ref 34–46.6)
HGB BLD-MCNC: 14.2 G/DL (ref 11.1–15.9)
HGB UR QL STRIP: ABNORMAL
IMM GRANULOCYTES # BLD AUTO: 0 X10E3/UL (ref 0–0.1)
IMM GRANULOCYTES NFR BLD AUTO: 0 %
KETONES UR QL STRIP: NEGATIVE
LEUKOCYTE ESTERASE UR QL STRIP: ABNORMAL
LYMPHOCYTES # BLD AUTO: 3.1 X10E3/UL (ref 0.7–3.1)
LYMPHOCYTES NFR BLD AUTO: 30 %
MCH RBC QN AUTO: 34.4 PG (ref 26.6–33)
MCHC RBC AUTO-ENTMCNC: 34.4 G/DL (ref 31.5–35.7)
MCV RBC AUTO: 100 FL (ref 79–97)
MICRO URNS: ABNORMAL
MONOCYTES # BLD AUTO: 0.8 X10E3/UL (ref 0.1–0.9)
MONOCYTES NFR BLD AUTO: 8 %
NEUTROPHILS # BLD AUTO: 6.6 X10E3/UL (ref 1.4–7)
NEUTROPHILS NFR BLD AUTO: 62 %
NITRITE UR QL STRIP: NEGATIVE
PH UR STRIP: 6 [PH] (ref 5–7.5)
PLATELET # BLD AUTO: 187 X10E3/UL (ref 150–450)
POTASSIUM SERPL-SCNC: 4.2 MMOL/L (ref 3.5–5.2)
PROT UR QL STRIP: NEGATIVE
RBC # BLD AUTO: 4.13 X10E6/UL (ref 3.77–5.28)
RBC #/AREA URNS HPF: ABNORMAL /HPF (ref 0–2)
SODIUM SERPL-SCNC: 142 MMOL/L (ref 134–144)
SP GR UR STRIP: 1.01 (ref 1–1.03)
UROBILINOGEN UR STRIP-MCNC: 0.2 MG/DL (ref 0.2–1)
WBC # BLD AUTO: 10.6 X10E3/UL (ref 3.4–10.8)
WBC #/AREA URNS HPF: ABNORMAL /HPF (ref 0–5)

## 2023-02-08 ENCOUNTER — TELEPHONE (OUTPATIENT)
Dept: FAMILY MEDICINE CLINIC | Facility: CLINIC | Age: 83
End: 2023-02-08

## 2023-02-08 NOTE — TELEPHONE ENCOUNTER
Caller: Tessie Mckeon    Relationship: Self    Best call back number: [phone]    What is the best time to reach you: ANYTIME    Who are you requesting to speak with (clinical staff, provider,  specific staff member):CLINICAL    What was the call regarding: REGARDING TEST RESULTS DONE ON 2/2    Do you require a callback:YES

## 2023-02-08 NOTE — TELEPHONE ENCOUNTER
UA C&S did not show a bladder infection, but there was a second urinalysis that showed WBCs, need to know if she is on antibiotics.

## 2023-02-23 ENCOUNTER — HOSPITAL ENCOUNTER (OUTPATIENT)
Dept: CT IMAGING | Facility: HOSPITAL | Age: 83
Discharge: HOME | End: 2023-02-23
Admitting: INTERNAL MEDICINE
Payer: MEDICARE

## 2023-02-23 DIAGNOSIS — R10.9 ABDOMINAL PAIN, UNSPECIFIED ABDOMINAL LOCATION: ICD-10-CM

## 2023-02-23 DIAGNOSIS — R53.81 MALAISE AND FATIGUE: ICD-10-CM

## 2023-02-23 DIAGNOSIS — R50.9 FEVER, UNSPECIFIED FEVER CAUSE: ICD-10-CM

## 2023-02-23 DIAGNOSIS — R53.83 MALAISE AND FATIGUE: ICD-10-CM

## 2023-02-23 PROCEDURE — 74176 CT ABD & PELVIS W/O CONTRAST: CPT

## 2023-03-01 ENCOUNTER — TELEPHONE (OUTPATIENT)
Dept: ONCOLOGY | Facility: CLINIC | Age: 83
End: 2023-03-01
Payer: MEDICARE

## 2023-03-01 NOTE — TELEPHONE ENCOUNTER
Caller: Tessie Mckeon    Relationship: Self    Best call back number: [phone]    What is the best time to reach you: ASAP    Who are you requesting to speak with (clinical staff, provider,  specific staff member): SCHEDULING     What was the call regarding: PT HAD CT SCAN WITH DR BUSTOS AND IT WAS RECOMMEND SHE SEE A ONCOLOGY PROVIDER, PT WOULD LIKE TO BE SCHEDULED WITH DR DELVALLE SOONER THAN  9/19  PLEASE CALL TO ADVISE ON SCHEDULING     Do you require a callback: YES

## 2023-03-06 ENCOUNTER — OFFICE VISIT (OUTPATIENT)
Dept: ONCOLOGY | Facility: CLINIC | Age: 83
End: 2023-03-06
Payer: MEDICARE

## 2023-03-06 ENCOUNTER — LAB (OUTPATIENT)
Dept: LAB | Facility: HOSPITAL | Age: 83
End: 2023-03-06
Payer: MEDICARE

## 2023-03-06 VITALS
SYSTOLIC BLOOD PRESSURE: 178 MMHG | RESPIRATION RATE: 16 BRPM | OXYGEN SATURATION: 98 % | WEIGHT: 122.4 LBS | TEMPERATURE: 98 F | DIASTOLIC BLOOD PRESSURE: 84 MMHG | HEART RATE: 74 BPM | HEIGHT: 59 IN | BODY MASS INDEX: 24.68 KG/M2

## 2023-03-06 DIAGNOSIS — R59.0 PELVIC LYMPHADENOPATHY: ICD-10-CM

## 2023-03-06 DIAGNOSIS — D75.89 MACROCYTOSIS: ICD-10-CM

## 2023-03-06 DIAGNOSIS — D72.820 MONOCLONAL B-CELL LYMPHOCYTOSIS: Primary | ICD-10-CM

## 2023-03-06 DIAGNOSIS — D72.820 MONOCLONAL B-CELL LYMPHOCYTOSIS: ICD-10-CM

## 2023-03-06 LAB
ALBUMIN SERPL-MCNC: 4.2 G/DL (ref 3.5–5.2)
ALBUMIN/GLOB SERPL: 1.4 G/DL
ALP SERPL-CCNC: 51 U/L (ref 39–117)
ALT SERPL W P-5'-P-CCNC: 11 U/L (ref 1–33)
ANION GAP SERPL CALCULATED.3IONS-SCNC: 9 MMOL/L (ref 5–15)
AST SERPL-CCNC: 14 U/L (ref 1–32)
BASOPHILS # BLD AUTO: 0.04 10*3/MM3 (ref 0–0.2)
BASOPHILS NFR BLD AUTO: 0.6 % (ref 0–1.5)
BILIRUB SERPL-MCNC: 0.6 MG/DL (ref 0–1.2)
BUN SERPL-MCNC: 17 MG/DL (ref 8–23)
BUN/CREAT SERPL: 16.5 (ref 7–25)
CALCIUM SPEC-SCNC: 9.7 MG/DL (ref 8.6–10.5)
CHLORIDE SERPL-SCNC: 107 MMOL/L (ref 98–107)
CO2 SERPL-SCNC: 24 MMOL/L (ref 22–29)
CREAT SERPL-MCNC: 1.03 MG/DL (ref 0.57–1)
DEPRECATED RDW RBC AUTO: 47.4 FL (ref 37–54)
EGFRCR SERPLBLD CKD-EPI 2021: 53.7 ML/MIN/1.73
EOSINOPHIL # BLD AUTO: 0.04 10*3/MM3 (ref 0–0.4)
EOSINOPHIL NFR BLD AUTO: 0.6 % (ref 0.3–6.2)
ERYTHROCYTE [DISTWIDTH] IN BLOOD BY AUTOMATED COUNT: 12.3 % (ref 12.3–15.4)
GLOBULIN UR ELPH-MCNC: 2.9 GM/DL
GLUCOSE SERPL-MCNC: 104 MG/DL (ref 65–99)
HCT VFR BLD AUTO: 46.2 % (ref 34–46.6)
HGB BLD-MCNC: 14.9 G/DL (ref 12–15.9)
IMM GRANULOCYTES # BLD AUTO: 0.01 10*3/MM3 (ref 0–0.05)
IMM GRANULOCYTES NFR BLD AUTO: 0.2 % (ref 0–0.5)
LYMPHOCYTES # BLD AUTO: 2.62 10*3/MM3 (ref 0.7–3.1)
LYMPHOCYTES NFR BLD AUTO: 41.9 % (ref 19.6–45.3)
MCH RBC QN AUTO: 33.4 PG (ref 26.6–33)
MCHC RBC AUTO-ENTMCNC: 32.3 G/DL (ref 31.5–35.7)
MCV RBC AUTO: 103.6 FL (ref 79–97)
MONOCYTES # BLD AUTO: 0.41 10*3/MM3 (ref 0.1–0.9)
MONOCYTES NFR BLD AUTO: 6.5 % (ref 5–12)
NEUTROPHILS NFR BLD AUTO: 3.14 10*3/MM3 (ref 1.7–7)
NEUTROPHILS NFR BLD AUTO: 50.2 % (ref 42.7–76)
NRBC BLD AUTO-RTO: 0 /100 WBC (ref 0–0.2)
PLATELET # BLD AUTO: 169 10*3/MM3 (ref 140–450)
PMV BLD AUTO: 9.3 FL (ref 6–12)
POTASSIUM SERPL-SCNC: 4.2 MMOL/L (ref 3.5–5.2)
PROT SERPL-MCNC: 7.1 G/DL (ref 6–8.5)
RBC # BLD AUTO: 4.46 10*6/MM3 (ref 3.77–5.28)
SODIUM SERPL-SCNC: 140 MMOL/L (ref 136–145)
WBC NRBC COR # BLD: 6.26 10*3/MM3 (ref 3.4–10.8)

## 2023-03-06 PROCEDURE — 85025 COMPLETE CBC W/AUTO DIFF WBC: CPT

## 2023-03-06 PROCEDURE — 36415 COLL VENOUS BLD VENIPUNCTURE: CPT

## 2023-03-06 PROCEDURE — 99214 OFFICE O/P EST MOD 30 MIN: CPT | Performed by: INTERNAL MEDICINE

## 2023-03-06 PROCEDURE — 80053 COMPREHEN METABOLIC PANEL: CPT | Performed by: INTERNAL MEDICINE

## 2023-03-06 NOTE — PROGRESS NOTES
Subjective     REASON FOR FOLLOW UP:  B-cell monoclonal population in PB by flow cytometry  Macrocytosis                             HISTORY OF PRESENT ILLNESS:  The patient is a 82 y.o. 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  She is not on any treatment and seems to be doing well.  She was seeing us on an annual basis but she had recently developed some abdominal discomfort and low-grade fever and as a result of this her primary care physician Dr. Penaloza ordered a CT scan of the abdomen pelvis.  She was noted to have diverticulosis but no obvious diverticulitis.  She also had retroperitoneal and pelvic lymphadenopathy but no bulky nodes with the largest nodes being 2.5 cm in greatest diameter.    She feels fine in the office today.  She has not noted any peripheral lymphadenopathy.    Her blood counts today look good with a hemoglobin of 14.9.  Her white count is normal at 6260 with 50% neutrophils and 42% lymphocytes.  Her platelet count is normal also at 169,000.    I reassured her at this point that even though these lymph nodes may be pathologic and may represent a small lymphocytic lymphoma she certainly does not require treatment at this time with no bulky nodes and no significant cytopenias.    We will plan to follow her with a repeat CT scan again in 4 months to be sure that the nodes are not enlarging rapidly.         History of Present Illness     Past Medical History:   Diagnosis Date   • Arthritis    • Bacterial vaginitis    • Bronchitis    • Colon polyps    • Diverticulosis    • Dizziness    • Elevated lipids    • Fatigue    • GERD (gastroesophageal reflux disease)    • Headache    • Hematuria    • History of EKG     date?; borderline   • Hypertension 12/18/2022   • Hypovitaminosis D    • Lump, breast     Unable to remember which breast   • Macrocytosis    • Oral candidiasis    • Osteoporosis  "   • Postmenopausal bleeding    • Suprapubic tenderness    • Vaginal atrophy    • Yeast vaginitis         Past Surgical History:   Procedure Laterality Date   • BREAST EXCISIONAL BIOPSY Left     12-15 years ago in Dr. Hendricks's office   • BREAST SURGERY Left 2003    [facility]   • CATARACT EXTRACTION  2009   • COLONOSCOPY  10/23/2017    Dr Broderick   • DILATATION AND CURETTAGE  09/21/2015   • EYE SURGERY     • FOOT SURGERY Right 1999   • GUM SURGERY  1997   • UPPER GASTROINTESTINAL ENDOSCOPY  02/01/2021    EULALIA HERNANDEZ MD        ALLERGIES:    Allergies   Allergen Reactions   • Clindamycin/Lincomycin Other (See Comments)     Joint pain     • Proton Pump Inhibitors Other (See Comments)     Joint pain     • Pneumovax [Pneumococcal Polysaccharide Vaccine] Rash        Review of Systems   Constitutional: Negative for activity change, appetite change, fatigue, fever and unexpected weight change.   HENT: Negative for hearing loss, nosebleeds, trouble swallowing and voice change.    Eyes: Negative for visual disturbance.   Respiratory: Negative for cough, shortness of breath and wheezing.    Cardiovascular: Negative for chest pain and palpitations.   Gastrointestinal: Negative for abdominal pain, diarrhea, nausea and vomiting.   Genitourinary: Negative for difficulty urinating, frequency, hematuria and urgency.   Musculoskeletal: Positive for back pain. Negative for neck pain.   Skin: Negative for rash.   Neurological: Negative for dizziness, seizures, syncope and headaches.   Hematological: Negative for adenopathy. Does not bruise/bleed easily.   Psychiatric/Behavioral: Negative for behavioral problems. The patient is not nervous/anxious.         Objective     Vitals:    03/06/23 1528   Temp: 98 °F (36.7 °C)   TempSrc: Temporal   Weight: 55.5 kg (122 lb 6.4 oz)   Height: 149 cm (58.66\")   PainSc:   4   PainLoc: Back     Current Status 3/6/2023   ECOG score 0       Physical Exam   Constitutional: She is oriented to person, place, and " time. She appears well-developed. No distress.   HENT:   Head: Normocephalic.   Eyes: Pupils are equal, round, and reactive to light. Conjunctivae are normal. No scleral icterus.   Neck: No JVD present. No thyromegaly present.   Cardiovascular: Normal rate and regular rhythm. Exam reveals no gallop and no friction rub.   No murmur heard.  Pulmonary/Chest: Effort normal and breath sounds normal. She has no wheezes. She has no rales.   Abdominal: Soft. She exhibits no distension and no mass. There is no abdominal tenderness.   Musculoskeletal: Normal range of motion. No deformity.   Lymphadenopathy:     She has no cervical adenopathy.   Neurological: She is alert and oriented to person, place, and time. She has normal reflexes. No cranial nerve deficit.   Skin: Skin is warm and dry. No rash noted. No erythema.   Psychiatric: Her behavior is normal. Judgment normal.       I have reexamined the patient and the results are consistent with the previously documented exam. Darrel Roque MD     RECENT LABS:  Hematology WBC   Date Value Ref Range Status   03/06/2023 6.26 3.40 - 10.80 10*3/mm3 Final   02/02/2023 10.6 3.4 - 10.8 x10E3/uL Final     RBC   Date Value Ref Range Status   03/06/2023 4.46 3.77 - 5.28 10*6/mm3 Final   02/02/2023 4.13 3.77 - 5.28 x10E6/uL Final     Hemoglobin   Date Value Ref Range Status   03/06/2023 14.9 12.0 - 15.9 g/dL Final     Hematocrit   Date Value Ref Range Status   03/06/2023 46.2 34.0 - 46.6 % Final     Platelets   Date Value Ref Range Status   03/06/2023 169 140 - 450 10*3/mm3 Final      FLOW CYTOMETRY 8/16/16:  Small monoclonal B-cell population ( 16% ).       Assessment & Plan   1.  Mild chronic macrocytosis.  Stable  2.  Peripheral blood flow cytometry showing a very small (16%) monoclonal B-cell population.  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  The patient does not have any palpable lymphadenopathy and is having no new symptoms.  Her blood counts remain  normal with the exception of mild peripheral lymphocytosis(which remains stable).  3.  Lymphadenopathy noted on recent CT scan of the abdomen and pelvis.    Plan  1.  We reviewed the results of the CT scan of the abdomen and pelvis ordered by her primary care physician on 2/23/2023.  She does have some retroperitoneal and pelvic lymphadenopathy but no bulky lymphadenopathy.  The largest nodes with described as 2.5 cm.  Although they had increased in size to previous scan used for comparison purposes was from 4 years ago in 2019.  She remains asymptomatic and I have recommended continued observation for now.  2.  We will plan to see Ms. Mckeon back in the [place] office in 4 months with repeat CT scan of the chest abdomen and pelvis performed 1 week prior to that visit.  If the nodes appear stable on the next scan we probably can go to an every 6-month follow-up schedule from there.  If the scans show further significant progression of lymphadenopathy then we can talk with her about treatment options most likely with single agent Rituxan.

## 2023-07-28 ENCOUNTER — TRANSCRIBE ORDERS (OUTPATIENT)
Dept: ADMINISTRATIVE | Facility: HOSPITAL | Age: 83
End: 2023-07-28
Payer: MEDICARE

## 2023-07-28 DIAGNOSIS — Z12.31 SCREENING MAMMOGRAM FOR BREAST CANCER: Primary | ICD-10-CM

## 2023-08-04 ENCOUNTER — HOSPITAL ENCOUNTER (OUTPATIENT)
Dept: MAMMOGRAPHY | Facility: HOSPITAL | Age: 83
Discharge: HOME | End: 2023-08-04
Admitting: INTERNAL MEDICINE
Payer: MEDICARE

## 2023-08-04 DIAGNOSIS — Z12.31 SCREENING MAMMOGRAM FOR BREAST CANCER: ICD-10-CM

## 2023-08-04 PROCEDURE — 77067 SCR MAMMO BI INCL CAD: CPT

## 2023-08-04 PROCEDURE — 77063 BREAST TOMOSYNTHESIS BI: CPT

## 2023-08-10 ENCOUNTER — TRANSCRIBE ORDERS (OUTPATIENT)
Dept: ADMINISTRATIVE | Facility: HOSPITAL | Age: 83
End: 2023-08-10
Payer: MEDICARE

## 2023-08-10 DIAGNOSIS — R92.8 ABNORMAL MAMMOGRAM: Primary | ICD-10-CM

## 2023-09-01 ENCOUNTER — HOSPITAL ENCOUNTER (OUTPATIENT)
Dept: ULTRASOUND IMAGING | Facility: HOSPITAL | Age: 83
Discharge: HOME | End: 2023-09-01
Admitting: INTERNAL MEDICINE
Payer: MEDICARE

## 2023-09-01 DIAGNOSIS — R92.8 ABNORMAL MAMMOGRAM: ICD-10-CM

## 2023-09-01 PROCEDURE — 76882 US LMTD JT/FCL EVL NVASC XTR: CPT

## 2023-09-18 ENCOUNTER — TELEPHONE (OUTPATIENT)
Dept: SURGERY | Facility: CLINIC | Age: 83
End: 2023-09-18
Payer: MEDICARE

## 2023-09-18 NOTE — TELEPHONE ENCOUNTER
Patient scheduled for new patient visit 9/22/23 at 11:00 am     Patient is aware to arrive at 10:30 am

## 2023-09-22 ENCOUNTER — OFFICE VISIT (OUTPATIENT)
Dept: SURGERY | Facility: CLINIC | Age: 83
End: 2023-09-22
Payer: MEDICARE

## 2023-09-22 ENCOUNTER — HOSPITAL ENCOUNTER (OUTPATIENT)
Dept: SURGERY | Facility: HOSPITAL | Age: 83
Discharge: HOME | End: 2023-09-22
Payer: MEDICARE

## 2023-09-22 VITALS
BODY MASS INDEX: 23.95 KG/M2 | DIASTOLIC BLOOD PRESSURE: 100 MMHG | HEART RATE: 76 BPM | OXYGEN SATURATION: 97 % | RESPIRATION RATE: 17 BRPM | SYSTOLIC BLOOD PRESSURE: 172 MMHG | HEIGHT: 60 IN | WEIGHT: 122 LBS

## 2023-09-22 DIAGNOSIS — R93.89 ABNORMAL FINDING ON IMAGING: ICD-10-CM

## 2023-09-22 DIAGNOSIS — R59.0 AXILLARY LYMPHADENOPATHY: Primary | ICD-10-CM

## 2023-09-22 PROCEDURE — 88360 TUMOR IMMUNOHISTOCHEM/MANUAL: CPT | Performed by: SURGERY

## 2023-09-22 PROCEDURE — 88305 TISSUE EXAM BY PATHOLOGIST: CPT | Performed by: SURGERY

## 2023-09-22 PROCEDURE — 88342 IMHCHEM/IMCYTCHM 1ST ANTB: CPT | Performed by: SURGERY

## 2023-09-22 PROCEDURE — 88300 SURGICAL PATH GROSS: CPT | Performed by: SURGERY

## 2023-09-22 PROCEDURE — 88341 IMHCHEM/IMCYTCHM EA ADD ANTB: CPT | Performed by: SURGERY

## 2023-09-22 NOTE — PROGRESS NOTES
"BREAST SURGERY CONSULT      Referring Provider: Aaron Penaloza MD     Reason for referral: Bilateral axillary lymphadenopathy     Subjective   HPI: Ms. Tessie Mckeon is a 84 yo woman, seen at the request of Dr. Aaron Penaloza, for evaluation of bilateral axillary lymphadenopathy identified on her screening mammogram.  She is followed by Dr. 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  She last saw him in July with a CT which showed stable abdomen/pelvic lymph adenopathy, and bilateral axillary lymphadenopathy.  She had had multiple prior CT abdomen/pelvis, but this was her first CT chest documenting the axillary lymphadenopathy.  She then underwent screening mammogram in August, which demonstrated new bilateral axillary lymphadenopathy.  This was confirmed on bilateral axillary ultrasound.  She denies any breast related complaints. She denies any prior history of abnormal mammograms or breast biopsies. She has a family history of breast cancer in her sister (diagnosed at age 90-91).      I personally reviewed her records and summarized her relevant breast history/imagin2022, Screening MMG with [facility] ( [place]):  There are scattered areas of fibroglandular density.  There are no suspicious masses, calcifications, or areas of architectural distortion.  BI-RADS 1: Negative    07/10/2023, CT C/A/P w/Contrast ( [place]):  Chest:  There is bilateral axillary lymphadenopathy, with an enlarged right node measuring 2.2 x 1.6 cm on series 2, image 18 and an enlarged left-sided node measuring 2.1 x 1.8 cm on series 2, image 14. No enlarged  mediastinal or hilar lymph nodes are seen. The mediastinal vasculature is normal in caliber. There are coronary calcifications. The heart is normal in size and configuration, without pericardial effusion.  Abdomen and pelvis:  Cysts are seen in the liver " along with old granulomatous disease and lesions that are technically indeterminate, likely cysts. Cysts and lesions that are technically indeterminate, likely cysts, are seen in  the kidneys. There is stable appearing retroperitoneal lymphadenopathy.   A left para-aortic node measures 2.4 x 1.7 cm on series 2, image 91. Additional enlarged lymph nodes are stable, including the left external iliac node measuring 1.3 cm in short axis diameter on series 2, image 149. Colonic diverticula are seen, without CT evidence of acute diverticulitis.   The gallbladder, spleen, adrenal glands, pancreas, stomach, small bowel, urinary bladder, uterus, and abdominal vasculature are normal. No intraperitoneal fluid collection or free gas are seen.   Bone windows demonstrate degenerative changes, without suspicious osseous lesion seen.    08/04/2023, Screening MMG with [facility] ( [place]):  There are scattered areas of fibroglandular density.  There are no suspicious masses, calcifications, or areas of architectural distortion. There are prominent bilateral axillary lymph nodes.  BI-RADS 0: Incomplete    09/01/2023, US Axilla Bilateral ( [place]):  There are multiple prominent and enlarged bilateral axillary lymph nodes with cortical thickening, including dominant 1.3 x 1.5 x 3.4 cm and 2.2 x 2.2 x 1.7 cm right axillary lymph nodes and a dominant 3.2 x 2.1 x 3.5 cm left axillary lymph node.   Recent CT chest, abdomen, and pelvis from 7/10/2023 demonstrated additional enlarged abdominal and pelvic lymph nodes. These findings are suggestive of a systemic process, including possible lymphoma, rather than related to breast cancer. Given the change in the mammographic appearance of the axillary lymph nodes and lack of prior thoracic CT or axillary ultrasound for more direct comparison, further evaluation with ultrasound-guided core needle biopsy targeting a dominant/representative  lymph node on either side is recommended currently for  definitive tissue diagnosis. Flow cytometry is recommended.  BI-RADS 4: Suspicious       Review of Systems   Constitutional:  Negative for appetite change, chills, diaphoresis, fatigue, fever and unexpected weight change.   HENT:   Negative for hearing loss, lump/mass, mouth sores, nosebleeds, sore throat, tinnitus, trouble swallowing and voice change.    Eyes:  Negative for eye problems and icterus.   Respiratory:  Negative for chest tightness, cough, hemoptysis, shortness of breath and wheezing.    Cardiovascular:  Negative for chest pain, leg swelling and palpitations.   Gastrointestinal:  Negative for abdominal distention, abdominal pain, blood in stool, constipation, diarrhea, nausea, rectal pain and vomiting.   Endocrine: Negative for hot flashes.   Genitourinary:  Negative for bladder incontinence, difficulty urinating, dyspareunia, dysuria, frequency, hematuria, menstrual problem, nocturia, pelvic pain, vaginal bleeding and vaginal discharge.    Musculoskeletal:  Negative for arthralgias, back pain, flank pain, gait problem, myalgias, neck pain and neck stiffness.   Skin:  Negative for itching, rash and wound.   Neurological:  Negative for dizziness, extremity weakness, gait problem, headaches, light-headedness, numbness, seizures and speech difficulty.   Hematological:  Negative for adenopathy. Does not bruise/bleed easily.   Psychiatric/Behavioral:  Negative for confusion, decreased concentration, depression, sleep disturbance and suicidal ideas. The patient is not nervous/anxious.      Medications:    Current Outpatient Medications:     famotidine (PEPCID) 40 MG tablet, Take 1 tablet by mouth Every Night., Disp: 90 tablet, Rfl: 0    influenza vac split quad (FLUZONE,FLUARIX,AFLURIA,FLULAVAL) 0.5 ML suspension prefilled syringe injection, ADM 0.5ML IM UTD, Disp: , Rfl:     sucralfate (CARAFATE) 1 g tablet, TAKE 1 TABLET BY MOUTH FOUR TIMES A DAY BEFORE MEALS, Disp: , Rfl:     verapamil SR (CALAN-SR) 180 MG  CR tablet, Take 1 tablet by mouth Every Night., Disp: 30 tablet, Rfl: 0      Allergies   Allergen Reactions    Clindamycin/Lincomycin Other (See Comments)     Joint pain      Proton Pump Inhibitors Other (See Comments)     Joint pain      Pneumovax [Pneumococcal Polysaccharide Vaccine] Rash       Past Medical History:   Diagnosis Date    Arthritis     Bacterial vaginitis     Bronchitis     Colon polyps     Diverticulosis     Dizziness     Elevated lipids     Fatigue     GERD (gastroesophageal reflux disease)     Headache     Hematuria     History of EKG     date?; borderline    Hypertension 12/18/2022    Hypovitaminosis D     Lump, breast     Unable to remember which breast    Macrocytosis     Oral candidiasis     Osteoporosis     Postmenopausal bleeding     Suprapubic tenderness     Vaginal atrophy     Yeast vaginitis        Past Surgical History:   Procedure Laterality Date    BREAST EXCISIONAL BIOPSY Left     12-15 years ago in Dr. Hendricks's office    BREAST SURGERY Left 2003    [facility]    CATARACT EXTRACTION  2009    COLONOSCOPY  10/23/2017    Dr Broderick    DILATATION AND CURETTAGE  09/21/2015    EYE SURGERY      FOOT SURGERY Right 1999    GUM SURGERY  1997    UPPER GASTROINTESTINAL ENDOSCOPY  02/01/2021    EULALIA HERNANDEZ MD       Family History   Problem Relation Age of Onset    Heart disease Mother     Heart attack Mother     Other Mother         GERD    No Known Problems Father     Breast cancer Sister         Diagnosed at age 90-91    No Known Problems Brother     No Known Problems Maternal Grandmother     No Known Problems Maternal Grandfather     No Known Problems Paternal Grandmother     No Known Problems Paternal Grandfather     No Known Problems Maternal Aunt     No Known Problems Maternal Uncle     No Known Problems Paternal Aunt     No Known Problems Paternal Uncle     Ovarian cancer Neg Hx      Social History     Socioeconomic History    Marital status:      Spouse name: Sina    Number of children:  1    Years of education: High School   Tobacco Use    Smoking status: Never    Smokeless tobacco: Never   Vaping Use    Vaping Use: Never used   Substance and Sexual Activity    Alcohol use: No    Drug use: No    Sexual activity: Defer     Patient drinks 1 servings of caffeine per day.       GYNECOLOGIC HISTORY:   . P: 1. AB: 0.  Last menstrual period: Postmenopausal  Age at menarche: 10  Age at first childbirth: 16  Lactation/How long: n/a  Age at menopause: can not remember   Total years of oral contraceptive use: 0  Total years of hormone replacement therapy: unsure, took for a short time      Objective   PHYSICAL EXAMINATION  Vitals:    23 1109   BP: 172/100   Pulse: 76   Resp: 17   SpO2: 97%     ECOG 0 - Asymptomatic  General: NAD, well appearing  Psych: a&o x 3, normal mood and affect  Eyes: EOMI, no scleral icterus  ENMT: neck supple without masses or thyromegaly, mucus membranes moist  Resp: normal effort, CTAB  CV: RRR, no murmurs, no edema  GI: soft, NT, ND  MSK: normal gait, normal ROM in bilateral shoulders  Lymph nodes: + Bilateral axillary lymphadenopathy  Breast: symmetric, moderate size, grade 3 ptosis  Right: No visible abnormalities on inspection while seated, with arms raised or hands on hips. No masses, skin changes, or nipple abnormalities.  Left: No visible abnormalities on inspection while seated, with arms raised or hands on hips. No masses, skin changes, or nipple abnormalities.      Procedure: Percutaneous ultrasound-guided vacuum-assisted core biopsy of axillary lymph node x 2  Indication: Bilateral axillary lymphadenopathy with history of monoclonal B-cell proliferation  Location: Bilateral axilla  Consent: The risks, benefits, and alternatives to the procedure were discussed with the patient, who understood and wished to proceed. The risks described included, but were not limited to, bleeding, infection, pneumothorax, injury to the axillary vein.  Description of Bilateral  Axillary Ultrasound: After the patient was positioned supine on the procedure table with her arm raised over her head, I scanned her axilla using a 10MHz linear transducer.   In the right axilla, there is an enlarged lymph node with an irregularly thickened cortex measuring at least 3 cm.  This lymph node was selected for biopsy.  In the left axilla, rounded lymph node measuring 1.3 cm adjacent to the pectoralis muscle.  This lymph node was selected for biopsy.  Description of Percutaneous Biopsy:   Left axilla:  I scanned her axilla using a 10MHz linear transducer and located the suspicious lymph node as described above. The area was prepped with alcohol and draped in sterile fashion. I anesthetized the axillary skin at the site of anticipated incision with 1% lidocaine with epinephrine. I then anesthetized the deeper tissue with 1% lidocaine with epinephrine under ultrasound visualization and guidance. In total, 15 mL of local anesthetic was used. I then made a nicking incision with an 11 blade and inserted the 14 G Bard Marquee biopsy device from inferolateral to superomedial through the node under ultrasound guidance. I took 4 core samples of the lesion under direct visualization with ultrasound. I withdrew the biopsy device and placed a hydromark marker into the biopsy site. The marker was visualized within the node after placement.  2 core samples were sent for regular histology and the other 2 were sent for flow cytometry.  2.  Right axilla  I scanned her axilla using a 10MHz linear transducer and located the suspicious lymph node as described above. The area was prepped with alcohol and draped in sterile fashion. I anesthetized the axillary skin at the site of anticipated incision with 1% lidocaine with epinephrine. I then anesthetized the deeper tissue with 1% lidocaine with epinephrine under ultrasound visualization and guidance. In total, 15 mL of local anesthetic was used. I then made a nicking incision  with an 11 blade and inserted the 14 G Bard Marquee biopsy device from inferolateral to superomedial through the node under ultrasound guidance. I took 4 core samples of the lesion under direct visualization with ultrasound. I withdrew the biopsy device and placed a hydromark marker into the biopsy site. The marker was visualized within the node after placement.  2 core samples were sent for regular histology and the other 2 were sent for flow cytometry.  We held manual compression at both sites for 10 minutes, placed steri-strips over the skin incision, and wrapped the patient in a 6 inch ace wrap and a cold pack.  Marker placed: Hydromark  Tolerance: The patient tolerated the procedure well.  Disposition: Port to be placed later today and I will see her back [facility] through chemotherapy.      Assessment & Plan   Assessment:  83 y.o. F with a bilateral axillary lymphadenopathy in the setting of stable retroperitoneal and pelvic lymphadenopathy.  She is currently being followed by medical oncology for a monoclonal B-cell population.    Plan:  -I reassured the patient that I do not believe this is related to any underlying breast pathology.  I spoke with Dr. Roque on the phone today and he was in agreement with the plan for bilateral biopsy.  He will follow-up with her regarding the results and next steps.  She will see me as necessary.    Charissa Lepe MD    I spent 30 minutes caring for Tessie on this date of service. This time includes time spent by me in the following activities: preparing for the visit, performing a medically appropriate examination and/or evaluation , counseling and educating the patient/family/caregiver, ordering medications, tests, or procedures, referring and communicating with other health care professionals , documenting information in the medical record, and independently interpreting results and communicating that information with the patient/family/caregiver.  I spent 20 minutes on  the separately reported service of bilateral axillary lymph node biopsy. This time is not included in the time used to support the E/M service also reported today.      CC:  MD Darrel Crooks MD

## 2023-09-22 NOTE — LETTER
"2023       No Recipients    Patient: Tessie Mckeon   YOB: 1940   Date of Visit: 2023     Dear Aaron Penaloza MD:       Thank you for referring Tessie Mckeon to me for evaluation. Below are the relevant portions of my assessment and plan of care.    If you have questions, please do not hesitate to call me. I look forward to following Tessie along with you.         Sincerely,        Charissa Lepe MD        CC:   No Recipients    Charissa Lepe MD  23 [id number]  Sign when Signing Visit  BREAST SURGERY CONSULT      Referring Provider: Aaron Penaloza MD     Reason for referral: Bilateral axillary lymphadenopathy     Subjective   HPI: Ms. Tessie Mckeon is a 84 yo woman, seen at the request of Dr. Aaron Penaloza, for evaluation of bilateral axillary lymphadenopathy identified on her screening mammogram.  She is followed by Dr. 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  She last saw him in July with a CT which showed stable abdomen/pelvic lymph adenopathy, and bilateral axillary lymphadenopathy.  She had had multiple prior CT abdomen/pelvis, but this was her first CT chest documenting the axillary lymphadenopathy.  She then underwent screening mammogram in August, which demonstrated new bilateral axillary lymphadenopathy.  This was confirmed on bilateral axillary ultrasound.  She denies any breast related complaints. She denies any prior history of abnormal mammograms or breast biopsies. She has a family history of breast cancer in her sister (diagnosed at age 90-91).      I personally reviewed her records and summarized her relevant breast history/imagin2022, Screening MMG with Raheem ([facility]):  There are scattered areas of fibroglandular density.  There are no suspicious masses, calcifications, or areas of architectural " distortion.  BI-RADS 1: Negative    07/10/2023, CT C/A/P w/Contrast ( [place]):  Chest:  There is bilateral axillary lymphadenopathy, with an enlarged right node measuring 2.2 x 1.6 cm on series 2, image 18 and an enlarged left-sided node measuring 2.1 x 1.8 cm on series 2, image 14. No enlarged  mediastinal or hilar lymph nodes are seen. The mediastinal vasculature is normal in caliber. There are coronary calcifications. The heart is normal in size and configuration, without pericardial effusion.  Abdomen and pelvis:  Cysts are seen in the liver along with old granulomatous disease and lesions that are technically indeterminate, likely cysts. Cysts and lesions that are technically indeterminate, likely cysts, are seen in  the kidneys. There is stable appearing retroperitoneal lymphadenopathy.   A left para-aortic node measures 2.4 x 1.7 cm on series 2, image 91. Additional enlarged lymph nodes are stable, including the left external iliac node measuring 1.3 cm in short axis diameter on series 2, image 149. Colonic diverticula are seen, without CT evidence of acute diverticulitis.   The gallbladder, spleen, adrenal glands, pancreas, stomach, small bowel, urinary bladder, uterus, and abdominal vasculature are normal. No intraperitoneal fluid collection or free gas are seen.   Bone windows demonstrate degenerative changes, without suspicious osseous lesion seen.    08/04/2023, Screening MMG with Raheem ( [place]):  There are scattered areas of fibroglandular density.  There are no suspicious masses, calcifications, or areas of architectural distortion. There are prominent bilateral axillary lymph nodes.  BI-RADS 0: Incomplete    09/01/2023, US Axilla Bilateral ( [place]):  There are multiple prominent and enlarged bilateral axillary lymph nodes with cortical thickening, including dominant 1.3 x 1.5 x 3.4 cm and 2.2 x 2.2 x 1.7 cm right axillary lymph nodes and a dominant 3.2 x 2.1 x 3.5 cm left axillary lymph  node.   Recent CT chest, abdomen, and pelvis from 7/10/2023 demonstrated additional enlarged abdominal and pelvic lymph nodes. These findings are suggestive of a systemic process, including possible lymphoma, rather than related to breast cancer. Given the change in the mammographic appearance of the axillary lymph nodes and lack of prior thoracic CT or axillary ultrasound for more direct comparison, further evaluation with ultrasound-guided core needle biopsy targeting a dominant/representative  lymph node on either side is recommended currently for definitive tissue diagnosis. Flow cytometry is recommended.  BI-RADS 4: Suspicious       Review of Systems   Constitutional:  Negative for appetite change, chills, diaphoresis, fatigue, fever and unexpected weight change.   HENT:   Negative for hearing loss, lump/mass, mouth sores, nosebleeds, sore throat, tinnitus, trouble swallowing and voice change.    Eyes:  Negative for eye problems and icterus.   Respiratory:  Negative for chest tightness, cough, hemoptysis, shortness of breath and wheezing.    Cardiovascular:  Negative for chest pain, leg swelling and palpitations.   Gastrointestinal:  Negative for abdominal distention, abdominal pain, blood in stool, constipation, diarrhea, nausea, rectal pain and vomiting.   Endocrine: Negative for hot flashes.   Genitourinary:  Negative for bladder incontinence, difficulty urinating, dyspareunia, dysuria, frequency, hematuria, menstrual problem, nocturia, pelvic pain, vaginal bleeding and vaginal discharge.    Musculoskeletal:  Negative for arthralgias, back pain, flank pain, gait problem, myalgias, neck pain and neck stiffness.   Skin:  Negative for itching, rash and wound.   Neurological:  Negative for dizziness, extremity weakness, gait problem, headaches, light-headedness, numbness, seizures and speech difficulty.   Hematological:  Negative for adenopathy. Does not bruise/bleed easily.   Psychiatric/Behavioral:  Negative  for confusion, decreased concentration, depression, sleep disturbance and suicidal ideas. The patient is not nervous/anxious.      Medications:    Current Outpatient Medications:   •  famotidine (PEPCID) 40 MG tablet, Take 1 tablet by mouth Every Night., Disp: 90 tablet, Rfl: 0  •  influenza vac split quad (FLUZONE,FLUARIX,AFLURIA,FLULAVAL) 0.5 ML suspension prefilled syringe injection, ADM 0.5ML IM UTD, Disp: , Rfl:   •  sucralfate (CARAFATE) 1 g tablet, TAKE 1 TABLET BY MOUTH FOUR TIMES A DAY BEFORE MEALS, Disp: , Rfl:   •  verapamil SR (CALAN-SR) 180 MG CR tablet, Take 1 tablet by mouth Every Night., Disp: 30 tablet, Rfl: 0      Allergies   Allergen Reactions   • Clindamycin/Lincomycin Other (See Comments)     Joint pain     • Proton Pump Inhibitors Other (See Comments)     Joint pain     • Pneumovax [Pneumococcal Polysaccharide Vaccine] Rash       Past Medical History:   Diagnosis Date   • Arthritis    • Bacterial vaginitis    • Bronchitis    • Colon polyps    • Diverticulosis    • Dizziness    • Elevated lipids    • Fatigue    • GERD (gastroesophageal reflux disease)    • Headache    • Hematuria    • History of EKG     date?; borderline   • Hypertension 12/18/2022   • Hypovitaminosis D    • Lump, breast     Unable to remember which breast   • Macrocytosis    • Oral candidiasis    • Osteoporosis    • Postmenopausal bleeding    • Suprapubic tenderness    • Vaginal atrophy    • Yeast vaginitis        Past Surgical History:   Procedure Laterality Date   • BREAST EXCISIONAL BIOPSY Left     12-15 years ago in Dr. Hendricks's office   • BREAST SURGERY Left 2003    [facility]   • CATARACT EXTRACTION  2009   • COLONOSCOPY  10/23/2017    Dr Broderick   • DILATATION AND CURETTAGE  09/21/2015   • EYE SURGERY     • FOOT SURGERY Right 1999   • GUM SURGERY  1997   • UPPER GASTROINTESTINAL ENDOSCOPY  02/01/2021    EULALIA HERNANDEZ MD       Family History   Problem Relation Age of Onset   • Heart disease Mother    • Heart attack Mother    •  Other Mother         GERD   • No Known Problems Father    • Breast cancer Sister         Diagnosed at age 90-91   • No Known Problems Brother    • No Known Problems Maternal Grandmother    • No Known Problems Maternal Grandfather    • No Known Problems Paternal Grandmother    • No Known Problems Paternal Grandfather    • No Known Problems Maternal Aunt    • No Known Problems Maternal Uncle    • No Known Problems Paternal Aunt    • No Known Problems Paternal Uncle    • Ovarian cancer Neg Hx      Social History     Socioeconomic History   • Marital status:      Spouse name: Sina   • Number of children: 1   • Years of education: High School   Tobacco Use   • Smoking status: Never   • Smokeless tobacco: Never   Vaping Use   • Vaping Use: Never used   Substance and Sexual Activity   • Alcohol use: No   • Drug use: No   • Sexual activity: Defer     Patient drinks 1 servings of caffeine per day.       GYNECOLOGIC HISTORY:   . P: 1. AB: 0.  Last menstrual period: Postmenopausal  Age at menarche: 10  Age at first childbirth: 16  Lactation/How long: n/a  Age at menopause: can not remember   Total years of oral contraceptive use: 0  Total years of hormone replacement therapy: unsure, took for a short time      Objective   PHYSICAL EXAMINATION  Vitals:    23 1109   BP: 172/100   Pulse: 76   Resp: 17   SpO2: 97%     ECOG 0 - Asymptomatic  General: NAD, well appearing  Psych: a&o x 3, normal mood and affect  Eyes: EOMI, no scleral icterus  ENMT: neck supple without masses or thyromegaly, mucus membranes moist  Resp: normal effort, CTAB  CV: RRR, no murmurs, no edema  GI: soft, NT, ND  MSK: normal gait, normal ROM in bilateral shoulders  Lymph nodes: + Bilateral axillary lymphadenopathy  Breast: symmetric, moderate size, grade 3 ptosis  Right: No visible abnormalities on inspection while seated, with arms raised or hands on hips. No masses, skin changes, or nipple abnormalities.  Left: No visible abnormalities on  inspection while seated, with arms raised or hands on hips. No masses, skin changes, or nipple abnormalities.      Procedure: Percutaneous ultrasound-guided vacuum-assisted core biopsy of axillary lymph node x 2  Indication: Bilateral axillary lymphadenopathy with history of monoclonal B-cell proliferation  Location: Bilateral axilla  Consent: The risks, benefits, and alternatives to the procedure were discussed with the patient, who understood and wished to proceed. The risks described included, but were not limited to, bleeding, infection, pneumothorax, injury to the axillary vein.  Description of Bilateral Axillary Ultrasound: After the patient was positioned supine on the procedure table with her arm raised over her head, I scanned her axilla using a 10MHz linear transducer.   In the right axilla, there is an enlarged lymph node with an irregularly thickened cortex measuring at least 3 cm.  This lymph node was selected for biopsy.  In the left axilla, rounded lymph node measuring 1.3 cm adjacent to the pectoralis muscle.  This lymph node was selected for biopsy.  Description of Percutaneous Biopsy:   Left axilla:  I scanned her axilla using a 10MHz linear transducer and located the suspicious lymph node as described above. The area was prepped with alcohol and draped in sterile fashion. I anesthetized the axillary skin at the site of anticipated incision with 1% lidocaine with epinephrine. I then anesthetized the deeper tissue with 1% lidocaine with epinephrine under ultrasound visualization and guidance. In total, 15 mL of local anesthetic was used. I then made a nicking incision with an 11 blade and inserted the 14 G Bard Marquee biopsy device from inferolateral to superomedial through the node under ultrasound guidance. I took 4 core samples of the lesion under direct visualization with ultrasound. I withdrew the biopsy device and placed a hydromark marker into the biopsy site. The marker was visualized  within the node after placement.  2 core samples were sent for regular histology and the other 2 were sent for flow cytometry.  2.  Right axilla  I scanned her axilla using a 10MHz linear transducer and located the suspicious lymph node as described above. The area was prepped with alcohol and draped in sterile fashion. I anesthetized the axillary skin at the site of anticipated incision with 1% lidocaine with epinephrine. I then anesthetized the deeper tissue with 1% lidocaine with epinephrine under ultrasound visualization and guidance. In total, 15 mL of local anesthetic was used. I then made a nicking incision with an 11 blade and inserted the 14 G Bard Marquee biopsy device from inferolateral to superomedial through the node under ultrasound guidance. I took 4 core samples of the lesion under direct visualization with ultrasound. I withdrew the biopsy device and placed a hydromark marker into the biopsy site. The marker was visualized within the node after placement.  2 core samples were sent for regular histology and the other 2 were sent for flow cytometry.  We held manual compression at both sites for 10 minutes, placed steri-strips over the skin incision, and wrapped the patient in a 6 inch ace wrap and a cold pack.  Marker placed: Hydromark  Tolerance: The patient tolerated the procedure well.  Disposition: Port to be placed later today and I will see her back senior living through chemotherapy.      Assessment & Plan   Assessment:  83 y.o. F with a bilateral axillary lymphadenopathy in the setting of stable retroperitoneal and pelvic lymphadenopathy.  She is currently being followed by medical oncology for a monoclonal B-cell population.    Plan:  -I reassured the patient that I do not believe this is related to any underlying breast pathology.  I spoke with Dr. Roque on the phone today and he was in agreement with the plan for bilateral biopsy.  He will follow-up with her regarding the results and next steps.   She will see me as necessary.    Charissa Lepe MD    I spent 30 minutes caring for Tessie on this date of service. This time includes time spent by me in the following activities: preparing for the visit, performing a medically appropriate examination and/or evaluation , counseling and educating the patient/family/caregiver, ordering medications, tests, or procedures, referring and communicating with other health care professionals , documenting information in the medical record, and independently interpreting results and communicating that information with the patient/family/caregiver.  I spent 20 minutes on the separately reported service of bilateral axillary lymph node biopsy. This time is not included in the time used to support the E/M service also reported today.      CC:  MD Darrel Crooks MD

## 2023-09-22 NOTE — PROGRESS NOTES
I received a call from Dr. Charissa Lepe regarding Ms. Mckeon.  She was found to have lymphadenopathy on her recent mammogram and ultrasound and was referred to Dr. Lepe for possible needle biopsy.    I told Dr. Lepe that I suspect this is related to her low-grade lymphoproliferative process but it may be reasonable to proceed with a biopsy to rule out any other potential malignancy.    She will likely be referring this Mike back to us after the biopsy and will send us a copy of the pathology report.

## 2023-09-28 ENCOUNTER — TELEPHONE (OUTPATIENT)
Dept: SURGERY | Facility: CLINIC | Age: 83
End: 2023-09-28
Payer: MEDICARE

## 2023-09-28 LAB
DX PRELIMINARY: NORMAL
LAB AP CASE REPORT: NORMAL
LAB AP CLINICAL INFORMATION: NORMAL
LAB AP DIAGNOSIS COMMENT: NORMAL
LAB AP FLOW CYTOMETRY SUMMARY: NORMAL
LAB AP SPECIAL STAINS: NORMAL
PATH REPORT.ADDENDUM SPEC: NORMAL
PATH REPORT.FINAL DX SPEC: NORMAL
PATH REPORT.GROSS SPEC: NORMAL

## 2023-09-28 NOTE — TELEPHONE ENCOUNTER
I called Ms. Mckeon and let her know that the bilateral lymph node biopsy results confirmed lymphoma.  She is already being followed for this by Dr. Roque and I will forward him the results.  She also is already scheduled for follow-up with him in January.  I let her know that his office will reach out if she needs to be seen sooner.  She has a little bit of expected pain under her armpits, but this is getting better.     Charissa Lepe MD

## 2023-10-03 LAB
DX PRELIMINARY: NORMAL
LAB AP CASE REPORT: NORMAL
LAB AP CLINICAL INFORMATION: NORMAL
LAB AP DIAGNOSIS COMMENT: NORMAL
LAB AP FLOW CYTOMETRY SUMMARY: NORMAL
LAB AP SPECIAL STAINS: NORMAL
Lab: NORMAL
PATH REPORT.ADDENDUM SPEC: NORMAL
PATH REPORT.FINAL DX SPEC: NORMAL
PATH REPORT.GROSS SPEC: NORMAL

## 2023-10-05 ENCOUNTER — TELEPHONE (OUTPATIENT)
Dept: ONCOLOGY | Facility: CLINIC | Age: 83
End: 2023-10-05
Payer: MEDICARE

## 2023-10-05 ENCOUNTER — TELEPHONE (OUTPATIENT)
Dept: SURGERY | Facility: CLINIC | Age: 83
End: 2023-10-05
Payer: MEDICARE

## 2023-10-05 NOTE — TELEPHONE ENCOUNTER
Ms. Mckeon called to ask a couple questions. She was last seen on 9/22/23 and would like to know how long the tape that is on her arm should be there? She thought it was supposed to fall off on it's own but is still on her arm    Also, her left arm is still hurting and wants to know how long that should last as well.    Please call her at [phone]    Felisa

## 2023-10-05 NOTE — TELEPHONE ENCOUNTER
Caller: Tessie Mckeon    Relationship: Self    Best call back number: [phone]     What is the best time to reach you: ANYTIME    Who are you requesting to speak with (clinical staff, provider,  specific staff member): DR DELVALLE    What was the call regarding: PATIENT IS ASKING TO SPEAK TO DR. DELVALLE ABOUT A TEST THAT SHE HAD DONE WITH BHUPINDER YANG.

## 2023-10-05 NOTE — TELEPHONE ENCOUNTER
Reviewed pt's pathology report from her biopsy by Dr. Lepe and returned pts call. Dr. Roque is out of the office this week, but will review with him on his return as pt has quite a few questions for him re: this result. She v/u. Also stated she had a question about her incision and I advised her she would need to contact Dr. Lepe's office about that and she v/u.

## 2023-10-09 ENCOUNTER — TELEPHONE (OUTPATIENT)
Dept: ONCOLOGY | Facility: CLINIC | Age: 83
End: 2023-10-09
Payer: MEDICARE

## 2023-10-09 NOTE — TELEPHONE ENCOUNTER
Pt had called with questions re: her recent biopsy. Reviewed with Dr. 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 No need for concern at this time and she can keep her regularly scheduled appointment in January. Pt was relieved to hear this news and had not further questions or concerns.

## 2023-11-06 ENCOUNTER — TELEPHONE (OUTPATIENT)
Dept: ONCOLOGY | Facility: CLINIC | Age: 83
End: 2023-11-06
Payer: MEDICARE

## 2023-11-06 NOTE — TELEPHONE ENCOUNTER
Called the patient to let her know that she could get her covid vaccine. She then asked about RSV and  let her know that would be fine. Patient v/u.

## 2023-11-06 NOTE — TELEPHONE ENCOUNTER
Caller: Tessie Mckeon    Relationship: Self    Best call back number: [phone]      What was the call regarding: PT WANTED TO KNOW IF SHE CAN HAVE THE COVID VACCINE    SHE HAD HER FLU SHOT THIS PAST FRIDAY    PLEASE CALL TO ADVISE

## 2023-11-27 ENCOUNTER — TELEPHONE (OUTPATIENT)
Dept: ONCOLOGY | Facility: CLINIC | Age: 83
End: 2023-11-27
Payer: MEDICARE

## 2023-11-27 DIAGNOSIS — I48.91 ATRIAL FIBRILLATION, UNSPECIFIED TYPE: Primary | ICD-10-CM

## 2023-11-27 NOTE — TELEPHONE ENCOUNTER
Patient was seen in [place] at [facility] and she was diagnosed with atrial fibrillation and they had her start eliquis but it is too costly and she wants to see a Cardiologist here in [place] and she comes back next week. Her ER visit is in care Everywhere. I let her know I would talk with Dr. Roque to see if we could place a referral. Patient v/u.

## 2023-11-27 NOTE — TELEPHONE ENCOUNTER
"  Caller: Tessie Mckeon    Relationship: Self    Best call back number: [phone]    What is the best time to reach you: ANY    Who are you requesting to speak with (clinical staff, provider,  specific staff member): CLINICAL      What was the call regarding: PATIENT CALLED TO GET DR DELVALLE'S OPINION ON HER \"IRREGULAR HEARTBEAT\".  TESSIE STATED THAT SHE WAS SENT TO HOSPITAL FOR IRREGULAR HEARTBEAT AND THAT THEY RECOMMENDED SHE STARTED TAKING ELLIQUIS BUT IT WAS 5OO DOLLARS A MONTH AND TOO EXPENSIVE. TESSIE WANTED TO GET DR DELVALLE'S OPINION ON WHAT SHE SHOULD DO.     Is it okay if the provider responds through MyChart: NO          "

## 2023-11-27 NOTE — TELEPHONE ENCOUNTER
Called the patient to let her know we placed a referral for her to Cardiology per Dr. Roque. Patient v/u.

## 2023-12-29 ENCOUNTER — LAB (OUTPATIENT)
Dept: LAB | Facility: HOSPITAL | Age: 83
End: 2023-12-29
Payer: MEDICARE

## 2023-12-29 ENCOUNTER — TELEPHONE (OUTPATIENT)
Age: 83
End: 2023-12-29
Payer: MEDICARE

## 2023-12-29 ENCOUNTER — OFFICE VISIT (OUTPATIENT)
Dept: CARDIOLOGY | Facility: CLINIC | Age: 83
End: 2023-12-29
Payer: MEDICARE

## 2023-12-29 VITALS
SYSTOLIC BLOOD PRESSURE: 166 MMHG | BODY MASS INDEX: 25.32 KG/M2 | WEIGHT: 129 LBS | DIASTOLIC BLOOD PRESSURE: 90 MMHG | HEART RATE: 77 BPM | HEIGHT: 60 IN

## 2023-12-29 DIAGNOSIS — R06.09 DOE (DYSPNEA ON EXERTION): ICD-10-CM

## 2023-12-29 DIAGNOSIS — I48.0 PAROXYSMAL ATRIAL FIBRILLATION: Primary | ICD-10-CM

## 2023-12-29 DIAGNOSIS — I48.0 PAROXYSMAL ATRIAL FIBRILLATION: ICD-10-CM

## 2023-12-29 LAB
ALBUMIN SERPL-MCNC: 4.2 G/DL (ref 3.5–5.2)
ALBUMIN/GLOB SERPL: 1.5 G/DL
ALP SERPL-CCNC: 67 U/L (ref 39–117)
ALT SERPL W P-5'-P-CCNC: 10 U/L (ref 1–33)
ANION GAP SERPL CALCULATED.3IONS-SCNC: 11.6 MMOL/L (ref 5–15)
AST SERPL-CCNC: 15 U/L (ref 1–32)
BASOPHILS # BLD AUTO: 0.03 10*3/MM3 (ref 0–0.2)
BASOPHILS NFR BLD AUTO: 0.3 % (ref 0–1.5)
BILIRUB SERPL-MCNC: 0.4 MG/DL (ref 0–1.2)
BUN SERPL-MCNC: 21 MG/DL (ref 8–23)
BUN/CREAT SERPL: 20.8 (ref 7–25)
CALCIUM SPEC-SCNC: 9.4 MG/DL (ref 8.6–10.5)
CHLORIDE SERPL-SCNC: 107 MMOL/L (ref 98–107)
CO2 SERPL-SCNC: 24.4 MMOL/L (ref 22–29)
CREAT SERPL-MCNC: 1.01 MG/DL (ref 0.57–1)
DEPRECATED RDW RBC AUTO: 46.4 FL (ref 37–54)
EGFRCR SERPLBLD CKD-EPI 2021: 54.7 ML/MIN/1.73
EOSINOPHIL # BLD AUTO: 0.07 10*3/MM3 (ref 0–0.4)
EOSINOPHIL NFR BLD AUTO: 0.7 % (ref 0.3–6.2)
ERYTHROCYTE [DISTWIDTH] IN BLOOD BY AUTOMATED COUNT: 12.7 % (ref 12.3–15.4)
GLOBULIN UR ELPH-MCNC: 2.8 GM/DL
GLUCOSE SERPL-MCNC: 104 MG/DL (ref 65–99)
HCT VFR BLD AUTO: 42.4 % (ref 34–46.6)
HGB BLD-MCNC: 14.3 G/DL (ref 12–15.9)
IMM GRANULOCYTES # BLD AUTO: 0.02 10*3/MM3 (ref 0–0.05)
IMM GRANULOCYTES NFR BLD AUTO: 0.2 % (ref 0–0.5)
LYMPHOCYTES # BLD AUTO: 4.64 10*3/MM3 (ref 0.7–3.1)
LYMPHOCYTES NFR BLD AUTO: 49 % (ref 19.6–45.3)
MCH RBC QN AUTO: 34 PG (ref 26.6–33)
MCHC RBC AUTO-ENTMCNC: 33.7 G/DL (ref 31.5–35.7)
MCV RBC AUTO: 100.7 FL (ref 79–97)
MONOCYTES # BLD AUTO: 0.41 10*3/MM3 (ref 0.1–0.9)
MONOCYTES NFR BLD AUTO: 4.3 % (ref 5–12)
NEUTROPHILS NFR BLD AUTO: 4.29 10*3/MM3 (ref 1.7–7)
NEUTROPHILS NFR BLD AUTO: 45.5 % (ref 42.7–76)
NRBC BLD AUTO-RTO: 0 /100 WBC (ref 0–0.2)
NT-PROBNP SERPL-MCNC: 355 PG/ML (ref 0–1800)
PLATELET # BLD AUTO: 146 10*3/MM3 (ref 140–450)
PMV BLD AUTO: 10.1 FL (ref 6–12)
POTASSIUM SERPL-SCNC: 4.5 MMOL/L (ref 3.5–5.2)
PROT SERPL-MCNC: 7 G/DL (ref 6–8.5)
RBC # BLD AUTO: 4.21 10*6/MM3 (ref 3.77–5.28)
SODIUM SERPL-SCNC: 143 MMOL/L (ref 136–145)
TSH SERPL DL<=0.05 MIU/L-ACNC: 2.36 UIU/ML (ref 0.27–4.2)
WBC NRBC COR # BLD AUTO: 9.46 10*3/MM3 (ref 3.4–10.8)

## 2023-12-29 PROCEDURE — 80053 COMPREHEN METABOLIC PANEL: CPT

## 2023-12-29 PROCEDURE — 1160F RVW MEDS BY RX/DR IN RCRD: CPT | Performed by: INTERNAL MEDICINE

## 2023-12-29 PROCEDURE — 99204 OFFICE O/P NEW MOD 45 MIN: CPT | Performed by: INTERNAL MEDICINE

## 2023-12-29 PROCEDURE — 93000 ELECTROCARDIOGRAM COMPLETE: CPT | Performed by: INTERNAL MEDICINE

## 2023-12-29 PROCEDURE — 3080F DIAST BP >= 90 MM HG: CPT | Performed by: INTERNAL MEDICINE

## 2023-12-29 PROCEDURE — 36415 COLL VENOUS BLD VENIPUNCTURE: CPT

## 2023-12-29 PROCEDURE — 83880 ASSAY OF NATRIURETIC PEPTIDE: CPT | Performed by: INTERNAL MEDICINE

## 2023-12-29 PROCEDURE — 85025 COMPLETE CBC W/AUTO DIFF WBC: CPT

## 2023-12-29 PROCEDURE — 3077F SYST BP >= 140 MM HG: CPT | Performed by: INTERNAL MEDICINE

## 2023-12-29 PROCEDURE — 1159F MED LIST DOCD IN RCRD: CPT | Performed by: INTERNAL MEDICINE

## 2023-12-29 PROCEDURE — 84443 ASSAY THYROID STIM HORMONE: CPT

## 2023-12-29 RX ORDER — VERAPAMIL HYDROCHLORIDE 240 MG/1
240 TABLET, FILM COATED, EXTENDED RELEASE ORAL NIGHTLY
Qty: 90 TABLET | Refills: 3 | Status: SHIPPED | OUTPATIENT
Start: 2023-12-29

## 2023-12-29 NOTE — TELEPHONE ENCOUNTER
Reviewed results with Tessie Mckeon and patient verbalized understanding of results.    Thank you,  Emy MCCARTY RN  Triage Nurse [facility]    14:56 EST

## 2023-12-29 NOTE — TELEPHONE ENCOUNTER
Please let her know that labs look good.  Kidney function is stable and electrolytes are normal, hemoglobin is normal, TSH is normal, and proBNP is normal.

## 2023-12-29 NOTE — PROGRESS NOTES
Date of Office Visit: 2023  Encounter Provider: Sandi Evans MD  Place of Service: [facility] CARDIOLOGY  Patient Name: Tessie Mckeon  :1940    Chief complaint  Consult requested by Dr. Roque for cardiology care and atrial fibrillation.    History of Present Illness  Patient is an 83-year-old female with hypertension, mild renal insufficiency (followed by Dr. Saucedo), early lymphoproliferative disorder/very early chronic lymphocytic leukemia follow-up by conservative observation for now.  She has known lymphadenopathy in her abdomen which is stable.    In 2023 while visiting [facility] she was seen for atrial fibrillation in the setting of upper respiratory infection.  She was started on Eliquis and told to continue Eliquis.  Unfortunately patient was not convinced she wanted to do this and has not started Eliquis especially as it was $500 a month.  She also notes that while she had atrial fibrillation in the urgent care by the time she got to the ER he was found to be in sinus rhythm.  She denies any palpitations syncope near syncope.  She has had fatigue ever since her upper respiratory infection and exertional dyspnea when she walks upstairs.  She her cough is slowly improving.  Blood pressure checked at home has been lower typically in the 140s.    Patient does not exercise.  She has had no chest pain palpitations.  She does have shortness of breath.    Past Medical History:   Diagnosis Date    Arthritis     Bacterial vaginitis     Bronchitis     Colon polyps     Diverticulosis     Dizziness     Elevated lipids     Fatigue     GERD (gastroesophageal reflux disease)     Headache     Hematuria     History of EKG     date?; borderline    Hypertension 2022    Hypovitaminosis D     Lump, breast     Unable to remember which breast    Macrocytosis     Oral candidiasis     Osteoporosis     Postmenopausal bleeding     Suprapubic tenderness      Vaginal atrophy     Yeast vaginitis      Past Surgical History:   Procedure Laterality Date    BREAST EXCISIONAL BIOPSY Left     12-15 years ago in Dr. Hendricks's office    BREAST SURGERY Left 2003    [facility]    CATARACT EXTRACTION  2009    COLONOSCOPY  10/23/2017    Dr Broderick    DILATATION AND CURETTAGE  09/21/2015    EYE SURGERY      FOOT SURGERY Right 1999    GUM SURGERY  1997    UPPER GASTROINTESTINAL ENDOSCOPY  02/01/2021    EULALIA HERNANDEZ MD     Outpatient Medications Prior to Visit   Medication Sig Dispense Refill    famotidine (PEPCID) 40 MG tablet Take 1 tablet by mouth Every Night. 90 tablet 0    sucralfate (CARAFATE) 1 g tablet TAKE 1 TABLET BY MOUTH FOUR TIMES A DAY BEFORE MEALS      verapamil SR (CALAN-SR) 180 MG CR tablet Take 1 tablet by mouth Every Night. 30 tablet 0    influenza vac split quad (FLUZONE,FLUARIX,AFLURIA,FLULAVAL) 0.5 ML suspension prefilled syringe injection ADM 0.5ML IM UTD (Patient not taking: Reported on 12/29/2023)       No facility-administered medications prior to visit.       Allergies as of 12/29/2023 - Reviewed 12/29/2023   Allergen Reaction Noted    Clindamycin/lincomycin Other (See Comments) 07/13/2017    Proton pump inhibitors Other (See Comments) 07/13/2017    Pneumovax [pneumococcal polysaccharide vaccine] Rash 12/21/2022     Social History     Socioeconomic History    Marital status:      Spouse name: Sina    Number of children: 1    Years of education: High School   Tobacco Use    Smoking status: Never     Passive exposure: Never    Smokeless tobacco: Never    Tobacco comments:     Caffeine: 1 coffee daily   Vaping Use    Vaping Use: Never used   Substance and Sexual Activity    Alcohol use: No    Drug use: No    Sexual activity: Defer     Family History   Problem Relation Age of Onset    Heart disease Mother     Heart attack Mother     Other Mother         GERD    No Known Problems Father     Breast cancer Sister         Diagnosed at age 90-91    No Known Problems  "Brother     No Known Problems Maternal Grandmother     No Known Problems Maternal Grandfather     No Known Problems Paternal Grandmother     No Known Problems Paternal Grandfather     No Known Problems Maternal Aunt     No Known Problems Maternal Uncle     No Known Problems Paternal Aunt     No Known Problems Paternal Uncle     Ovarian cancer Neg Hx      Review of Systems   Constitutional: Negative for chills, fever, weight gain and weight loss.   Cardiovascular:  Negative for leg swelling.   Respiratory:  Positive for cough. Negative for snoring and wheezing.    Hematologic/Lymphatic: Negative for bleeding problem. Does not bruise/bleed easily.   Skin:  Negative for color change.   Musculoskeletal:  Negative for falls, joint pain and myalgias.   Gastrointestinal:  Negative for melena.   Genitourinary:  Negative for hematuria.   Neurological:  Negative for excessive daytime sleepiness.   Psychiatric/Behavioral:  Negative for depression. The patient is not nervous/anxious.         Objective:     Vitals:    12/29/23 1025   BP: 166/90   Pulse: 77   Weight: 58.5 kg (129 lb)   Height: 152.4 cm (60\")     Body mass index is 25.19 kg/m².    Vitals reviewed.   Constitutional:       Appearance: Well-developed.   Eyes:      General: No scleral icterus.        Right eye: No discharge.      Conjunctiva/sclera: Conjunctivae normal.      Pupils: Pupils are equal, round, and reactive to light.   HENT:      Head: Normocephalic.      Nose: Nose normal.   Neck:      Thyroid: No thyromegaly.      Vascular: No JVD.   Pulmonary:      Effort: Pulmonary effort is normal. No respiratory distress.      Breath sounds: Normal breath sounds. No wheezing. No rales.   Cardiovascular:      Normal rate. Regular rhythm. Normal S1. Normal S2.       Murmurs: There is no murmur.      No gallop.    Pulses:     Intact distal pulses.      Carotid: 2+ bilaterally.     Radial: 2+ bilaterally.     Femoral: 2+ bilaterally.     Popliteal: 2+ bilaterally.     " Dorsalis pedis: 2+ bilaterally.     Posterior tibial: 2+ bilaterally.  Edema:     Peripheral edema absent.   Abdominal:      General: Bowel sounds are normal. There is no distension.      Palpations: Abdomen is soft.      Tenderness: There is no abdominal tenderness. There is no rebound.   Musculoskeletal: Normal range of motion.         General: No tenderness.      Cervical back: Normal range of motion and neck supple. Skin:     General: Skin is warm and dry.      Findings: No erythema or rash.   Neurological:      Mental Status: Alert and oriented to person, place, and time.   Psychiatric:         Behavior: Behavior normal.         Thought Content: Thought content normal.         Judgment: Judgment normal.       Lab Review:   Lab Results - Last 18 Months   Lab Units 12/29/23  1242 07/10/23  1015 03/06/23  1518 02/02/23  1304   WBC 10*3/mm3 9.46 5.95   < > 10.6   RBC 10*6/mm3 4.21 4.40   < > 4.13   HEMOGLOBIN g/dL 14.3 14.8   < > 14.2   HEMATOCRIT % 42.4 44.3   < > 41.3   MCV fL 100.7* 100.7*   < > 100*   MCH pg 34.0* 33.6*   < > 34.4*   MCHC g/dL 33.7 33.4   < > 34.4   RDW % 12.7 12.9   < > 12.4   PLATELETS 10*3/mm3 146 176   < > 187   NEUTROPHIL % % 45.5 37.4*   < > 62   LYMPHOCYTE % % 49.0* 52.8*   < > 30   MONOCYTES % % 4.3* 6.7   < > 8   EOSINOPHIL % % 0.7 2.2   < > 0   BASOPHIL % % 0.3 0.7   < > 0   NEUTROS ABS 10*3/mm3 4.29 2.23   < > 6.6   LYMPHS ABS 10*3/mm3 4.64* 3.14*   < > 3.1   MONOS ABS 10*3/mm3 0.41 0.40   < > 0.8   EOS ABS 10*3/mm3 0.07 0.13   < > 0.0   BASOS ABS 10*3/mm3 0.03 0.04   < > 0.0   IMM GRAN % %  --   --   --  0   IMMATURE GRANS (ABS) x10E3/uL  --   --   --  0.0   RDW-SD fl 46.4 48.3   < >  --    MPV fL 10.1 9.5   < >  --     < > = values in this interval not displayed.     Lab Results - Last 18 Months   Lab Units 12/29/23  1242 07/10/23  1015   GLUCOSE mg/dL 104* 97   BUN mg/dL 21 15   CREATININE mg/dL 1.01* 1.18*   SODIUM mmol/L 143 141   POTASSIUM mmol/L 4.5 4.5   CHLORIDE mmol/L 107  107   CO2 mmol/L 24.4 25.2   CALCIUM mg/dL 9.4 9.3   BUN / CREAT RATIO  20.8 12.7   ANION GAP mmol/L 11.6 8.8   EGFR mL/min/1.73 54.7* 45.6*     Lab Results - Last 18 Months   Lab Units 12/29/23  1242 07/10/23  1015   GLUCOSE mg/dL 104* 97   BUN mg/dL 21 15   CREATININE mg/dL 1.01* 1.18*   SODIUM mmol/L 143 141   POTASSIUM mmol/L 4.5 4.5   CHLORIDE mmol/L 107 107   CO2 mmol/L 24.4 25.2   CALCIUM mg/dL 9.4 9.3   TOTAL PROTEIN g/dL 7.0 6.5   ALBUMIN g/dL 4.2 3.6   ALT (SGPT) U/L 10 9   AST (SGOT) U/L 15 13   ALK PHOS U/L 67 52   BILIRUBIN mg/dL 0.4 0.9   GLOBULIN gm/dL 2.8 2.9   A/G RATIO g/dL 1.5 1.2   BUN / CREAT RATIO  20.8 12.7   ANION GAP mmol/L 11.6 8.8   EGFR mL/min/1.73 54.7* 45.6*     ECG 12 Lead    Date/Time: 12/29/2023 11:25 AM  Performed by: Sandi Evans MD    Authorized by: Sandi Evans MD  Comparison: compared with previous ECG   Rhythm: sinus rhythm    Clinical impression: normal ECG        Assessment:       Diagnosis Plan   1. Paroxysmal atrial fibrillation  ECG 12 Lead    Comprehensive Metabolic Panel    CBC & Differential    TSH    Adult Transthoracic Echo Complete W/ Cont if Necessary Per Protocol    Holter Monitor - 72 Hour Up To 15 Days      2. SOMMERS (dyspnea on exertion)  proBNP        Plan:       1.  Atrial fibrillation, paroxysmal.  Sinus infected.  She is at bedside this with tachycardic at urgent care and ER while vacationing.  She is quite hesitant to start any anticoagulation.  It is possible this may have been mediated.  Certainly she is in sinus now.  I reviewed findings with the patient and told her that she is at increased risk for embolic events if she does have recurrent atrial fibrillation but for now we will check labs, place a 2-week Zio patch and check an echocardiogram.  Will also increase verapamil to 40 mg a day.  2.  Hypertension.  As above.  May need to titrate meds further.  3.  Renal insufficiency.  Followed by Dr. Saucedo.  4.  Lymphoma, felt to be quiescent.  5.  Residual  dyspnea.  May be from recent upper respiratory infection.  Await echo results.  Will also check a proBNP.  If this does not continue to improve back to her baseline which is essentially no dyspnea, may need to consider further coronary evaluation.    Time Spent: I spent 60 minutes caring for Tessie on this date of service. This time includes time spent by me in the following activities: preparing for the visit, reviewing tests, obtaining and/or reviewing a separately obtained history, performing a medically appropriate examination and/or evaluation, counseling and educating the patient/family/caregiver, ordering medications, tests, or procedures, documenting information in the medical record, and independently interpreting results and communicating that information with the patient/family/caregiver.   I spent 1 minutes on the separately reported service of ECG. This time is not included in the time used to support the E/M service also reported today.        Your medication list            Accurate as of December 29, 2023 11:59 PM. If you have any questions, ask your nurse or doctor.                CHANGE how you take these medications        Instructions Last Dose Given Next Dose Due   verapamil  MG CR tablet  Commonly known as: CALAN-SR  What changed:   medication strength  how much to take  Changed by: Sandi Evans MD      Take 1 tablet by mouth Every Night.              CONTINUE taking these medications        Instructions Last Dose Given Next Dose Due   famotidine 40 MG tablet  Commonly known as: PEPCID      Take 1 tablet by mouth Every Night.       influenza vac split quad 0.5 ML suspension prefilled syringe injection  Commonly known as: FLUZONE,FLUARIX,AFLURIA,FLULAVAL      ADM 0.5ML IM UTD       sucralfate 1 g tablet  Commonly known as: CARAFATE      TAKE 1 TABLET BY MOUTH FOUR TIMES A DAY BEFORE MEALS                 Where to Get Your Medications        These medications were sent to [facility]  [id number] - [place], [place] - [address] - [phone] PH - [phone] FX  [address] [address]      Phone: [phone]   verapamil  MG CR tablet         Patient is no longer taking -.  I corrected the med list to reflect this.  I did not stop these medications.      Dictated utilizing Dragon dictation

## 2024-01-02 ENCOUNTER — TELEPHONE (OUTPATIENT)
Dept: ONCOLOGY | Facility: CLINIC | Age: 84
End: 2024-01-02
Payer: MEDICARE

## 2024-01-02 NOTE — TELEPHONE ENCOUNTER
Caller: Tessie Mckeon    Relationship to patient: Self    Best call back number: [phone]    Chief complaint: RESCHEDULE     Type of visit: CT SCAN VITALS, AND FOLLOW UP     Requested date: AFTER 1-12     If rescheduling, when is the original appointment: 1-5 AND  1-12    Additional notes:CENTRAL SCHEDULING TOLD PATIENT OFFICE WOULD HAVE TO RESCHEDULE CT APPOINTMENT          PLEASE ADVISE

## 2024-01-17 ENCOUNTER — HOSPITAL ENCOUNTER (OUTPATIENT)
Dept: CARDIOLOGY | Facility: HOSPITAL | Age: 84
Discharge: HOME | End: 2024-01-17
Admitting: INTERNAL MEDICINE
Payer: MEDICARE

## 2024-01-17 ENCOUNTER — TELEPHONE (OUTPATIENT)
Dept: CARDIOLOGY | Facility: CLINIC | Age: 84
End: 2024-01-17
Payer: MEDICARE

## 2024-01-17 VITALS
BODY MASS INDEX: 25.32 KG/M2 | DIASTOLIC BLOOD PRESSURE: 98 MMHG | OXYGEN SATURATION: 100 % | HEART RATE: 69 BPM | WEIGHT: 129 LBS | SYSTOLIC BLOOD PRESSURE: 168 MMHG | HEIGHT: 60 IN

## 2024-01-17 DIAGNOSIS — I48.0 PAROXYSMAL ATRIAL FIBRILLATION: ICD-10-CM

## 2024-01-17 LAB
AORTIC ARCH: 2.2 CM
ASCENDING AORTA: 2.5 CM
BH CV ECHO MEAS - ACS: 1.69 CM
BH CV ECHO MEAS - AO MAX PG: 11.2 MMHG
BH CV ECHO MEAS - AO MEAN PG: 6 MMHG
BH CV ECHO MEAS - AO ROOT DIAM: 2.6 CM
BH CV ECHO MEAS - AO V2 MAX: 167 CM/SEC
BH CV ECHO MEAS - AO V2 VTI: 34.7 CM
BH CV ECHO MEAS - AVA(I,D): 1.37 CM2
BH CV ECHO MEAS - EDV(CUBED): 50.7 ML
BH CV ECHO MEAS - EDV(MOD-SP2): 50.8 ML
BH CV ECHO MEAS - EDV(MOD-SP4): 51 ML
BH CV ECHO MEAS - EF(MOD-BP): 62 %
BH CV ECHO MEAS - EF(MOD-SP2): 61.7 %
BH CV ECHO MEAS - ESV(CUBED): 12.3 ML
BH CV ECHO MEAS - ESV(MOD-SP2): 19.5 ML
BH CV ECHO MEAS - FS: 37.6 %
BH CV ECHO MEAS - IVS/LVPW: 1 CM
BH CV ECHO MEAS - IVSD: 0.7 CM
BH CV ECHO MEAS - LAT PEAK E' VEL: 8.6 CM/SEC
BH CV ECHO MEAS - LV DIASTOLIC VOL/BSA (35-75): 32.9 CM2
BH CV ECHO MEAS - LV MASS(C)D: 68.8 GRAMS
BH CV ECHO MEAS - LV MAX PG: 4.1 MMHG
BH CV ECHO MEAS - LV MEAN PG: 2 MMHG
BH CV ECHO MEAS - LV V1 MAX: 101 CM/SEC
BH CV ECHO MEAS - LV V1 VTI: 21.1 CM
BH CV ECHO MEAS - LVIDD: 3.7 CM
BH CV ECHO MEAS - LVIDS: 2.31 CM
BH CV ECHO MEAS - LVOT AREA: 2.25 CM2
BH CV ECHO MEAS - LVOT DIAM: 1.69 CM
BH CV ECHO MEAS - LVPWD: 0.7 CM
BH CV ECHO MEAS - MED PEAK E' VEL: 8.4 CM/SEC
BH CV ECHO MEAS - MR MAX PG: 66.3 MMHG
BH CV ECHO MEAS - MR MAX VEL: 407.2 CM/SEC
BH CV ECHO MEAS - MV A DUR: 0.14 SEC
BH CV ECHO MEAS - MV A MAX VEL: 110 CM/SEC
BH CV ECHO MEAS - MV DEC TIME: 0.18 SEC
BH CV ECHO MEAS - MV E MAX VEL: 74.1 CM/SEC
BH CV ECHO MEAS - MV E/A: 0.67
BH CV ECHO MEAS - MV MAX PG: 4.4 MMHG
BH CV ECHO MEAS - MV MEAN PG: 1.37 MMHG
BH CV ECHO MEAS - MV V2 VTI: 26.1 CM
BH CV ECHO MEAS - MVA(VTI): 1.82 CM2
BH CV ECHO MEAS - PA ACC TIME: 0.13 SEC
BH CV ECHO MEAS - PA V2 MAX: 96.6 CM/SEC
BH CV ECHO MEAS - PULM A REVS DUR: 0.1 SEC
BH CV ECHO MEAS - PULM A REVS VEL: 54.8 CM/SEC
BH CV ECHO MEAS - PULM DIAS VEL: 47.6 CM/SEC
BH CV ECHO MEAS - PULM S/D: 1.46
BH CV ECHO MEAS - PULM SYS VEL: 69.4 CM/SEC
BH CV ECHO MEAS - QP/QS: 1.14
BH CV ECHO MEAS - RAP SYSTOLE: 3 MMHG
BH CV ECHO MEAS - RV MAX PG: 3.1 MMHG
BH CV ECHO MEAS - RV V1 MAX: 87.8 CM/SEC
BH CV ECHO MEAS - RV V1 VTI: 19.4 CM
BH CV ECHO MEAS - RVOT DIAM: 1.88 CM
BH CV ECHO MEAS - RVSP: 32 MMHG
BH CV ECHO MEAS - SI(MOD-SP2): 20.2 ML/M2
BH CV ECHO MEAS - SUP REN AO DIAM: 1.8 CM
BH CV ECHO MEAS - SV(LVOT): 47.6 ML
BH CV ECHO MEAS - SV(MOD-SP2): 31.3 ML
BH CV ECHO MEAS - SV(RVOT): 54 ML
BH CV ECHO MEAS - TR MAX PG: 29.2 MMHG
BH CV ECHO MEAS - TR MAX VEL: 270.1 CM/SEC
BH CV ECHO MEASUREMENTS AVERAGE E/E' RATIO: 8.72
BH CV XLRA - RV BASE: 2.36 CM
BH CV XLRA - RV LENGTH: 5.6 CM
BH CV XLRA - RV MID: 1.8 CM
BH CV XLRA - TDI S': 13.5 CM/SEC
LEFT ATRIUM VOLUME INDEX: 26.6 ML/M2
SINUS: 2.35 CM
STJ: 1.87 CM

## 2024-01-17 PROCEDURE — 93306 TTE W/DOPPLER COMPLETE: CPT

## 2024-01-17 NOTE — TELEPHONE ENCOUNTER
Please let her know that echo looks good.  Her heart is strong and functioning well.  Would not make any changes to medications based on this.  Will await Holter monitor results and report those when they are available.

## 2024-01-18 NOTE — TELEPHONE ENCOUNTER
I spoke with Tessie Mckeon and updated pt on results/recommendations from provider.  Pt verbalized understanding and has no further questions at this time.    Thank you,    Annabel PICKETT, RN  Triage [facility]  01/18/24 08:39 EST

## 2024-01-24 ENCOUNTER — HOSPITAL ENCOUNTER (OUTPATIENT)
Dept: CT IMAGING | Facility: HOSPITAL | Age: 84
Discharge: HOME | End: 2024-01-24
Payer: MEDICARE

## 2024-01-24 ENCOUNTER — LAB (OUTPATIENT)
Dept: LAB | Facility: HOSPITAL | Age: 84
End: 2024-01-24
Payer: MEDICARE

## 2024-01-24 DIAGNOSIS — D72.820 MONOCLONAL B-CELL LYMPHOCYTOSIS: ICD-10-CM

## 2024-01-24 LAB
ALBUMIN SERPL-MCNC: 4.3 G/DL (ref 3.5–5.2)
ALBUMIN/GLOB SERPL: 1.5 G/DL
ALP SERPL-CCNC: 54 U/L (ref 39–117)
ALT SERPL W P-5'-P-CCNC: 10 U/L (ref 1–33)
ANION GAP SERPL CALCULATED.3IONS-SCNC: 9 MMOL/L (ref 5–15)
AST SERPL-CCNC: 15 U/L (ref 1–32)
BASOPHILS # BLD AUTO: 0.03 10*3/MM3 (ref 0–0.2)
BASOPHILS NFR BLD AUTO: 0.4 % (ref 0–1.5)
BILIRUB SERPL-MCNC: 0.6 MG/DL (ref 0–1.2)
BUN SERPL-MCNC: 22 MG/DL (ref 8–23)
BUN/CREAT SERPL: 18.3 (ref 7–25)
CALCIUM SPEC-SCNC: 10 MG/DL (ref 8.6–10.5)
CHLORIDE SERPL-SCNC: 106 MMOL/L (ref 98–107)
CO2 SERPL-SCNC: 26 MMOL/L (ref 22–29)
CREAT SERPL-MCNC: 1.2 MG/DL (ref 0.57–1)
DEPRECATED RDW RBC AUTO: 49.1 FL (ref 37–54)
EGFRCR SERPLBLD CKD-EPI 2021: 44.5 ML/MIN/1.73
EOSINOPHIL # BLD AUTO: 0.07 10*3/MM3 (ref 0–0.4)
EOSINOPHIL NFR BLD AUTO: 0.9 % (ref 0.3–6.2)
ERYTHROCYTE [DISTWIDTH] IN BLOOD BY AUTOMATED COUNT: 12.9 % (ref 12.3–15.4)
GLOBULIN UR ELPH-MCNC: 2.8 GM/DL
GLUCOSE SERPL-MCNC: 109 MG/DL (ref 65–99)
HCT VFR BLD AUTO: 45.2 % (ref 34–46.6)
HGB BLD-MCNC: 15 G/DL (ref 12–15.9)
IMM GRANULOCYTES # BLD AUTO: 0.02 10*3/MM3 (ref 0–0.05)
IMM GRANULOCYTES NFR BLD AUTO: 0.2 % (ref 0–0.5)
LYMPHOCYTES # BLD AUTO: 4.05 10*3/MM3 (ref 0.7–3.1)
LYMPHOCYTES NFR BLD AUTO: 49.9 % (ref 19.6–45.3)
MCH RBC QN AUTO: 33.9 PG (ref 26.6–33)
MCHC RBC AUTO-ENTMCNC: 33.2 G/DL (ref 31.5–35.7)
MCV RBC AUTO: 102.3 FL (ref 79–97)
MONOCYTES # BLD AUTO: 0.68 10*3/MM3 (ref 0.1–0.9)
MONOCYTES NFR BLD AUTO: 8.4 % (ref 5–12)
NEUTROPHILS NFR BLD AUTO: 3.27 10*3/MM3 (ref 1.7–7)
NEUTROPHILS NFR BLD AUTO: 40.2 % (ref 42.7–76)
NRBC BLD AUTO-RTO: 0 /100 WBC (ref 0–0.2)
PLAT MORPH BLD: NORMAL
PLATELET # BLD AUTO: 155 10*3/MM3 (ref 140–450)
PMV BLD AUTO: 10.3 FL (ref 6–12)
POTASSIUM SERPL-SCNC: 4.4 MMOL/L (ref 3.5–5.2)
PROT SERPL-MCNC: 7.1 G/DL (ref 6–8.5)
RBC # BLD AUTO: 4.42 10*6/MM3 (ref 3.77–5.28)
RBC MORPH BLD: NORMAL
SODIUM SERPL-SCNC: 141 MMOL/L (ref 136–145)
WBC MORPH BLD: NORMAL
WBC NRBC COR # BLD AUTO: 8.12 10*3/MM3 (ref 3.4–10.8)

## 2024-01-24 PROCEDURE — 71260 CT THORAX DX C+: CPT

## 2024-01-24 PROCEDURE — 85025 COMPLETE CBC W/AUTO DIFF WBC: CPT

## 2024-01-24 PROCEDURE — 86334 IMMUNOFIX E-PHORESIS SERUM: CPT | Performed by: INTERNAL MEDICINE

## 2024-01-24 PROCEDURE — 25510000001 IOPAMIDOL 61 % SOLUTION: Performed by: INTERNAL MEDICINE

## 2024-01-24 PROCEDURE — 85007 BL SMEAR W/DIFF WBC COUNT: CPT

## 2024-01-24 PROCEDURE — 80053 COMPREHEN METABOLIC PANEL: CPT | Performed by: INTERNAL MEDICINE

## 2024-01-24 PROCEDURE — 83521 IG LIGHT CHAINS FREE EACH: CPT | Performed by: INTERNAL MEDICINE

## 2024-01-24 PROCEDURE — 0 DIATRIZOATE MEGLUMINE & SODIUM PER 1 ML: Performed by: INTERNAL MEDICINE

## 2024-01-24 PROCEDURE — 84165 PROTEIN E-PHORESIS SERUM: CPT | Performed by: INTERNAL MEDICINE

## 2024-01-24 PROCEDURE — 74177 CT ABD & PELVIS W/CONTRAST: CPT

## 2024-01-24 PROCEDURE — 36415 COLL VENOUS BLD VENIPUNCTURE: CPT

## 2024-01-24 PROCEDURE — 82565 ASSAY OF CREATININE: CPT

## 2024-01-24 PROCEDURE — 82784 ASSAY IGA/IGD/IGG/IGM EACH: CPT | Performed by: INTERNAL MEDICINE

## 2024-01-24 RX ADMIN — IOPAMIDOL 90 ML: 612 INJECTION, SOLUTION INTRAVENOUS at 10:41

## 2024-01-24 RX ADMIN — DIATRIZOATE MEGLUMINE AND DIATRIZOATE SODIUM 30 ML: 660; 100 LIQUID ORAL; RECTAL at 08:50

## 2024-01-24 NOTE — PROGRESS NOTES
Date of Office Visit: 2024  Encounter Provider: JAZZY Jc  Place of Service: [facility] CARDIOLOGY  Patient Name: Tessie Mckeon  :1940    Chief complaint  Atrial fibrillation, cardio-oncology care    History of Present Illness  Patient is a 83 y.o. year old female  patient of Dr. Evans. Past medical history includes hypertension, mild renal insufficiency (followed by Dr. Clark), early lymphoproliferative disorder/very early chronic lymphocytic leukemia follow-up by conservative observation for now.  She has known lymphadenopathy in her abdomen which is stable.     In 2023 while visiting [facility] she was seen for atrial fibrillation in the setting of upper respiratory infection.  She was started on Eliquis and told to continue Eliquis.  Unfortunately patient was not convinced she wanted to do this and did not start Eliquis especially as it was $500 a month.  She was seen in office in follow-up and had echocardiogram that showed LVEF 65%, normal diastolic function, no evidence of significant valvular disease, normal right-sided pressures and negative saline study.  2 weeks Zio patch showed several runs of nonspecific atrial tachycardia and atrial fibrillation at less than 1% of total monitored time with the longest episode lasting 25 minutes.    Interval history  Patient presents today for routine follow-up.  I will visit with her for the first time today and have reviewed her medical record.  Since last visit she feels fatigue is improved.  She denies palpitations, shortness of breath, edema, chest pain or chest pressure, dizziness, syncope or presyncope.  She is slowly regaining her strength after upper respiratory infection.  Blood pressure today is slightly elevated though she reports readings at home are usually 120-130/80.    Past Medical History:   Diagnosis Date    Arthritis     Bacterial vaginitis     Bronchitis     Colon polyps      Diverticulosis     Dizziness     Elevated lipids     Fatigue     GERD (gastroesophageal reflux disease)     Headache     Hematuria     History of EKG     date?; borderline    Hypertension 12/18/2022    Hypovitaminosis D     Lump, breast     Unable to remember which breast    Macrocytosis     Oral candidiasis     Osteoporosis     Postmenopausal bleeding     Suprapubic tenderness     Vaginal atrophy     Yeast vaginitis      Past Surgical History:   Procedure Laterality Date    BREAST EXCISIONAL BIOPSY Left     12-15 years ago in Dr. Hendricks's office    BREAST SURGERY Left 2003    [facility]    CATARACT EXTRACTION  2009    COLONOSCOPY  10/23/2017    Dr Broderick    DILATATION AND CURETTAGE  09/21/2015    EYE SURGERY      FOOT SURGERY Right 1999    GUM SURGERY  1997    UPPER GASTROINTESTINAL ENDOSCOPY  02/01/2021    EULALIA HERNANDEZ MD     Outpatient Medications Prior to Visit   Medication Sig Dispense Refill    famotidine (PEPCID) 40 MG tablet Take 1 tablet by mouth Every Night. 90 tablet 0    sucralfate (CARAFATE) 1 g tablet TAKE 1 TABLET BY MOUTH FOUR TIMES A DAY BEFORE MEALS      verapamil SR (CALAN-SR) 240 MG CR tablet Take 1 tablet by mouth Every Night. 90 tablet 3    influenza vac split quad (FLUZONE,FLUARIX,AFLURIA,FLULAVAL) 0.5 ML suspension prefilled syringe injection        No facility-administered medications prior to visit.       Allergies as of 01/26/2024 - Reviewed 01/26/2024   Allergen Reaction Noted    Clindamycin/lincomycin Other (See Comments) 07/13/2017    Proton pump inhibitors Other (See Comments) 07/13/2017    Pneumovax [pneumococcal polysaccharide vaccine] Rash 12/21/2022     Social History     Socioeconomic History    Marital status:      Spouse name: Sina    Number of children: 1    Years of education: High School   Tobacco Use    Smoking status: Never     Passive exposure: Never    Smokeless tobacco: Never    Tobacco comments:     Caffeine: 1 coffee daily   Vaping Use    Vaping Use: Never used  "  Substance and Sexual Activity    Alcohol use: No    Drug use: No    Sexual activity: Defer     Family History   Problem Relation Age of Onset    Heart disease Mother     Heart attack Mother     Other Mother         GERD    No Known Problems Father     Breast cancer Sister         Diagnosed at age 90-91    No Known Problems Brother     No Known Problems Maternal Grandmother     No Known Problems Maternal Grandfather     No Known Problems Paternal Grandmother     No Known Problems Paternal Grandfather     No Known Problems Maternal Aunt     No Known Problems Maternal Uncle     No Known Problems Paternal Aunt     No Known Problems Paternal Uncle     Ovarian cancer Neg Hx      Review of Systems   Constitutional: Positive for malaise/fatigue.   Cardiovascular:  Negative for chest pain, claudication, dyspnea on exertion, leg swelling, near-syncope, orthopnea, palpitations, paroxysmal nocturnal dyspnea and syncope.   Respiratory:  Negative for shortness of breath.    Neurological:  Negative for brief paralysis, dizziness, headaches and light-headedness.   All other systems reviewed and are negative.       Objective:     Vitals:    01/26/24 1247   BP: 138/94   BP Location: Left arm   Patient Position: Sitting   Cuff Size: Small Adult   Pulse: 70   Weight: 57.9 kg (127 lb 9.6 oz)   Height: 152.4 cm (60\")     Body mass index is 24.92 kg/m².    Vitals reviewed.   Constitutional:       General: Not in acute distress.     Appearance: Well-developed and not in distress. Frail. Not diaphoretic.   HENT:      Head: Normocephalic.   Pulmonary:      Effort: Pulmonary effort is normal. No respiratory distress.      Breath sounds: Normal breath sounds. No wheezing. No rhonchi. No rales.   Cardiovascular:      Normal rate. Regular rhythm.      Murmurs: There is no murmur.   Pulses:     Radial: 2+ bilaterally.  Edema:     Peripheral edema absent.   Skin:     General: Skin is warm and dry. There is no cyanosis.      Findings: No rash. " "  Neurological:      Mental Status: Alert and oriented to person, place, and time.   Psychiatric:         Behavior: Behavior normal. Behavior is cooperative.         Thought Content: Thought content normal.         Judgment: Judgment normal.       Lab Review:     Lab Results   Component Value Date     01/24/2024     12/29/2023    K 4.4 01/24/2024    K 4.5 12/29/2023     01/24/2024     12/29/2023    CO2 26.0 01/24/2024    CO2 24.4 12/29/2023    BUN 22 01/24/2024    BUN 21 12/29/2023    CREATININE 1.20 01/24/2024    CREATININE 1.20 (H) 01/24/2024    EGFRIFNONA 43 (L) 09/28/2021    EGFRIFNONA 47 (L) 09/29/2020    GLUCOSE 109 (H) 01/24/2024    GLUCOSE 104 (H) 12/29/2023    CALCIUM 10.0 01/24/2024    CALCIUM 9.4 12/29/2023    PROTENTOTREF 6.7 01/24/2024    ALBUMIN 4.3 01/24/2024    ALBUMIN 3.9 01/24/2024    BILITOT 0.6 01/24/2024    BILITOT 0.4 12/29/2023    AST 15 01/24/2024    AST 15 12/29/2023    ALT 10 01/24/2024    ALT 10 12/29/2023     Lab Results   Component Value Date    WBC 8.12 01/24/2024    WBC 9.46 12/29/2023    HGB 15.0 01/24/2024    HGB 14.3 12/29/2023    HCT 45.2 01/24/2024    HCT 42.4 12/29/2023    .3 (H) 01/24/2024    .7 (H) 12/29/2023     01/24/2024     12/29/2023     Lab Results   Component Value Date    PROBNP 355.0 12/29/2023     No results found for: \"CKTOTAL\", \"CKMB\", \"CKMBINDEX\", \"TROPONINI\", \"TROPONINT\"  Lab Results   Component Value Date    TSH 2.360 12/29/2023    TSH 1.380 06/10/2016             ECG 12 Lead    Date/Time: 1/26/2024 2:13 PM  Performed by: Charissa Michele APRN    Authorized by: Charissa Michele APRN  Comparison: compared with previous ECG   Similar to previous ECG  Rhythm: sinus rhythm  Rate: normal  BPM: 70  QRS axis: normal  Other findings: low voltage  Comments: Similar to prior        Assessment:       Diagnosis Plan   1. Paroxysmal atrial fibrillation        2. Primary hypertension        3. Renal insufficiency      "   4. Monoclonal B-cell lymphocytosis        5. SOMMERS (dyspnea on exertion)          Plan:       1.  Atrial fibrillation, paroxysmal.  Zio patch did show around 1% atrial fibrillation for total monitored time.  Her [id number] score is 4 based on age, gender, hypertension.  Discussed risk of stroke with her and also risks and benefits of chronic anticoagulation therapy.  She is agreeable to proceed with anticoagulation.  She would prefer a once daily medication, and with GFR 44, will start xarelto 15 mg daily.  Heart rate overall controlled on current verapamil dose.  She will follow-up with Dr. Evans in 3 months for reevaluation.  2.  Hypertension.  Blood pressure at home overall controlled.  She will continue to monitor and call if consistently greater than 130/80.  3.  Renal insufficiency.  Followed by Dr. Clark and overall stable  4.  Lymphoma, felt to be quiescent.  5.  Residual dyspnea.  May be from recent upper respiratory infection.  Much improved and back to baseline.  Echo with normal LVEF and wall motion.  proBNP negative.    Time Spent: I spent 30 minutes caring for Tessie on this date of service. This time includes time spent by me in the following activities: preparing for the visit, reviewing tests, performing a medically appropriate examination and/or evaluations, counseling and educating the patient/family/caregiver, ordering medications, tests, or procedures, documenting information in the medical record, and independently interpreting results and communicating that information with the patient/family/caregiver.   I spent 1 minutes on the separately reported service of ECG. This time is not included in the time used to support the E/M service also reported today.        Your medication list            Accurate as of January 26, 2024  2:29 PM. If you have any questions, ask your nurse or doctor.                START taking these medications        Instructions Last Dose Given Next Dose Due    rivaroxaban 15 MG tablet  Commonly known as: XARELTO  Started by: JAZZY Jc      Take 1 tablet by mouth Daily.              CONTINUE taking these medications        Instructions Last Dose Given Next Dose Due   famotidine 40 MG tablet  Commonly known as: PEPCID      Take 1 tablet by mouth Every Night.       sucralfate 1 g tablet  Commonly known as: CARAFATE      TAKE 1 TABLET BY MOUTH FOUR TIMES A DAY BEFORE MEALS       verapamil  MG CR tablet  Commonly known as: CALAN-SR      Take 1 tablet by mouth Every Night.                 Where to Get Your Medications        These medications were sent to [id number] - [place], [place] - [address] - [phone]  - [phone] FX  [address] [address]      Phone: [phone]   rivaroxaban 15 MG tablet         Patient is no longer taking -.  I corrected the med list to reflect this.  I did not stop these medications.    Return in about 3 months (around 4/26/2024) for with Dr. Evans.      Dictated utilizing Dragon dictation

## 2024-01-25 LAB
ALBUMIN SERPL ELPH-MCNC: 3.9 G/DL (ref 2.9–4.4)
ALBUMIN/GLOB SERPL: 1.4 {RATIO} (ref 0.7–1.7)
ALPHA1 GLOB SERPL ELPH-MCNC: 0.3 G/DL (ref 0–0.4)
ALPHA2 GLOB SERPL ELPH-MCNC: 0.8 G/DL (ref 0.4–1)
B-GLOBULIN SERPL ELPH-MCNC: 1 G/DL (ref 0.7–1.3)
CREAT BLDA-MCNC: 1.2 MG/DL (ref 0.6–1.3)
GAMMA GLOB SERPL ELPH-MCNC: 0.8 G/DL (ref 0.4–1.8)
GLOBULIN SER-MCNC: 2.8 G/DL (ref 2.2–3.9)
IGA SERPL-MCNC: 131 MG/DL (ref 64–422)
IGG SERPL-MCNC: 799 MG/DL (ref 586–1602)
IGM SERPL-MCNC: 16 MG/DL (ref 26–217)
INTERPRETATION SERPL IEP-IMP: ABNORMAL
KAPPA LC FREE SER-MCNC: 18.5 MG/L (ref 3.3–19.4)
KAPPA LC FREE/LAMBDA FREE SER: 0.32 {RATIO} (ref 0.26–1.65)
LABORATORY COMMENT REPORT: ABNORMAL
LAMBDA LC FREE SERPL-MCNC: 57.2 MG/L (ref 5.7–26.3)
M PROTEIN SERPL ELPH-MCNC: ABNORMAL G/DL
PROT SERPL-MCNC: 6.7 G/DL (ref 6–8.5)

## 2024-01-26 ENCOUNTER — OFFICE VISIT (OUTPATIENT)
Dept: CARDIOLOGY | Facility: CLINIC | Age: 84
End: 2024-01-26
Payer: MEDICARE

## 2024-01-26 VITALS
HEART RATE: 70 BPM | WEIGHT: 127.6 LBS | DIASTOLIC BLOOD PRESSURE: 94 MMHG | BODY MASS INDEX: 25.05 KG/M2 | HEIGHT: 60 IN | SYSTOLIC BLOOD PRESSURE: 138 MMHG

## 2024-01-26 DIAGNOSIS — N28.9 RENAL INSUFFICIENCY: ICD-10-CM

## 2024-01-26 DIAGNOSIS — R06.09 DOE (DYSPNEA ON EXERTION): ICD-10-CM

## 2024-01-26 DIAGNOSIS — I48.0 PAROXYSMAL ATRIAL FIBRILLATION: Primary | ICD-10-CM

## 2024-01-26 DIAGNOSIS — I10 PRIMARY HYPERTENSION: ICD-10-CM

## 2024-01-26 DIAGNOSIS — D72.820 MONOCLONAL B-CELL LYMPHOCYTOSIS: ICD-10-CM

## 2024-01-26 PROCEDURE — 3075F SYST BP GE 130 - 139MM HG: CPT | Performed by: NURSE PRACTITIONER

## 2024-01-26 PROCEDURE — 93000 ELECTROCARDIOGRAM COMPLETE: CPT | Performed by: NURSE PRACTITIONER

## 2024-01-26 PROCEDURE — 1160F RVW MEDS BY RX/DR IN RCRD: CPT | Performed by: NURSE PRACTITIONER

## 2024-01-26 PROCEDURE — 1159F MED LIST DOCD IN RCRD: CPT | Performed by: NURSE PRACTITIONER

## 2024-01-26 PROCEDURE — 3080F DIAST BP >= 90 MM HG: CPT | Performed by: NURSE PRACTITIONER

## 2024-01-26 PROCEDURE — 99214 OFFICE O/P EST MOD 30 MIN: CPT | Performed by: NURSE PRACTITIONER

## 2024-01-29 ENCOUNTER — TELEPHONE (OUTPATIENT)
Dept: CARDIOLOGY | Facility: CLINIC | Age: 84
End: 2024-01-29
Payer: MEDICARE

## 2024-01-29 NOTE — TELEPHONE ENCOUNTER
Caller: Tessie Mckeon    Relationship: Self    Best call back number: [phone]    Which medication are you concerned about: YAMILA    Who prescribed you this medication: HEIDI WHITE APRN    When did you start taking this medication: 1/26/24    What are your concerns: PATIENT WOULD LIKE INFORMATION ON THE MEDICATION PROGRAM. HER PRESCRIPTION IS OVER $560 A MONTH. PLEASE REACH OUT TO PATIENT FOR CONSULTATION AND QUESTIONS ABOUT THE MEDICATION ASSISTANCE PROGRAM.    PATIENT WANTS TO KNOW IF SHE WILL NEED AN ANABIOTIC TO HAVE HER TEETH CLEAN. PLEASE REACH OUT TO PATIENT FOR CONSULTATION.    How long have you had these concerns: NA

## 2024-01-29 NOTE — TELEPHONE ENCOUNTER
Spoke with pt.  No other questions.  Verbalized understanding.     Pt mentioned that she is going to see the dentist in 2 weeks about teeth randomly bleeding in the morning but it stops.  No prolonged bleeding and no nose bleeding.  She will keep us informed on what the dentist says.  (Done)

## 2024-01-29 NOTE — TELEPHONE ENCOUNTER
Asked pt to call insurance as plans have a prescription deducible this year and this might be partly because of it.  I have given the number to Carlos Floyd to see what they can help to pt with.  They do not have downloadable forms anymore on there website.      I will find out with dental abx when Charissa returns.  Ok with pt.    Charissa: Please advise if abx are needed for a dental cleaning.

## 2024-01-30 ENCOUNTER — OFFICE VISIT (OUTPATIENT)
Dept: ONCOLOGY | Facility: CLINIC | Age: 84
End: 2024-01-30
Payer: MEDICARE

## 2024-01-30 VITALS
OXYGEN SATURATION: 97 % | HEART RATE: 76 BPM | RESPIRATION RATE: 16 BRPM | BODY MASS INDEX: 25.25 KG/M2 | WEIGHT: 128.6 LBS | TEMPERATURE: 98 F | SYSTOLIC BLOOD PRESSURE: 161 MMHG | HEIGHT: 60 IN | DIASTOLIC BLOOD PRESSURE: 83 MMHG

## 2024-01-30 DIAGNOSIS — C83.00 SMALL LYMPHOCYTIC LYMPHOMA: ICD-10-CM

## 2024-01-30 DIAGNOSIS — D72.820 MONOCLONAL B-CELL LYMPHOCYTOSIS: Primary | ICD-10-CM

## 2024-01-30 NOTE — PROGRESS NOTES
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                             HISTORY OF PRESENT ILLNESS:  The patient is a 83 y.o. 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  She is not on any treatment and seems to be doing well.  She was seeing us on an annual basis but she had recently developed some abdominal discomfort and low-grade fever and as a result of this her primary care physician Dr. Penaloza ordered a CT scan of the abdomen pelvis.  She was noted to have diverticulosis but no obvious diverticulitis.  She also had retroperitoneal and pelvic lymphadenopathy but no bulky nodes with the largest nodes being 2.5 cm in greatest diameter.    She feels fine in the office today.  She has not noted any peripheral lymphadenopathy.    Her blood counts today look good with a hemoglobin of 14.9.  Her white count is normal at 6260 with 50% neutrophils and 42% lymphocytes.  Her platelet count is normal also at 169,000.    I reassured her at this point that even though these lymph nodes may be pathologic and may represent a small lymphocytic lymphoma, she certainly does not require treatment at this time with no bulky nodes and no significant cytopenias.    INTERVAL HISTORY:  We have been observing her without any treatment and since her last office visit in July she did undergo axillary lymph node biopsies confirming small lymphocytic lymphoma with findings of deletion and TP53 and 13 q. deletion on molecular studies.    We arranged for her to undergo follow-up CT scans which were performed on 1/24/2024 with results as noted below showing some increase in her lymphadenopathy.  Largest lymph nodes are now a little over 3 cm in size.    Her CBC performed on the same day as her scans looks good with a total white count of 8000 and a normal hemoglobin of 15 g/dL.   Platelets are normal at 155,000.  Her white cell differential does show mild lymphocytosis with 50% lymphocytes and 40% neutrophils.    Since her last visit she also was diagnosed with atrial fibrillation and is now seeing cardiology and has been started on Xarelto anticoagulation 15 mg p.o. daily.   History of Present Illness     Past Medical History:   Diagnosis Date    Arthritis     Bacterial vaginitis     Bronchitis     Colon polyps     Diverticulosis     Dizziness     Elevated lipids     Fatigue     GERD (gastroesophageal reflux disease)     Headache     Hematuria     History of EKG     date?; borderline    Hypertension 12/18/2022    Hypovitaminosis D     Lump, breast     Unable to remember which breast    Macrocytosis     Oral candidiasis     Osteoporosis     Postmenopausal bleeding     Suprapubic tenderness     Vaginal atrophy     Yeast vaginitis         Past Surgical History:   Procedure Laterality Date    BREAST EXCISIONAL BIOPSY Left     12-15 years ago in Dr. Hendricks's office    BREAST SURGERY Left 2003    [facility]    CATARACT EXTRACTION  2009    COLONOSCOPY  10/23/2017    Dr Broderick    DILATATION AND CURETTAGE  09/21/2015    EYE SURGERY      FOOT SURGERY Right 1999    GUM SURGERY  1997    UPPER GASTROINTESTINAL ENDOSCOPY  02/01/2021    EULALIA HERNANDEZ MD        ALLERGIES:    Allergies   Allergen Reactions    Clindamycin/Lincomycin Other (See Comments)     Joint pain      Proton Pump Inhibitors Other (See Comments)     Joint pain      Pneumovax [Pneumococcal Polysaccharide Vaccine] Rash        Review of Systems   Constitutional:  Positive for fatigue. Negative for activity change, appetite change, fever and unexpected weight change.   HENT:  Negative for hearing loss, nosebleeds, trouble swallowing and voice change.    Eyes:  Negative for visual disturbance.   Respiratory:  Positive for cough. Negative for shortness of breath and wheezing.    Cardiovascular:  Negative for chest pain and palpitations.  "  Gastrointestinal:  Negative for abdominal pain, diarrhea, nausea and vomiting.   Genitourinary:  Negative for difficulty urinating, frequency, hematuria and urgency.   Musculoskeletal:  Positive for back pain. Negative for neck pain.   Skin:  Negative for rash.   Neurological:  Negative for dizziness, seizures, syncope and headaches.   Hematological:  Negative for adenopathy. Bruises/bleeds easily.   Psychiatric/Behavioral:  Negative for behavioral problems. The patient is not nervous/anxious.         Objective     Vitals:    01/30/24 1436   BP: 161/83   Pulse: 76   Resp: 16   Temp: 98 °F (36.7 °C)   TempSrc: Temporal   SpO2: 97%   Weight: 58.3 kg (128 lb 9.6 oz)   Height: 152.4 cm (60\")   PainSc: 0-No pain         1/30/2024     2:32 PM   Current Status   ECOG score 0       Physical Exam   Constitutional: She is oriented to person, place, and time. She appears well-developed. No distress.   HENT:   Head: Normocephalic.   Eyes: Pupils are equal, round, and reactive to light. Conjunctivae are normal. No scleral icterus.   Neck: No JVD present. No thyromegaly present.   Cardiovascular: Normal rate and regular rhythm. Exam reveals no gallop and no friction rub.   No murmur heard.  Pulmonary/Chest: Effort normal and breath sounds normal. She has no wheezes. She has no rales.   Abdominal: Soft. She exhibits no distension and no mass. There is no abdominal tenderness.   Musculoskeletal: Normal range of motion. No deformity.   Lymphadenopathy:     She has no cervical adenopathy.   Neurological: She is alert and oriented to person, place, and time. She has normal reflexes. No cranial nerve deficit.   Skin: Skin is warm and dry. No rash noted. No erythema.   Psychiatric: Her behavior is normal. Judgment normal.       I have reexamined the patient and the results are consistent with the previously documented exam. Darrel Roque MD     RECENT LABS:  Hematology WBC   Date Value Ref Range Status   01/24/2024 8.12 3.40 - " 10.80 10*3/mm3 Final   02/02/2023 10.6 3.4 - 10.8 x10E3/uL Final     RBC   Date Value Ref Range Status   01/24/2024 4.42 3.77 - 5.28 10*6/mm3 Final   02/02/2023 4.13 3.77 - 5.28 x10E6/uL Final     Hemoglobin   Date Value Ref Range Status   01/24/2024 15.0 12.0 - 15.9 g/dL Final     Hematocrit   Date Value Ref Range Status   01/24/2024 45.2 34.0 - 46.6 % Final     Platelets   Date Value Ref Range Status   01/24/2024 155 140 - 450 10*3/mm3 Final        FLOW CYTOMETRY 8/16/16:  Small monoclonal B-cell population ( 16% ).     PATHOLOGY 9/22/2023  Final Diagnosis   [facility] LAB CONSULTATION [id number]     1, 3.  Lymph node, left axilla, core biopsies and flow cytometric analysis: SMALL LYMPHOCYTIC LYMPHOMA (SLL).     2, 4.  Lymph node, right axilla, core biopsies and flow cytometric analysis: SMALL LYMPHOCYTIC LYMPHOMA (SLL).       IMAGING:  CT of the chest, abdomen, and pelvis with contrast 1/24/2024  IMPRESSION:  1. Increased axillary, retroperitoneal, and pelvic lymphadenopathy     2. Stable right upper lobe groundglass opacity, likely scarring     3. Additional stable incidental findings as above.      Assessment & Plan   1.  Small lymphocytic lymphoma with T p53 deletion and 13 q. deletion.  She is not terribly symptomatic but she is having progression of lymphadenopathy on her scans as noted above.  2.  Atrial fibrillation.  She has now been started on Xarelto and is following up with cardiology.    Plan  1.  We reviewed the CAT scan results with the patient and also reviewed her pathology report from her axillary node biopsies.  She does not have bulky disease but her nodes are progressing.  She also has a T p53 mutation which might make us more inclined to treat her initially with Bruton kinase inhibitors (Calquence or ibrutinib).  She is very concerned about the potential cost and side effects of these medications and we will have our pharmacy staff contact her to discuss these issues.  2.  She has been started on  Xarelto and will be following up with cardiology for atrial fibrillation.  3.  We will plan to see her back in our office in 2 weeks for further discussion about the possibility of starting treatment for her small lymphocytic lymphoma.

## 2024-02-05 ENCOUNTER — SPECIALTY PHARMACY (OUTPATIENT)
Dept: PHARMACY | Facility: HOSPITAL | Age: 84
End: 2024-02-05
Payer: MEDICARE

## 2024-02-05 ENCOUNTER — TELEPHONE (OUTPATIENT)
Dept: ONCOLOGY | Facility: CLINIC | Age: 84
End: 2024-02-05

## 2024-02-05 NOTE — PROGRESS NOTES
Staff message rec from [facility] Pharmacist-Dr Roque sent a message about pt possibly starting on Calquence 100 mg BID. Pt was concerned about cost and he has asked for [facility] Team to look into her copay and assistance programs that she may be eligible for.    I ran a test claim through [email or web address] and unfortunately her copay came back high, $2565.18. [employer] has funding currently open for her diagnosis-Matt amount $3250. We can also turn to the manufacture and ask for free Calquence due to cost.    I contacted pt and we discussed the above. She was glad to hear there was help for the Calquence. She states she is due to see Dr Roque on 2/16/2024 and the medication will be discussed.    Kala Garrison [facility] sent to B[facility] Harrington [facility]; Estelle Beard, Pharmacy Technician         Previous Messages       ----- Message -----  From: Darrel Roque MD  Sent: 1/30/2024   3:22 PM EST  To: Cindi Cain RN; [facility] Oral Onc Pool    This patient has small lymphocytic lymphoma and I discussed with her the possibility of starting on Calquence 100 mg p.o. twice daily.  She is concerned about the potential cost and I was hoping you could contact her to look into her potential co-pay and any patient assistance programs we may have that she would be eligible for.    Thanks     Estelle Beard, Pharmacy Technician  Specialty Pharmacy Technician

## 2024-02-05 NOTE — TELEPHONE ENCOUNTER
Caller: Tessie Mckeon    Relationship: Self    Best call back number: [phone]    What is the best time to reach you: ANYTIME    Who are you requesting to speak with (clinical staff, provider,  specific staff member): CLINICAL    What was the call regarding: PATIENT STATED DR DELVALLE TOLD HER THAT THE PHARMACY WAS SUPPOSED TO CALL HER AND SHE HASN'T RECVD A CALL YET. PLEASE CALL TO UPDATE ON THIS.

## 2024-02-16 ENCOUNTER — LAB (OUTPATIENT)
Dept: OTHER | Facility: HOSPITAL | Age: 84
End: 2024-02-16
Payer: MEDICARE

## 2024-02-16 ENCOUNTER — OFFICE VISIT (OUTPATIENT)
Dept: ONCOLOGY | Facility: CLINIC | Age: 84
End: 2024-02-16
Payer: MEDICARE

## 2024-02-16 ENCOUNTER — SPECIALTY PHARMACY (OUTPATIENT)
Dept: PHARMACY | Facility: HOSPITAL | Age: 84
End: 2024-02-16
Payer: MEDICARE

## 2024-02-16 VITALS
RESPIRATION RATE: 16 BRPM | OXYGEN SATURATION: 96 % | WEIGHT: 126.9 LBS | TEMPERATURE: 97.8 F | HEART RATE: 69 BPM | HEIGHT: 60 IN | BODY MASS INDEX: 24.91 KG/M2 | SYSTOLIC BLOOD PRESSURE: 165 MMHG | DIASTOLIC BLOOD PRESSURE: 85 MMHG

## 2024-02-16 DIAGNOSIS — C83.00 SMALL LYMPHOCYTIC LYMPHOMA: Primary | ICD-10-CM

## 2024-02-16 DIAGNOSIS — D72.820 MONOCLONAL B-CELL LYMPHOCYTOSIS: ICD-10-CM

## 2024-02-16 DIAGNOSIS — C83.00 SMALL LYMPHOCYTIC LYMPHOMA: ICD-10-CM

## 2024-02-16 LAB
ALBUMIN SERPL-MCNC: 4.4 G/DL (ref 3.5–5.2)
ALBUMIN/GLOB SERPL: 1.8 G/DL
ALP SERPL-CCNC: 53 U/L (ref 39–117)
ALT SERPL W P-5'-P-CCNC: 12 U/L (ref 1–33)
ANION GAP SERPL CALCULATED.3IONS-SCNC: 9.6 MMOL/L (ref 5–15)
AST SERPL-CCNC: 15 U/L (ref 1–32)
BASOPHILS # BLD AUTO: 0.04 10*3/MM3 (ref 0–0.2)
BASOPHILS NFR BLD AUTO: 0.5 % (ref 0–1.5)
BILIRUB SERPL-MCNC: 0.8 MG/DL (ref 0–1.2)
BUN SERPL-MCNC: 23 MG/DL (ref 8–23)
BUN/CREAT SERPL: 20.2 (ref 7–25)
CALCIUM SPEC-SCNC: 9.9 MG/DL (ref 8.6–10.5)
CHLORIDE SERPL-SCNC: 106 MMOL/L (ref 98–107)
CO2 SERPL-SCNC: 26.4 MMOL/L (ref 22–29)
CREAT SERPL-MCNC: 1.14 MG/DL (ref 0.57–1)
DEPRECATED RDW RBC AUTO: 49.9 FL (ref 37–54)
EGFRCR SERPLBLD CKD-EPI 2021: 47.3 ML/MIN/1.73
EOSINOPHIL # BLD AUTO: 0.07 10*3/MM3 (ref 0–0.4)
EOSINOPHIL NFR BLD AUTO: 0.9 % (ref 0.3–6.2)
ERYTHROCYTE [DISTWIDTH] IN BLOOD BY AUTOMATED COUNT: 13.1 % (ref 12.3–15.4)
GLOBULIN UR ELPH-MCNC: 2.5 GM/DL
GLUCOSE SERPL-MCNC: 104 MG/DL (ref 65–99)
HCT VFR BLD AUTO: 43.7 % (ref 34–46.6)
HGB BLD-MCNC: 14.6 G/DL (ref 12–15.9)
IMM GRANULOCYTES # BLD AUTO: 0.05 10*3/MM3 (ref 0–0.05)
IMM GRANULOCYTES NFR BLD AUTO: 0.6 % (ref 0–0.5)
LYMPHOCYTES # BLD AUTO: 4.79 10*3/MM3 (ref 0.7–3.1)
LYMPHOCYTES NFR BLD AUTO: 58.3 % (ref 19.6–45.3)
MCH RBC QN AUTO: 34.2 PG (ref 26.6–33)
MCHC RBC AUTO-ENTMCNC: 33.4 G/DL (ref 31.5–35.7)
MCV RBC AUTO: 102.3 FL (ref 79–97)
MONOCYTES # BLD AUTO: 0.36 10*3/MM3 (ref 0.1–0.9)
MONOCYTES NFR BLD AUTO: 4.4 % (ref 5–12)
NEUTROPHILS NFR BLD AUTO: 2.91 10*3/MM3 (ref 1.7–7)
NEUTROPHILS NFR BLD AUTO: 35.3 % (ref 42.7–76)
NRBC BLD AUTO-RTO: 0 /100 WBC (ref 0–0.2)
PLAT MORPH BLD: NORMAL
PLATELET # BLD AUTO: 174 10*3/MM3 (ref 140–450)
PMV BLD AUTO: 10.4 FL (ref 6–12)
POTASSIUM SERPL-SCNC: 4.4 MMOL/L (ref 3.5–5.2)
PROT SERPL-MCNC: 6.9 G/DL (ref 6–8.5)
RBC # BLD AUTO: 4.27 10*6/MM3 (ref 3.77–5.28)
RBC MORPH BLD: NORMAL
SODIUM SERPL-SCNC: 142 MMOL/L (ref 136–145)
WBC MORPH BLD: NORMAL
WBC NRBC COR # BLD AUTO: 8.22 10*3/MM3 (ref 3.4–10.8)

## 2024-02-16 PROCEDURE — 85025 COMPLETE CBC W/AUTO DIFF WBC: CPT | Performed by: INTERNAL MEDICINE

## 2024-02-16 PROCEDURE — 80053 COMPREHEN METABOLIC PANEL: CPT | Performed by: INTERNAL MEDICINE

## 2024-02-16 PROCEDURE — 36415 COLL VENOUS BLD VENIPUNCTURE: CPT

## 2024-02-16 PROCEDURE — 85007 BL SMEAR W/DIFF WBC COUNT: CPT | Performed by: INTERNAL MEDICINE

## 2024-02-21 ENCOUNTER — SPECIALTY PHARMACY (OUTPATIENT)
Dept: PHARMACY | Facility: HOSPITAL | Age: 84
End: 2024-02-21
Payer: MEDICARE

## 2024-02-21 NOTE — PROGRESS NOTES
I spoke with pt about her Xarelto 15 mg cost today. At this time-her copay is quite high, $566. This is not affordable for her. She currently has 7 days on hand. She will be brought in for a sample supply.    I explained to her that there is no free drug program for the Xarelto since she has insurance. [email or web address] has a program that offers a 30 day supply for $85 but this does not open until April.    She wanted to get Dr Roque's thoughts on Warfarin. She meant to ask him at her last appt but forgot.     I have sent a message to Cindi, Clinical RN regarding a sample supply  and also about Warfarin.       Estelle Beard, Pharmacy Technician  Specialty Pharmacy Technician

## 2024-03-14 NOTE — TELEPHONE ENCOUNTER
Samples placed up front.  Pt to pickup.  Also inclosed information for Xarelto and me so the pt can call and inquire about pt assistance.  (Done)

## 2024-04-01 ENCOUNTER — SPECIALTY PHARMACY (OUTPATIENT)
Dept: PHARMACY | Facility: HOSPITAL | Age: 84
End: 2024-04-01
Payer: MEDICARE

## 2024-04-01 NOTE — PROGRESS NOTES
Call rec from pt-She was asking if she can enroll for the Xarelto With Me Program in May. I informed her the program is open from April to December and she can enroll anytime during those months. She asked for me to assist her with doing this and will call me after she sees Cardiologist on 4/26/2024.     Estelle Beard, Pharmacy Technician  Specialty Pharmacy Technician

## 2024-04-26 ENCOUNTER — OFFICE VISIT (OUTPATIENT)
Dept: CARDIOLOGY | Facility: CLINIC | Age: 84
End: 2024-04-26
Payer: MEDICARE

## 2024-04-26 ENCOUNTER — HOSPITAL ENCOUNTER (OUTPATIENT)
Dept: CARDIOLOGY | Facility: HOSPITAL | Age: 84
Discharge: HOME | End: 2024-04-26
Payer: MEDICARE

## 2024-04-26 ENCOUNTER — TELEPHONE (OUTPATIENT)
Dept: CARDIOLOGY | Facility: CLINIC | Age: 84
End: 2024-04-26

## 2024-04-26 ENCOUNTER — HOSPITAL ENCOUNTER (OUTPATIENT)
Dept: CT IMAGING | Facility: HOSPITAL | Age: 84
Discharge: HOME | End: 2024-04-26
Payer: MEDICARE

## 2024-04-26 VITALS
SYSTOLIC BLOOD PRESSURE: 160 MMHG | WEIGHT: 127 LBS | BODY MASS INDEX: 25.6 KG/M2 | DIASTOLIC BLOOD PRESSURE: 82 MMHG | HEIGHT: 59 IN

## 2024-04-26 VITALS
SYSTOLIC BLOOD PRESSURE: 168 MMHG | DIASTOLIC BLOOD PRESSURE: 88 MMHG | HEIGHT: 59 IN | HEART RATE: 77 BPM | WEIGHT: 127 LBS | BODY MASS INDEX: 25.6 KG/M2

## 2024-04-26 DIAGNOSIS — R55 NEAR SYNCOPE: Primary | ICD-10-CM

## 2024-04-26 DIAGNOSIS — R55 NEAR SYNCOPE: ICD-10-CM

## 2024-04-26 LAB
AORTIC ARCH: 2.5 CM
ASCENDING AORTA: 2.3 CM
BH CV ECHO MEAS - ACS: 1.5 CM
BH CV ECHO MEAS - AO MAX PG: 9.7 MMHG
BH CV ECHO MEAS - AO MEAN PG: 4.9 MMHG
BH CV ECHO MEAS - AO ROOT DIAM: 2.8 CM
BH CV ECHO MEAS - AO V2 MAX: 156.1 CM/SEC
BH CV ECHO MEAS - AO V2 VTI: 34.9 CM
BH CV ECHO MEAS - AVA(I,D): 1.41 CM2
BH CV ECHO MEAS - EDV(CUBED): 67.6 ML
BH CV ECHO MEAS - EDV(MOD-SP2): 42 ML
BH CV ECHO MEAS - EDV(MOD-SP4): 50 ML
BH CV ECHO MEAS - EF(MOD-BP): 68 %
BH CV ECHO MEAS - EF(MOD-SP2): 71.4 %
BH CV ECHO MEAS - EF(MOD-SP4): 64 %
BH CV ECHO MEAS - ESV(CUBED): 19.2 ML
BH CV ECHO MEAS - ESV(MOD-SP2): 12 ML
BH CV ECHO MEAS - ESV(MOD-SP4): 18 ML
BH CV ECHO MEAS - FS: 34.2 %
BH CV ECHO MEAS - IVS/LVPW: 0.85 CM
BH CV ECHO MEAS - IVSD: 0.68 CM
BH CV ECHO MEAS - LAT PEAK E' VEL: 8.6 CM/SEC
BH CV ECHO MEAS - LV DIASTOLIC VOL/BSA (35-75): 32.9 CM2
BH CV ECHO MEAS - LV MASS(C)D: 86.7 GRAMS
BH CV ECHO MEAS - LV MAX PG: 4.9 MMHG
BH CV ECHO MEAS - LV MEAN PG: 2.04 MMHG
BH CV ECHO MEAS - LV SYSTOLIC VOL/BSA (12-30): 11.8 CM2
BH CV ECHO MEAS - LV V1 MAX: 110.2 CM/SEC
BH CV ECHO MEAS - LV V1 VTI: 23.8 CM
BH CV ECHO MEAS - LVIDD: 4.1 CM
BH CV ECHO MEAS - LVIDS: 2.7 CM
BH CV ECHO MEAS - LVOT AREA: 2.06 CM2
BH CV ECHO MEAS - LVOT DIAM: 1.62 CM
BH CV ECHO MEAS - LVPWD: 0.8 CM
BH CV ECHO MEAS - MED PEAK E' VEL: 7.3 CM/SEC
BH CV ECHO MEAS - MV A DUR: 0.12 SEC
BH CV ECHO MEAS - MV A MAX VEL: 110.2 CM/SEC
BH CV ECHO MEAS - MV DEC SLOPE: 289.5 CM/SEC2
BH CV ECHO MEAS - MV DEC TIME: 0.23 SEC
BH CV ECHO MEAS - MV E MAX VEL: 83.9 CM/SEC
BH CV ECHO MEAS - MV E/A: 0.76
BH CV ECHO MEAS - MV MAX PG: 6.1 MMHG
BH CV ECHO MEAS - MV MEAN PG: 1.79 MMHG
BH CV ECHO MEAS - MV P1/2T: 94.2 MSEC
BH CV ECHO MEAS - MV V2 VTI: 29.2 CM
BH CV ECHO MEAS - MVA(P1/2T): 2.34 CM2
BH CV ECHO MEAS - MVA(VTI): 1.68 CM2
BH CV ECHO MEAS - PA ACC TIME: 0.16 SEC
BH CV ECHO MEAS - PA V2 MAX: 96.4 CM/SEC
BH CV ECHO MEAS - PULM A REVS DUR: 0.12 SEC
BH CV ECHO MEAS - PULM A REVS VEL: 32.6 CM/SEC
BH CV ECHO MEAS - PULM DIAS VEL: 39.4 CM/SEC
BH CV ECHO MEAS - PULM S/D: 1.61
BH CV ECHO MEAS - PULM SYS VEL: 63.4 CM/SEC
BH CV ECHO MEAS - RAP SYSTOLE: 3 MMHG
BH CV ECHO MEAS - RV MAX PG: 2.8 MMHG
BH CV ECHO MEAS - RV V1 MAX: 83.6 CM/SEC
BH CV ECHO MEAS - RV V1 VTI: 19.6 CM
BH CV ECHO MEAS - RVSP: 35 MMHG
BH CV ECHO MEAS - SV(LVOT): 49 ML
BH CV ECHO MEAS - SV(MOD-SP2): 30 ML
BH CV ECHO MEAS - SV(MOD-SP4): 32 ML
BH CV ECHO MEAS - SVI(LVOT): 32.3 ML/M2
BH CV ECHO MEAS - SVI(MOD-SP2): 19.7 ML/M2
BH CV ECHO MEAS - SVI(MOD-SP4): 21 ML/M2
BH CV ECHO MEAS - TAPSE (>1.6): 2.11 CM
BH CV ECHO MEAS - TR MAX PG: 32.6 MMHG
BH CV ECHO MEAS - TR MAX VEL: 285.7 CM/SEC
BH CV ECHO MEASUREMENTS AVERAGE E/E' RATIO: 10.55
BH CV XLRA - RV BASE: 3 CM
BH CV XLRA - RV LENGTH: 6.1 CM
BH CV XLRA - RV MID: 2.32 CM
BH CV XLRA - TDI S': 10.3 CM/SEC
LEFT ATRIUM VOLUME INDEX: 20 ML/M2
SINUS: 2.16 CM
STJ: 1.84 CM

## 2024-04-26 PROCEDURE — 93306 TTE W/DOPPLER COMPLETE: CPT

## 2024-04-26 PROCEDURE — 70450 CT HEAD/BRAIN W/O DYE: CPT

## 2024-04-26 PROCEDURE — 99214 OFFICE O/P EST MOD 30 MIN: CPT | Performed by: INTERNAL MEDICINE

## 2024-04-26 PROCEDURE — 3079F DIAST BP 80-89 MM HG: CPT | Performed by: INTERNAL MEDICINE

## 2024-04-26 PROCEDURE — 3077F SYST BP >= 140 MM HG: CPT | Performed by: INTERNAL MEDICINE

## 2024-04-26 PROCEDURE — 93000 ELECTROCARDIOGRAM COMPLETE: CPT | Performed by: INTERNAL MEDICINE

## 2024-04-26 RX ORDER — PIMECROLIMUS 10 MG/G
CREAM TOPICAL
COMMUNITY
Start: 2024-04-17 | End: 2024-04-26

## 2024-04-26 NOTE — NURSING NOTE
This nurse spoke to Dr Evans who said patient can go home and that patient was given instructions on what to do.

## 2024-04-26 NOTE — TELEPHONE ENCOUNTER
I talked to patient regarding echo and CT results.  Asked her to follow-up with Dr. Penaloza or her ophthalmologist regarding ocular findings.  I asked her to stay hydrated she pressure with standing.  She is to keep pressure the weekend Tuesday.  She is also wearing a Zio patch.

## 2024-04-29 NOTE — TELEPHONE ENCOUNTER
Pt called to report the BP Readings (1-2 hours after meds)    133/73 73  136/73 69  129/71 61    Current Meds:   Verapamil 240mg Q PM    She Has the Zio still on but has a trip planning in a few weeks to see her grand-daughter.  Do you have any issues with her doing this?

## 2024-05-01 NOTE — TELEPHONE ENCOUNTER
Spoke with pt.  Verbalized understanding.  She will have the Zio off on the 10th and her trip is the 14th.  She will be gone for about 2 weeks and wanted to make sure you were ok with this.  She is not wanting to miss this.

## 2024-05-02 NOTE — TELEPHONE ENCOUNTER
It will typically take about a week to get results back from the Ziopatch after she is during the send.  Therefore cannot guarantee that it is taken off on the 10th we will have results back on the 14th before her trip.  If she has had several episodes of dizziness so far with the Ziopatch on, she may want to go ahead and turn this and if she would like these results back before she travels

## 2024-05-03 NOTE — TELEPHONE ENCOUNTER
Spoke with pt.  Verbalized understanding. She has not have any more sx.  No other questions.  Advised her we dont know what kind of episodes she was having so we can't give advice about travel.  (Done)

## 2024-05-10 ENCOUNTER — LAB (OUTPATIENT)
Dept: OTHER | Facility: HOSPITAL | Age: 84
End: 2024-05-10
Payer: MEDICARE

## 2024-05-10 ENCOUNTER — OFFICE VISIT (OUTPATIENT)
Dept: ONCOLOGY | Facility: CLINIC | Age: 84
End: 2024-05-10
Payer: MEDICARE

## 2024-05-10 VITALS
HEART RATE: 78 BPM | RESPIRATION RATE: 18 BRPM | DIASTOLIC BLOOD PRESSURE: 82 MMHG | OXYGEN SATURATION: 95 % | BODY MASS INDEX: 25.48 KG/M2 | WEIGHT: 126.4 LBS | HEIGHT: 59 IN | SYSTOLIC BLOOD PRESSURE: 169 MMHG | TEMPERATURE: 97.1 F

## 2024-05-10 DIAGNOSIS — C83.00 SMALL LYMPHOCYTIC LYMPHOMA: Primary | ICD-10-CM

## 2024-05-10 DIAGNOSIS — I48.91 ATRIAL FIBRILLATION, UNSPECIFIED TYPE: ICD-10-CM

## 2024-05-10 DIAGNOSIS — D72.820 MONOCLONAL B-CELL LYMPHOCYTOSIS: ICD-10-CM

## 2024-05-10 DIAGNOSIS — C83.00 SMALL LYMPHOCYTIC LYMPHOMA: ICD-10-CM

## 2024-05-10 LAB
BASOPHILS # BLD AUTO: 0.04 10*3/MM3 (ref 0–0.2)
BASOPHILS NFR BLD AUTO: 0.4 % (ref 0–1.5)
DEPRECATED RDW RBC AUTO: 46.1 FL (ref 37–54)
EOSINOPHIL # BLD AUTO: 0.05 10*3/MM3 (ref 0–0.4)
EOSINOPHIL NFR BLD AUTO: 0.5 % (ref 0.3–6.2)
ERYTHROCYTE [DISTWIDTH] IN BLOOD BY AUTOMATED COUNT: 12.4 % (ref 12.3–15.4)
HCT VFR BLD AUTO: 41.2 % (ref 34–46.6)
HGB BLD-MCNC: 14.4 G/DL (ref 12–15.9)
IMM GRANULOCYTES # BLD AUTO: 0.06 10*3/MM3 (ref 0–0.05)
IMM GRANULOCYTES NFR BLD AUTO: 0.6 % (ref 0–0.5)
LYMPHOCYTES # BLD AUTO: 6.47 10*3/MM3 (ref 0.7–3.1)
LYMPHOCYTES NFR BLD AUTO: 63.6 % (ref 19.6–45.3)
MCH RBC QN AUTO: 35 PG (ref 26.6–33)
MCHC RBC AUTO-ENTMCNC: 35 G/DL (ref 31.5–35.7)
MCV RBC AUTO: 100 FL (ref 79–97)
MONOCYTES # BLD AUTO: 0.36 10*3/MM3 (ref 0.1–0.9)
MONOCYTES NFR BLD AUTO: 3.5 % (ref 5–12)
NEUTROPHILS NFR BLD AUTO: 3.19 10*3/MM3 (ref 1.7–7)
NEUTROPHILS NFR BLD AUTO: 31.4 % (ref 42.7–76)
NRBC BLD AUTO-RTO: 0 /100 WBC (ref 0–0.2)
PLAT MORPH BLD: NORMAL
PLATELET # BLD AUTO: 152 10*3/MM3 (ref 140–450)
PMV BLD AUTO: 10.2 FL (ref 6–12)
RBC # BLD AUTO: 4.12 10*6/MM3 (ref 3.77–5.28)
RBC MORPH BLD: NORMAL
SMUDGE CELLS BLD QL SMEAR: NORMAL
WBC NRBC COR # BLD AUTO: 10.17 10*3/MM3 (ref 3.4–10.8)

## 2024-05-10 PROCEDURE — 85025 COMPLETE CBC W/AUTO DIFF WBC: CPT | Performed by: INTERNAL MEDICINE

## 2024-05-10 PROCEDURE — 85007 BL SMEAR W/DIFF WBC COUNT: CPT | Performed by: INTERNAL MEDICINE

## 2024-05-10 RX ORDER — FLUTICASONE PROPIONATE 50 MCG
SPRAY, SUSPENSION (ML) NASAL
COMMUNITY

## 2024-05-10 RX ORDER — BENZONATATE 100 MG/1
CAPSULE ORAL
COMMUNITY

## 2024-05-10 RX ORDER — TRIAMCINOLONE ACETONIDE 1 MG/G
CREAM TOPICAL
COMMUNITY

## 2024-05-22 ENCOUNTER — TELEPHONE (OUTPATIENT)
Dept: CARDIOLOGY | Facility: CLINIC | Age: 84
End: 2024-05-22
Payer: MEDICARE

## 2024-05-22 NOTE — TELEPHONE ENCOUNTER
North Boyer MD Swift, Vicky A, APRN; Barbi Martinez APRN  She only has 5% AF burden.  The A-fib is always short-lived.  The episodes are not very fast.  I would stop the verapamil.          Previous Messages       ----- Message -----  From: Randee Camara APRN  Sent: 5/21/2024   3:00 PM EDT  To: JAZZY Manrique; North Boyer MD    Is she still taking verapamil 240 mg nightly?  ----- Message -----  From: Barbi Martinez APRN  Sent: 5/21/2024   2:51 PM EDT  To: North Boyer MD; JAZZY Vasquez    Abnormal Holter monitor.  Dr. Evans is out of the office this week.  She is having pauses greater than 4 seconds in the middle of the night.  She has been experiencing dizziness and near syncopal episodes.  She has known PAF and is on rivaroxaban.    Dr. North Boyer-please review and advise if we need to do anything about the pauses since Dr. Evans is out of the office.  Thank you

## 2024-05-22 NOTE — TELEPHONE ENCOUNTER
I tried calling patient and left messages on both phone numbers to return her call.  I will plan to stop her verapamil per Dr. North Boyer.    She has had elevated blood pressure and recently had a 14 mmHg blood pressure drop upon standing.  Sees nephrology for renal insufficiency which has been stable.  She will need to be on something for blood pressure control.

## 2024-05-23 PROBLEM — I45.5 SINUS PAUSE: Status: ACTIVE | Noted: 2024-05-23

## 2024-05-23 RX ORDER — AMLODIPINE BESYLATE 5 MG/1
5 TABLET ORAL DAILY
Qty: 30 TABLET | Refills: 1 | Status: SHIPPED | OUTPATIENT
Start: 2024-05-23

## 2024-05-23 NOTE — TELEPHONE ENCOUNTER
I spoke with patient via phone.  I recommended stopping the verapamil because of the pauses per Dr. Boyer's recommendation.  Start amlodipine 5 mg daily for better blood pressure control.  She verbalized understanding.    Scheduling-please arrange a 1 to 2-week follow-up visit with JAZZY Cowan.  Thank you

## 2024-05-24 ENCOUNTER — TELEPHONE (OUTPATIENT)
Dept: CARDIOLOGY | Facility: CLINIC | Age: 84
End: 2024-05-24
Payer: MEDICARE

## 2024-05-24 NOTE — TELEPHONE ENCOUNTER
Left voicemail for patient to call back to schedule appointment with Charissa Michele in 1-2 weeks.

## 2024-05-24 NOTE — TELEPHONE ENCOUNTER
5/24/2024 1x called. M for patient to call back to schedule. Also made telephone encounter if they get the Hub.

## 2024-06-05 NOTE — PROGRESS NOTES
Date of Office Visit: 2024  Encounter Provider: JAZZY Jc  Place of Service: [facility] CARDIOLOGY  Patient Name: Tessie Mckeon  :1940    Chief complaint  Paroxysmal atrial fibrillation     History of Present Illness  Patient is a 83 y.o. year old female  patient of Dr. Evans. Past medical history includes hypertension, mild renal insufficiency (followed by Dr. 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  She converted to sinus rhythm while in ER.  It was recommended she take Eliquis but not pursue this due to financial constraints.  She was seen in our office in 2023.  Echocardiogram showed an ejection fraction of 65%, normal diastolic function, normal valve function and no significant pulmonary hypertension and negative saline study.  2-week Zio patch showed several months of nonspecific atrial tachycardia with atrial fibrillation occurring less than 1% total monitor time longest being 25 minutes.  She subsequently started Xarelto and renal dose.  She continued verapamil.  Patient had a follow-up CT scan on 2024 that showed slight increase in lymphadenopathy and there was discussion of possibly starting Calquence.  Review however patient was reluctant to consider this and opted to pursue repeat imaging in 6 months.     At last visit with Dr. Higgins she had complaints of episodes of extreme weakness, nausea, and emesis. Noncontrast CT of the head showed no acute intracranial abnormality but there was an unusual focal area of ossification near the right orbit that measured 10 x 7 x 5 mm.  This was of uncertain etiology with concerns for osteoma. Echocardiogram showed LVEF 68%, grade 1 diastolic dysfunction, moderately dilated left atrial cavity, mild to moderately dilated right atrial cavity, mild mitral  regurgitation with RVSP 35 mmHg. Holter monitor was performed that showed significant pauses.  Her verapamil was discontinued at the recommendation of Dr. Boyer and she was started on amlodipine 5 mg daily.    Interval history  Today for routine follow-up.  I will visit with her today and have reviewed her medical record.  Since changing verapamil to amlodipine blood pressure has been controlled at home in the 130s over 80s.  She continues to have occasional dizziness but no recurrent episodes of extreme weakness, nausea, emesis.  She denies palpitations, shortness of breath, chest pain or chest pressure, syncope or presyncope.  She reports stable fatigue and edema.  She notes that her blood pressure device at home is old and would like a prescription for new device.  She also states that she currently takes amlodipine at 8 PM with her Xarelto.  She wonders if this would be more effective if she took amlodipine at lunchtime.    Past Medical History:   Diagnosis Date    Arthritis     Bacterial vaginitis     Bronchitis     Colon polyps     Diverticulosis     Dizziness     Elevated lipids     Fatigue     GERD (gastroesophageal reflux disease)     Headache     Hematuria     History of EKG     date?; borderline    Hypertension 12/18/2022    Hypovitaminosis D     Lump, breast     Unable to remember which breast    Macrocytosis     Oral candidiasis     Osteoporosis     Postmenopausal bleeding     Suprapubic tenderness     Vaginal atrophy     Yeast vaginitis      Past Surgical History:   Procedure Laterality Date    BREAST EXCISIONAL BIOPSY Left     12-15 years ago in Dr. Hendricks's office    BREAST [facility]GERY Left 2003    [facility]    CATARACT EXTRACTION  2009    COLONOSCOPY  10/23/2017    Dr Broderick    DILATATION AND CURETTAGE  09/21/2015    EYE SURGERY      FOOT SURGERY Right 1999    GUM SURGERY  1997    UPPER GASTROINTESTINAL ENDOSCOPY  02/01/2021    EULALIA HERNANDEZ MD     Outpatient Medications Prior to Visit   Medication Sig  Dispense Refill    amLODIPine (NORVASC) 5 MG tablet Take 1 tablet by mouth Daily. Stop verapamil 30 tablet 1    famotidine (PEPCID) 40 MG tablet Take 1 tablet by mouth Every Night. 90 tablet 0    rivaroxaban (XARELTO) 15 MG tablet Take 1 tablet by mouth Daily. 28 tablet 0    sucralfate (CARAFATE) 1 g tablet TAKE 1 TABLET BY MOUTH FOUR TIMES A DAY BEFORE MEALS      triamcinolone (KENALOG) 0.1 % cream APPLY TO LEFT HAND TWICE DAILY AS NEEDED      benzonatate (TESSALON) 100 MG capsule TAKE 2 CAPSULES BY MOUTH 2 TIMES A DAY AS NEEDED FOR COUGH DO NOT CRUSH OR CHEW (Patient not taking: Reported on 6/6/2024)      fluticasone (FLONASE) 50 MCG/ACT nasal spray INSTILL 2 SPRAYS INTO EACH NOSTRIL ONCE DAILY. BEFORE USE, SHAKE GENTLY AND PRIME PUMP. AFTER USE, CLEAN TIP AND REPLACE CAP. (Patient not taking: Reported on 6/6/2024)       No facility-administered medications prior to visit.       Allergies as of 06/06/2024 - Reviewed 06/06/2024   Allergen Reaction Noted    Clindamycin/lincomycin Other (See Comments) 07/13/2017    Proton pump inhibitors Other (See Comments) 07/13/2017    Pneumovax [pneumococcal polysaccharide vaccine] Rash 12/21/2022     Social History     Socioeconomic History    Marital status:      Spouse name: Sina    Number of children: 1    Years of education: High School   Tobacco Use    Smoking status: Never     Passive exposure: Never    Smokeless tobacco: Never    Tobacco comments:     Caffeine: 1 coffee daily   Vaping Use    Vaping status: Never Used   Substance and Sexual Activity    Alcohol use: No    Drug use: No    Sexual activity: Defer     Family History   Problem Relation Age of Onset    Heart disease Mother     Heart attack Mother     Other Mother         GERD    No Known Problems Father     Breast cancer Sister         Diagnosed at age 90-91    No Known Problems Brother     No Known Problems Maternal Grandmother     No Known Problems Maternal Grandfather     No Known Problems Paternal  "Grandmother     No Known Problems Paternal Grandfather     No Known Problems Maternal Aunt     No Known Problems Maternal Uncle     No Known Problems Paternal Aunt     No Known Problems Paternal Uncle     Ovarian cancer Neg Hx      Review of Systems   Constitutional: Positive for malaise/fatigue.   Cardiovascular:  Positive for leg swelling. Negative for chest pain, claudication, dyspnea on exertion, near-syncope, orthopnea, palpitations, paroxysmal nocturnal dyspnea and syncope.   Respiratory:  Negative for shortness of breath.    Neurological:  Positive for dizziness. Negative for brief paralysis, headaches and light-headedness.   All other systems reviewed and are negative.       Objective:     Vitals:    06/06/24 0926 06/06/24 0937   BP: 178/90 (!) 182/90   BP Location: Left arm Right arm   Patient Position: Sitting Sitting   Cuff Size: Adult Adult   Pulse: 71    Weight: 57.2 kg (126 lb 3.2 oz)    Height: 152.4 cm (60\")      Body mass index is 24.65 kg/m².    Vitals reviewed.   Constitutional:       General: Not in acute distress.     Appearance: Well-developed and not in distress. Not diaphoretic.   HENT:      Head: Normocephalic.   Pulmonary:      Effort: Pulmonary effort is normal. No respiratory distress.      Breath sounds: Normal breath sounds. No wheezing. No rhonchi. No rales.   Cardiovascular:      Normal rate. Regular rhythm.      Murmurs: There is no murmur.   Pulses:     Radial: 2+ bilaterally.  Edema:     Peripheral edema absent.   Skin:     General: Skin is warm and dry. There is no cyanosis.      Findings: No rash.   Neurological:      Mental Status: Alert and oriented to person, place, and time.   Psychiatric:         Behavior: Behavior normal. Behavior is cooperative.         Thought Content: Thought content normal.         Judgment: Judgment normal.       Lab Review:     Lab Results   Component Value Date     02/16/2024     01/24/2024    K 4.4 02/16/2024    K 4.4 01/24/2024    CL " "106 02/16/2024     01/24/2024    CO2 26.4 02/16/2024    CO2 26.0 01/24/2024    BUN 23 02/16/2024    BUN 22 01/24/2024    CREATININE 1.14 (H) 02/16/2024    CREATININE 1.20 01/24/2024    EGFRIFNONA 43 (L) 09/28/2021    EGFRIFNONA 47 (L) 09/29/2020    GLUCOSE 104 (H) 02/16/2024    GLUCOSE 109 (H) 01/24/2024    CALCIUM 9.9 02/16/2024    CALCIUM 10.0 01/24/2024    PROTENTOTREF 6.7 01/24/2024    ALBUMIN 4.4 02/16/2024    ALBUMIN 4.3 01/24/2024    ALBUMIN 3.9 01/24/2024    BILITOT 0.8 02/16/2024    BILITOT 0.6 01/24/2024    AST 15 02/16/2024    AST 15 01/24/2024    ALT 12 02/16/2024    ALT 10 01/24/2024     Lab Results   Component Value Date    WBC 10.17 05/10/2024    WBC 8.22 02/16/2024    HGB 14.4 05/10/2024    HGB 14.6 02/16/2024    HCT 41.2 05/10/2024    HCT 43.7 02/16/2024    .0 (H) 05/10/2024    .3 (H) 02/16/2024     05/10/2024     02/16/2024     Lab Results   Component Value Date    PROBNP 355.0 12/29/2023     No results found for: \"CKTOTAL\", \"CKMB\", \"CKMBINDEX\", \"TROPONINI\", \"TROPONINT\"  Lab Results   Component Value Date    TSH 2.360 12/29/2023    TSH 1.380 06/10/2016             ECG 12 Lead    Date/Time: 6/6/2024 11:29 AM  Performed by: Charissa Michele APRN    Authorized by: Charissa Michele APRN  Comparison: compared with previous ECG   Similar to previous ECG  Rhythm: sinus rhythm  Rate: normal  BPM: 71  QRS axis: normal  Other findings: low voltage  Comments: Similar to prior        Assessment:       Diagnosis Plan   1. Paroxysmal atrial fibrillation        2. Near syncope        3. Monoclonal B-cell lymphocytosis        4. Renal insufficiency        5. SOMMERS (dyspnea on exertion)        6. Primary hypertension          Plan:       1.  Paroxysmal atrial fibrillation.  [id number] score 4.  She is tolerating Xarelto.  Verapamil changed to amlodipine due to pauses on recent Holter monitor.  She is in sinus rhythm.  Therapy with Calquence is being considered with increased " risk for atrial fibrillation.  However benefits are greater than the risks and will need to address other risk factors for recurrent atrial arrhythmia.  She will call if she has any worsening palpitations or tachycardia.  Will have her follow-up in 3 months after next visit with oncology to determine plan of treatment.  2.  Hypertension.  Quite elevated today though home readings mildly elevated.  Will continue with current amlodipine dose for now.  I sent in a prescription for a new blood pressure device for her to her pharmacy.  She will bring devices to her upcoming appointment with PCP to ensure they are accurate and call blood pressure readings in several weeks.  3.  Dizziness and near syncope.  Blood pressure has been elevated.  However recent check of orthostatic pressures in the office with a 14 point drop.  Advised her to increase hydration.  Holter monitor showed 4-second pauses and verapamil was changed to amlodipine.  Echocardiogram with normal LVEF and no valvular issues.  CT of the head showed no acute intracranial abnormality but did show calcification of the sinuses for which she is going to see ENT.  4.  Renal insufficiency.  Followed by Dr. Clark.  Appears to be stable  5.  Lymphoma, slight increase in adenopathy on recent CT scans.  Has opted to not pursue treatment for now but repeat imaging in 6 months.  6.  Residual dyspnea.  Resolved      Time Spent: I spent 30 minutes caring for Tessie on this date of service. This time includes time spent by me in the following activities: preparing for the visit, reviewing tests, performing a medically appropriate examination and/or evaluations, counseling and educating the patient/family/caregiver, ordering medications, tests, or procedures, documenting information in the medical record, and independently interpreting results and communicating that information with the patient/family/caregiver.   I spent 1 minutes on the separately reported service of  ECG. This time is not included in the time used to support the E/M service also reported today.        Your medication list            Accurate as of June 6, 2024 11:30 AM. If you have any questions, ask your nurse or doctor.                START taking these medications        Instructions Last Dose Given Next Dose Due   ReliOn Blood Pressure Cuff misc  Started by: JAZZY Jc      Use 1 each 1 (One) Time for 1 dose.              CONTINUE taking these medications        Instructions Last Dose Given Next Dose Due   amLODIPine 5 MG tablet  Commonly known as: NORVASC      Take 1 tablet by mouth Daily. Stop verapamil       famotidine 40 MG tablet  Commonly known as: PEPCID      Take 1 tablet by mouth Every Night.       rivaroxaban 15 MG tablet  Commonly known as: XARELTO      Take 1 tablet by mouth Daily.       sucralfate 1 g tablet  Commonly known as: CARAFATE      TAKE 1 TABLET BY MOUTH FOUR TIMES A DAY BEFORE MEALS       triamcinolone 0.1 % cream  Commonly known as: KENALOG      APPLY TO LEFT HAND TWICE DAILY AS NEEDED                 Where to Get Your Medications        These medications were sent to [id number] - [place], [place] - [address] - [phone]  - [phone]   [address] [id number]      Phone: [phone]   ReliOn Blood Pressure Cuff misc         Patient is no longer taking -.  I corrected the med list to reflect this.  I did not stop these medications.    Return in about 3 months (around 9/6/2024) for with Dr. Evans.      Dictated utilizing Dragon dictation

## 2024-06-06 ENCOUNTER — OFFICE VISIT (OUTPATIENT)
Dept: CARDIOLOGY | Facility: CLINIC | Age: 84
End: 2024-06-06
Payer: MEDICARE

## 2024-06-06 VITALS
WEIGHT: 126.2 LBS | SYSTOLIC BLOOD PRESSURE: 182 MMHG | HEART RATE: 71 BPM | DIASTOLIC BLOOD PRESSURE: 90 MMHG | BODY MASS INDEX: 24.77 KG/M2 | HEIGHT: 60 IN

## 2024-06-06 DIAGNOSIS — R55 NEAR SYNCOPE: ICD-10-CM

## 2024-06-06 DIAGNOSIS — R06.09 DOE (DYSPNEA ON EXERTION): ICD-10-CM

## 2024-06-06 DIAGNOSIS — D72.820 MONOCLONAL B-CELL LYMPHOCYTOSIS: ICD-10-CM

## 2024-06-06 DIAGNOSIS — I10 PRIMARY HYPERTENSION: ICD-10-CM

## 2024-06-06 DIAGNOSIS — I48.0 PAROXYSMAL ATRIAL FIBRILLATION: Primary | ICD-10-CM

## 2024-06-06 DIAGNOSIS — N28.9 RENAL INSUFFICIENCY: ICD-10-CM

## 2024-06-06 PROCEDURE — 99214 OFFICE O/P EST MOD 30 MIN: CPT | Performed by: NURSE PRACTITIONER

## 2024-06-06 PROCEDURE — 3080F DIAST BP >= 90 MM HG: CPT | Performed by: NURSE PRACTITIONER

## 2024-06-06 PROCEDURE — 93000 ELECTROCARDIOGRAM COMPLETE: CPT | Performed by: NURSE PRACTITIONER

## 2024-06-06 PROCEDURE — 3077F SYST BP >= 140 MM HG: CPT | Performed by: NURSE PRACTITIONER

## 2024-07-01 ENCOUNTER — TELEPHONE (OUTPATIENT)
Dept: CARDIOLOGY | Facility: CLINIC | Age: 84
End: 2024-07-01
Payer: MEDICARE

## 2024-07-10 ENCOUNTER — SPECIALTY PHARMACY (OUTPATIENT)
Dept: PHARMACY | Facility: HOSPITAL | Age: 84
End: 2024-07-10
Payer: MEDICARE

## 2024-07-10 NOTE — PROGRESS NOTES
Call rec from pt about 10:30 this morning-She was inquiring once again on the Xarelto with me Program. We went over the details of the program. The price for a 30 day supply has increased to $89.    She has asked about Warfarin. I let her know that with the Warfarin-she would need to have frequent INR checks and be seen by the [facility] Pharmacist in the office.    She was a pt of Dr Roque's who since retired and is now scheduled to see Dr Rainey. She wanted to get his thoughts on Warfarin before committing to the $89 cost with the Xarelto with Me Program.    Message sent to Maren BEAUCHAMP, Clinical RN for Dr Rainey to discuss this with him when she was able.       Estelle Beard, Pharmacy Technician  Specialty Pharmacy Technician

## 2024-07-15 ENCOUNTER — TELEPHONE (OUTPATIENT)
Dept: ONCOLOGY | Facility: CLINIC | Age: 84
End: 2024-07-15
Payer: MEDICARE

## 2024-07-15 NOTE — TELEPHONE ENCOUNTER
Called the patient to let her know that we would be fine with her calling Cardiology and seeing about switching medications. I let her know she could call us back anytime if she needed anything and she was appreciative and v/u.

## 2024-07-15 NOTE — TELEPHONE ENCOUNTER
----- Message from Estelle WEBER sent at 7/10/2024 10:39 AM EDT -----  Regarding: Dr Roque pt transfered to Dr Rainey-Warfarin inquiry  This patient is currently on Xarelto 15 mg daily. The cost is very high for her and she has been getting samples from Dr Evans. She is interested in the Xarelto with me program but the $89 copay is still pretty high for her as a monthly cost. She is wanting to get Dr Rainey's input on Warfarin. She is aware of the frequent INR checks with Warfarin and thinks this might be a cheaper way to go. Can you discuss with Dr Rainey when you have a moment? She is scheduled to see him for the first time at the end of August.    Thank you,  Estelle

## 2024-08-08 ENCOUNTER — TELEPHONE (OUTPATIENT)
Dept: CARDIOLOGY | Facility: CLINIC | Age: 84
End: 2024-08-08
Payer: MEDICARE

## 2024-08-08 ENCOUNTER — SPECIALTY PHARMACY (OUTPATIENT)
Dept: PHARMACY | Facility: HOSPITAL | Age: 84
End: 2024-08-08
Payer: MEDICARE

## 2024-08-08 NOTE — TELEPHONE ENCOUNTER
Pt called the office to let us know that Dr. Roque's office would be sending over a program/form that our office will need to complete to assist pt in affording rivaroxaban.    Pt was also requesting samples of medication.    I've placed 2 week's worth of rivaroxaban 15 mg at the  for pickup.  Pt is aware that these are ready for them to pickup at their convenience.    LOT:  [id number]  EXP:     Thank you,    Annabel PICKETT RN  Triage [facility]  08/08/24 09:49 EDT

## 2024-08-08 NOTE — PROGRESS NOTES
I have assisted pt with signing up for Xarelto With Me where she can get her Xarelto for a cost of $89 +tax for a 30 day supply.    I let her know that [facility] will send a request for a new rx to Dr Evans' office.     I provided her with the phone number to [facility]'s pharmacy and told her she can also call Dr Evans' office to request the rx be sent to speed up the process.         Estelle Beard, Pharmacy Technician  Specialty Pharmacy Technician

## 2024-08-19 RX ORDER — AMLODIPINE BESYLATE 5 MG/1
TABLET ORAL
Qty: 30 TABLET | Refills: 0 | Status: SHIPPED | OUTPATIENT
Start: 2024-08-19

## 2024-08-23 ENCOUNTER — HOSPITAL ENCOUNTER (OUTPATIENT)
Dept: CT IMAGING | Facility: HOSPITAL | Age: 84
Discharge: HOME | End: 2024-08-23
Payer: MEDICARE

## 2024-08-23 ENCOUNTER — LAB (OUTPATIENT)
Dept: LAB | Facility: HOSPITAL | Age: 84
End: 2024-08-23
Payer: MEDICARE

## 2024-08-23 DIAGNOSIS — D72.820 MONOCLONAL B-CELL LYMPHOCYTOSIS: ICD-10-CM

## 2024-08-23 DIAGNOSIS — C83.00 SMALL LYMPHOCYTIC LYMPHOMA: ICD-10-CM

## 2024-08-23 LAB
ALBUMIN SERPL-MCNC: 4.2 G/DL (ref 3.5–5.2)
ALBUMIN/GLOB SERPL: 1.4 G/DL
ALP SERPL-CCNC: 58 U/L (ref 39–117)
ALT SERPL W P-5'-P-CCNC: 10 U/L (ref 1–33)
ANION GAP SERPL CALCULATED.3IONS-SCNC: 8.8 MMOL/L (ref 5–15)
AST SERPL-CCNC: 16 U/L (ref 1–32)
BILIRUB SERPL-MCNC: 0.9 MG/DL (ref 0–1.2)
BUN SERPL-MCNC: 19 MG/DL (ref 8–23)
BUN/CREAT SERPL: 15.2 (ref 7–25)
CALCIUM SPEC-SCNC: 10 MG/DL (ref 8.6–10.5)
CHLORIDE SERPL-SCNC: 107 MMOL/L (ref 98–107)
CLUMPED PLATELETS: PRESENT
CO2 SERPL-SCNC: 26.2 MMOL/L (ref 22–29)
CREAT SERPL-MCNC: 1.25 MG/DL (ref 0.57–1)
DEPRECATED RDW RBC AUTO: 49.4 FL (ref 37–54)
EGFRCR SERPLBLD CKD-EPI 2021: 42.1 ML/MIN/1.73
EOSINOPHIL # BLD MANUAL: 0.14 10*3/MM3 (ref 0–0.4)
EOSINOPHIL NFR BLD MANUAL: 1 % (ref 0.3–6.2)
ERYTHROCYTE [DISTWIDTH] IN BLOOD BY AUTOMATED COUNT: 12.8 % (ref 12.3–15.4)
GLOBULIN UR ELPH-MCNC: 3 GM/DL
GLUCOSE SERPL-MCNC: 102 MG/DL (ref 65–99)
HCT VFR BLD AUTO: 44.3 % (ref 34–46.6)
HGB BLD-MCNC: 15 G/DL (ref 12–15.9)
LYMPHOCYTES # BLD MANUAL: 10.07 10*3/MM3 (ref 0.7–3.1)
LYMPHOCYTES NFR BLD MANUAL: 8 % (ref 5–12)
MCH RBC QN AUTO: 35.1 PG (ref 26.6–33)
MCHC RBC AUTO-ENTMCNC: 33.9 G/DL (ref 31.5–35.7)
MCV RBC AUTO: 103.7 FL (ref 79–97)
MONOCYTES # BLD: 1.15 10*3/MM3 (ref 0.1–0.9)
NEUTROPHILS # BLD AUTO: 3.02 10*3/MM3 (ref 1.7–7)
NEUTROPHILS NFR BLD MANUAL: 21 % (ref 42.7–76)
PLATELET # BLD AUTO: 162 10*3/MM3 (ref 140–450)
PMV BLD AUTO: 9.9 FL (ref 6–12)
POTASSIUM SERPL-SCNC: 4.4 MMOL/L (ref 3.5–5.2)
PROT SERPL-MCNC: 7.2 G/DL (ref 6–8.5)
RBC # BLD AUTO: 4.27 10*6/MM3 (ref 3.77–5.28)
RBC MORPH BLD: NORMAL
SMUDGE CELLS BLD QL SMEAR: ABNORMAL
SODIUM SERPL-SCNC: 142 MMOL/L (ref 136–145)
VARIANT LYMPHS NFR BLD MANUAL: 70 % (ref 19.6–45.3)
WBC NRBC COR # BLD AUTO: 14.38 10*3/MM3 (ref 3.4–10.8)

## 2024-08-23 PROCEDURE — 25510000001 IOPAMIDOL 61 % SOLUTION: Performed by: INTERNAL MEDICINE

## 2024-08-23 PROCEDURE — 80053 COMPREHEN METABOLIC PANEL: CPT | Performed by: INTERNAL MEDICINE

## 2024-08-23 PROCEDURE — 74177 CT ABD & PELVIS W/CONTRAST: CPT

## 2024-08-23 PROCEDURE — 0 DIATRIZOATE MEGLUMINE & SODIUM PER 1 ML: Performed by: INTERNAL MEDICINE

## 2024-08-23 PROCEDURE — 84165 PROTEIN E-PHORESIS SERUM: CPT | Performed by: INTERNAL MEDICINE

## 2024-08-23 PROCEDURE — 36415 COLL VENOUS BLD VENIPUNCTURE: CPT

## 2024-08-23 PROCEDURE — 82784 ASSAY IGA/IGD/IGG/IGM EACH: CPT | Performed by: INTERNAL MEDICINE

## 2024-08-23 PROCEDURE — 83521 IG LIGHT CHAINS FREE EACH: CPT | Performed by: INTERNAL MEDICINE

## 2024-08-23 PROCEDURE — 85007 BL SMEAR W/DIFF WBC COUNT: CPT

## 2024-08-23 PROCEDURE — 85025 COMPLETE CBC W/AUTO DIFF WBC: CPT

## 2024-08-23 PROCEDURE — 86334 IMMUNOFIX E-PHORESIS SERUM: CPT | Performed by: INTERNAL MEDICINE

## 2024-08-23 PROCEDURE — 71260 CT THORAX DX C+: CPT

## 2024-08-23 RX ADMIN — DIATRIZOATE MEGLUMINE AND DIATRIZOATE SODIUM 30 ML: 660; 100 LIQUID ORAL; RECTAL at 09:50

## 2024-08-23 RX ADMIN — IOPAMIDOL 85 ML: 612 INJECTION, SOLUTION INTRAVENOUS at 11:11

## 2024-08-26 LAB
ALBUMIN SERPL ELPH-MCNC: 3.8 G/DL (ref 2.9–4.4)
ALBUMIN/GLOB SERPL: 1.4 {RATIO} (ref 0.7–1.7)
ALPHA1 GLOB SERPL ELPH-MCNC: 0.2 G/DL (ref 0–0.4)
ALPHA2 GLOB SERPL ELPH-MCNC: 0.8 G/DL (ref 0.4–1)
B-GLOBULIN SERPL ELPH-MCNC: 1 G/DL (ref 0.7–1.3)
GAMMA GLOB SERPL ELPH-MCNC: 0.7 G/DL (ref 0.4–1.8)
GLOBULIN SER-MCNC: 2.8 G/DL (ref 2.2–3.9)
IGA SERPL-MCNC: 116 MG/DL (ref 64–422)
IGG SERPL-MCNC: 853 MG/DL (ref 586–1602)
IGM SERPL-MCNC: 12 MG/DL (ref 26–217)
INTERPRETATION SERPL IEP-IMP: ABNORMAL
KAPPA LC FREE SER-MCNC: 15.6 MG/L (ref 3.3–19.4)
KAPPA LC FREE/LAMBDA FREE SER: 0.14 {RATIO} (ref 0.26–1.65)
LABORATORY COMMENT REPORT: ABNORMAL
LAMBDA LC FREE SERPL-MCNC: 108 MG/L (ref 5.7–26.3)
M PROTEIN SERPL ELPH-MCNC: ABNORMAL G/DL
PROT SERPL-MCNC: 6.6 G/DL (ref 6–8.5)

## 2024-08-29 NOTE — PROGRESS NOTES
.     REASONS FOR FOLLOWUP: [facility]    HISTORY OF PRESENT ILLNESS:  The patient is a 84 y.o. year old female  who is here for follow-up with the above-mentioned history.    Feeling fine.  No significant problems with fatigue.  No fever, chills, weight loss, night sweats.  No unusual areas of pain.    Past Medical History:   Diagnosis Date    Arthritis     Bacterial vaginitis     Bronchitis     Colon polyps     Diverticulosis     Dizziness     Elevated lipids     Fatigue     GERD (gastroesophageal reflux disease)     Headache     Hematuria     History of EKG     date?; borderline    Hypertension 12/18/2022    Hypovitaminosis D     Lump, breast     Unable to remember which breast    Macrocytosis     Oral candidiasis     Osteoporosis     Postmenopausal bleeding     Suprapubic tenderness     Vaginal atrophy     Yeast vaginitis      Past Surgical History:   Procedure Laterality Date    BREAST EXCISIONAL BIOPSY Left     12-15 years ago in Dr. Hendricks's office    BREAST SURGERY Left 2003    [facility]    CATARACT EXTRACTION  2009    COLONOSCOPY  10/23/2017    Dr Broderick    DILATATION AND CURETTAGE  09/21/2015    EYE SURGERY      FOOT SURGERY Right 1999    GUM SURGERY  1997    UPPER GASTROINTESTINAL ENDOSCOPY  02/01/2021    EULALIA HERNANDEZ MD       MEDICATIONS    Current Outpatient Medications:     amLODIPine (NORVASC) 5 MG tablet, TAKE 1 TABLET BY MOUTH EVERY DAY. STOP VERAPAMIL, Disp: 30 tablet, Rfl: 0    famotidine (PEPCID) 40 MG tablet, Take 1 tablet by mouth Every Night., Disp: 90 tablet, Rfl: 0    rivaroxaban (XARELTO) 15 MG tablet, Take 1 tablet by mouth Daily., Disp: 90 tablet, Rfl: 3    sucralfate (CARAFATE) 1 g tablet, TAKE 1 TABLET BY MOUTH FOUR TIMES A DAY BEFORE MEALS, Disp: , Rfl:     triamcinolone (KENALOG) 0.1 % cream, APPLY TO LEFT HAND TWICE DAILY AS NEEDED (Patient not taking: Reported on 8/30/2024), Disp: , Rfl:     ALLERGIES:     Allergies   Allergen Reactions    Clindamycin/Lincomycin Other (See Comments)      Joint pain      Proton Pump Inhibitors Other (See Comments)     Joint pain      Pneumovax [Pneumococcal Polysaccharide Vaccine] Rash       SOCIAL HISTORY:       Social History     Socioeconomic History    Marital status:      Spouse name: Sina    Number of children: 1    Years of education: High School   Tobacco Use    Smoking status: Never     Passive exposure: Never    Smokeless tobacco: Never    Tobacco comments:     Caffeine: 1 coffee daily   Vaping Use    Vaping status: Never Used   Substance and Sexual Activity    Alcohol use: No    Drug use: No    Sexual activity: Defer         FAMILY HISTORY:  Family History   Problem Relation Age of Onset    Heart disease Mother     Heart attack Mother     Other Mother         GERD    No Known Problems Father     Breast cancer Sister         Diagnosed at age 90-91    No Known Problems Brother     No Known Problems Maternal Grandmother     No Known Problems Maternal Grandfather     No Known Problems Paternal Grandmother     No Known Problems Paternal Grandfather     No Known Problems Maternal Aunt     No Known Problems Maternal Uncle     No Known Problems Paternal Aunt     No Known Problems Paternal Uncle     Ovarian cancer Neg Hx        REVIEW OF SYSTEMS:  Review of Systems   Constitutional:  Negative for activity change.   HENT:  Negative for nosebleeds and trouble swallowing.    Respiratory:  Negative for shortness of breath and wheezing.    Cardiovascular:  Negative for chest pain and palpitations.   Gastrointestinal:  Negative for constipation, diarrhea and nausea.   Genitourinary:  Negative for dysuria and hematuria.   Musculoskeletal:  Negative for arthralgias and myalgias.   Skin:  Negative for rash and wound.   Neurological:  Negative for seizures and syncope.   Hematological:  Negative for adenopathy. Does not bruise/bleed easily.   Psychiatric/Behavioral:  Negative for confusion.             Vitals:    08/30/24 1249   BP: 144/79   Pulse: 74   Resp: 16  "  Temp: 98 °F (36.7 °C)   TempSrc: Oral   SpO2: 97%   Weight: 55.9 kg (123 lb 4.8 oz)   Height: 152.4 cm (60\")   PainSc: 0-No pain         8/30/2024    12:49 PM   Current Status   ECOG score 0        PHYSICAL EXAM:        CONSTITUTIONAL:  Vital signs reviewed.  No distress, looks comfortable.  EYES:  Conjunctiva and lids unremarkable.  PERRLA  EARS,NOSE,MOUTH,THROAT:  Ears and nose appear unremarkable.  Lips, teeth, gums appear unremarkable.  RESPIRATORY:  Normal respiratory effort.  Lungs clear to auscultation bilaterally.  CARDIOVASCULAR:  Normal S1, S2.  No murmurs rubs or gallops.  No significant lower extremity edema.  GASTROINTESTINAL: Abdomen appears unremarkable.  Nontender.  No hepatomegaly.  No splenomegaly.  LYMPHATIC: Bilateral cervical supraclavicular and axillary LAD  SKIN:  Warm.  No rashes.  PSYCHIATRIC:  Normal judgment and insight.  Normal mood and affect.         RECENT LABS:        WBC   Date/Time Value Ref Range Status   08/23/2024 10:17 AM 14.38 (H) 3.40 - 10.80 10*3/mm3 Final   02/02/2023 01:04 PM 10.6 3.4 - 10.8 x10E3/uL Final     Hemoglobin   Date/Time Value Ref Range Status   08/23/2024 10:17 AM 15.0 12.0 - 15.9 g/dL Final     Platelets   Date/Time Value Ref Range Status   08/23/2024 10:17  140 - 450 10*3/mm3 Final       Assessment & Plan   There are no diagnoses linked to this encounter.      Tessie Mckeon   *  Small lymphocytic lymphoma   T p53 deletion and 13 q. deletion.    Progressive LAD.  Dr. Roque has discussed Rituxan or Calquence but patient reluctant to begin.  CT CAP 8/23/2024, from 1/24/2024: Worsened LAD.  Example: Right axillary node 4 cm from 3.2 cm.  Subpectoral node 2.4 cm from 1.7 cm.  Left axillary node 3.5 cm from 2.6 cm.  Left subpectoral node 1.8 cm from 1.1 cm.  Aortocaval node 2.1 cm from 1.6 cm.  Left pelvic sidewall node 3.7 cm from 2.9 cm.    [facility] preferred regimens include TP53 mutation:  Acalabrutinib +/- Obinutuzumab  Venetoclax + " Obinutuzumab  Zanubrutinib  (Treanda Rituxan is not recommended since deletion 17P/TP53 mutation is associated with low response rates).  8/30/2024: Initial visit with me (prior patient of Dr. Kirk).  I told her she does have some progression of LAD in multiple areas but is asymptomatic otherwise and labs look okay and therefore recommended continued observation.  She was very pleased with this.  Bilateral cervical supraclavicular and axillary LAD    *Atrial fibrillation, on Xarelto through cardiology  Xarelto can increase the risk of bleeding which can cause anemia.  Monitor.    Plan  Has been having CT about every 6 months  MD 6 months.  1 week prior: CT neck, chest, abdomen, pelvis, without IV or oral contrast, CBC, CMP  (Creatinine has been around 1.2 and this 84-year-old.  Therefore, avoid CT IV contrast)  (Prior diarrhea with oral contrast)    I am following her longitudinally.

## 2024-08-30 ENCOUNTER — OFFICE VISIT (OUTPATIENT)
Dept: ONCOLOGY | Facility: CLINIC | Age: 84
End: 2024-08-30
Payer: MEDICARE

## 2024-08-30 VITALS
HEIGHT: 60 IN | TEMPERATURE: 98 F | BODY MASS INDEX: 24.21 KG/M2 | SYSTOLIC BLOOD PRESSURE: 144 MMHG | WEIGHT: 123.3 LBS | OXYGEN SATURATION: 97 % | HEART RATE: 74 BPM | RESPIRATION RATE: 16 BRPM | DIASTOLIC BLOOD PRESSURE: 79 MMHG

## 2024-08-30 DIAGNOSIS — D72.820 MONOCLONAL B-CELL LYMPHOCYTOSIS: ICD-10-CM

## 2024-08-30 DIAGNOSIS — C83.00 SMALL LYMPHOCYTIC LYMPHOMA: Primary | ICD-10-CM

## 2024-09-19 RX ORDER — AMLODIPINE BESYLATE 5 MG/1
TABLET ORAL
Qty: 90 TABLET | Refills: 2 | Status: SHIPPED | OUTPATIENT
Start: 2024-09-19

## 2024-09-27 ENCOUNTER — TELEPHONE (OUTPATIENT)
Dept: ONCOLOGY | Facility: CLINIC | Age: 84
End: 2024-09-27
Payer: MEDICARE

## 2024-10-02 ENCOUNTER — OFFICE VISIT (OUTPATIENT)
Dept: CARDIOLOGY | Facility: CLINIC | Age: 84
End: 2024-10-02
Payer: MEDICARE

## 2024-10-02 ENCOUNTER — TELEPHONE (OUTPATIENT)
Dept: CARDIOLOGY | Facility: CLINIC | Age: 84
End: 2024-10-02

## 2024-10-02 VITALS
DIASTOLIC BLOOD PRESSURE: 80 MMHG | HEART RATE: 71 BPM | RESPIRATION RATE: 16 BRPM | HEIGHT: 60 IN | OXYGEN SATURATION: 98 % | WEIGHT: 124 LBS | SYSTOLIC BLOOD PRESSURE: 128 MMHG | BODY MASS INDEX: 24.35 KG/M2

## 2024-10-02 DIAGNOSIS — I48.0 PAROXYSMAL ATRIAL FIBRILLATION: Primary | ICD-10-CM

## 2024-10-02 DIAGNOSIS — C83.00 SMALL LYMPHOCYTIC LYMPHOMA: ICD-10-CM

## 2024-10-02 DIAGNOSIS — I10 PRIMARY HYPERTENSION: ICD-10-CM

## 2024-10-02 PROCEDURE — 99213 OFFICE O/P EST LOW 20 MIN: CPT | Performed by: INTERNAL MEDICINE

## 2024-10-02 RX ORDER — FLUTICASONE PROPIONATE 50 UG/1
1 SPRAY, METERED NASAL DAILY
COMMUNITY

## 2024-10-02 RX ORDER — FOLIC ACID 1 MG/1
1 TABLET ORAL DAILY
COMMUNITY

## 2024-10-02 RX ORDER — LANOLIN ALCOHOL/MO/W.PET/CERES
1000 CREAM (GRAM) TOPICAL DAILY
COMMUNITY

## 2024-10-02 NOTE — PROGRESS NOTES
Date of Office Visit: 10/02/2024  Encounter Provider: Sandi Evans MD  Place of Service: [facility] CARDIOLOGY  Patient Name: Tessie Mckeon  :1940    Chief complaint  Cardio oncology care, atrial fibrillation    History of Present Illness  Patient is a 83-year-old female with hypertension, mild renal insufficiency (followed by Dr. 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  She converted to sinus rhythm while in ER.  It was recommended she take Eliquis but not pursue this due to financial constraints.  She was seen in our office in 2023.  Echocardiogram showed an ejection fraction of 65%, normal diastolic function, normal valve function and no significant pulmonary hypertension and negative saline study.  2-week Zio patch showed several months of nonspecific atrial tachycardia with atrial fibrillation occurring less than 1% total monitor time longest being 25 minutes.  She subsequently started Xarelto and renal dose.  She continued to verapamil.  Patient had a follow-up CT scan on 2024 that showed slight increase in lymphadenopathy and there was discussion of possibly starting Calquence.  Review however patient was reluctant to consider this and opted to pursue repeat imaging in 6 months.    Since last visit patient remains active denies any chest pain shortness of breath palpitations syncope near syncope.  She remains in sinus rhythm.  She has not had any falls or bleeds and she remains very active.      Past Medical History:   Diagnosis Date    Arthritis     Bacterial vaginitis     Bronchitis     Colon polyps     Diverticulosis     Dizziness     Elevated lipids     Fatigue     GERD (gastroesophageal reflux disease)     Headache     Hematuria     History of EKG     date?; borderline    Hypertension 2022     Hypovitaminosis D     Lump, breast     Unable to remember which breast    Macrocytosis     Oral candidiasis     Osteoporosis     Postmenopausal bleeding     Suprapubic tenderness     Vaginal atrophy     Yeast vaginitis      Past Surgical History:   Procedure Laterality Date    BREAST EXCISIONAL BIOPSY Left     12-15 years ago in Dr. Hendricks's office    BRE[facility]GERY Left 2003    [facility]    CATARACT EXTRACTION  2009    COLONOSCOPY  10/23/2017    Dr Broderick    DILATATION AND CURETTAGE  09/21/2015    EYE SURGERY      FOOT SURGERY Right 1999    GUM SURGERY  1997    UPPER GASTROINTESTINAL ENDOSCOPY  02/01/2021    EULALIA HERNANDEZ MD     Outpatient Medications Prior to Visit   Medication Sig Dispense Refill    amLODIPine (NORVASC) 5 MG tablet TAKE 1 TABLET BY MOUTH EVERY DAY. STOP VERAPAMIL 90 tablet 2    famotidine (PEPCID) 40 MG tablet Take 1 tablet by mouth Every Night. 90 tablet 0    fluticasone (FLONASE) 50 MCG/ACT nasal spray Administer 1 spray into the nostril(s) as directed by provider Daily.      folic acid (FOLVITE) 1 MG tablet Take 1 tablet by mouth Daily.      rivaroxaban (XARELTO) 15 MG tablet Take 1 tablet by mouth Daily. 90 tablet 3    sucralfate (CARAFATE) 1 g tablet TAKE 1 TABLET BY MOUTH FOUR TIMES A DAY BEFORE MEALS      triamcinolone (KENALOG) 0.1 % cream As Needed.      vitamin B-12 (CYANOCOBALAMIN) 1000 MCG tablet Take 1 tablet by mouth Daily.       No facility-administered medications prior to visit.       Allergies as of 10/02/2024 - Reviewed 10/02/2024   Allergen Reaction Noted    Clindamycin/lincomycin Other (See Comments) 07/13/2017    Proton pump inhibitors Other (See Comments) 07/13/2017    Pneumovax [pneumococcal polysaccharide vaccine] Rash 12/21/2022     Social History     Socioeconomic History    Marital status:      Spouse name: Sina    Number of children: 1    Years of education: High School   Tobacco Use    Smoking status: Never     Passive exposure: Never    Smokeless tobacco: Never     "Tobacco comments:     Caffeine: 1 coffee daily   Vaping Use    Vaping status: Never Used   Substance and Sexual Activity    Alcohol use: No    Drug use: No    Sexual activity: Defer     Family History   Problem Relation Age of Onset    Heart disease Mother     Heart attack Mother     Other Mother         GERD    No Known Problems Father     Breast cancer Sister         Diagnosed at age 90-91    No Known Problems Brother     No Known Problems Maternal Grandmother     No Known Problems Maternal Grandfather     No Known Problems Paternal Grandmother     No Known Problems Paternal Grandfather     No Known Problems Maternal Aunt     No Known Problems Maternal Uncle     No Known Problems Paternal Aunt     No Known Problems Paternal Uncle     Ovarian cancer Neg Hx      Review of Systems   Constitutional: Negative for chills, fever, weight gain and weight loss.   Cardiovascular:  Negative for leg swelling.   Respiratory:  Negative for cough, snoring and wheezing.    Hematologic/Lymphatic: Negative for bleeding problem. Does not bruise/bleed easily.   Skin:  Negative for color change.   Musculoskeletal:  Negative for falls, joint pain and myalgias.   Gastrointestinal:  Negative for melena.   Genitourinary:  Negative for hematuria.   Neurological:  Negative for excessive daytime sleepiness.   Psychiatric/Behavioral:  Negative for depression. The patient is not nervous/anxious.         Objective:     Vitals:    10/02/24 1159   BP: 128/80   BP Location: Right arm   Patient Position: Sitting   Cuff Size: Adult   Pulse: 71   Resp: 16   SpO2: 98%   Weight: 56.2 kg (124 lb)   Height: 152.4 cm (60\")     Body mass index is 24.22 kg/m².    Vitals reviewed.   Constitutional:       Appearance: Well-developed.   Eyes:      General: No scleral icterus.        Right eye: No discharge.      Conjunctiva/sclera: Conjunctivae normal.      Pupils: Pupils are equal, round, and reactive to light.   HENT:      Head: Normocephalic.      Nose: Nose " normal.   Neck:      Thyroid: No thyromegaly.      Vascular: No JVD.   Pulmonary:      Effort: Pulmonary effort is normal. No respiratory distress.      Breath sounds: Normal breath sounds. No wheezing. No rales.   Cardiovascular:      Normal rate. Regular rhythm. Normal S1.      Murmurs: There is no murmur.      No gallop.    Pulses:     Intact distal pulses.      Carotid: 2+ bilaterally.     Radial: 2+ bilaterally.     Femoral: 2+ bilaterally.     Popliteal: 2+ bilaterally.     Dorsalis pedis: 2+ bilaterally.     Posterior tibial: 2+ bilaterally.  Edema:     Peripheral edema absent.   Abdominal:      General: Bowel sounds are normal. There is no distension.      Palpations: Abdomen is soft.      Tenderness: There is no abdominal tenderness. There is no rebound.   Musculoskeletal: Normal range of motion.         General: No tenderness.      Cervical back: Normal range of motion and neck supple. Skin:     General: Skin is warm and dry.      Findings: No erythema or rash.   Neurological:      Mental Status: Alert and oriented to person, place, and time.   Psychiatric:         Behavior: Behavior normal.         Thought Content: Thought content normal.         Judgment: Judgment normal.       Lab Review:   Lab Results - Last 18 Months   Lab Units 09/12/24  1319 08/23/24  1017 05/10/24  1302 02/16/24  1238   WBC K/uL 14.4* 14.38* 10.17 8.22   RBC x10(6)/uL 3.8 4.27 4.12 4.27   HEMOGLOBIN Gram/dL 13.8 15.0 14.4 14.6   HEMATOCRIT % 40.8 44.3 41.2 43.7   MCV fL 107.5* 103.7* 100.0* 102.3*   MCH pg 36.4* 35.1* 35.0* 34.2*   MCHC Gram/dL 33.8 33.9 35.0 33.4   RDW % 13.9 12.8 12.4 13.1   PLATELETS 10*3/mm3  --  162 152 174   EXTERNAL PLATELETS x10(3)/uL 197  --   --   --    NEUTROPHIL % %  --   --  31.4* 35.3*   LYMPHOCYTE % %  --   --  63.6* 58.3*   MONOCYTES % %  --   --  3.5* 4.4*   EOSINOPHIL % %  --   --  0.5 0.9   BASOPHIL % %  --   --  0.4 0.5   NEUTROS ABS 10*3/mm3  --  3.02 3.19 2.91   LYMPHS ABS 10*3/mm3  --   --   6.47* 4.79*   MONOS ABS 10*3/mm3  --   --  0.36 0.36   EOS ABS 10*3/mm3  --  0.14 0.05 0.07   BASOS ABS 10*3/mm3  --   --  0.04 0.04   RDW-SD fl  --  49.4 46.1 49.9   MPV fL  --  9.9 10.2 10.4       Lab Results - Last 18 Months   Lab Units 08/23/24  1017 02/16/24  1238   GLUCOSE mg/dL 102* 104*   BUN mg/dL 19 23   CREATININE mg/dL 1.25* 1.14*   SODIUM mmol/L 142 142   POTASSIUM mmol/L 4.4 4.4   CHLORIDE mmol/L 107 106   CO2 mmol/L 26.2 26.4   CALCIUM mg/dL 10.0 9.9   TOTAL PROTEIN g/dL 7.2 6.9   ALBUMIN g/dL 4.2  3.8 4.4   ALT (SGPT) U/L 10 12   AST (SGOT) U/L 16 15   ALK PHOS U/L 58 53   BILIRUBIN mg/dL 0.9 0.8   GLOBULIN gm/dL 3.0 2.5   A/G RATIO g/dL 1.4 1.8   BUN / CREAT RATIO  15.2 20.2   ANION GAP mmol/L 8.8 9.6   EGFR mL/min/1.73 42.1* 47.3*     Lab Results - Last 18 Months   Lab Units 09/12/24  1319   HDL CHOL mg/dL 48   LDL CHOL mg/dL 121.0     Lab Results - Last 18 Months   Lab Units 12/29/23  1242   PROBNP pg/mL 355.0     Lab Results - Last 18 Months   Lab Units 12/29/23  1242   TSH uIU/mL 2.360       ECG 12 Lead    Date/Time: 10/3/2024 11:29 PM  Performed by: Sandi Evans MD    Authorized by: Sandi Evans MD  Comparison: compared with previous ECG   Similar to previous ECG  Rhythm: sinus rhythm  Other findings: non-specific ST-T wave changes and poor R wave progression    Clinical impression: abnormal EKG        Assessment:       Diagnosis Plan   1. Paroxysmal atrial fibrillation  ECG 12 Lead      2. Small lymphocytic lymphoma        3. Primary hypertension          Plan:       1.  Paroxysmal atrial fibrillation.  [id number] score 4.  She is tolerating Xarelto and remains on verapamil.  She is in sinus rhythm.  Therapy with Calquence was considered with increased risk for atrial fibrillation.  However benefits are greater than the risks however patient declined.  She is currently not on therapy for lymphoma  2.  Hypertension controlled  3.  Dizziness and near syncope.   4.  Renal insufficiency.   Followed by Dr. Saucedo.  Appears to be stable  5.  Lymphoma, slight increase in adenopathy on recent CT scans.  Has opted to not pursue treatment but pursue imaging strategy in 6 months.  6.  Residual dyspnea.  Resolved      Time Spent: I spent 25 minutes caring for Tessie on this date of service. This time includes time spent by me in the following activities: preparing for the visit, reviewing tests, obtaining and/or reviewing a separately obtained history, performing a medically appropriate examination and/or evaluation, counseling and educating the patient/family/caregiver, documenting information in the medical record, and independently interpreting results and communicating that information with the patient/family/caregiver.   I spent 1 minutes on the separately reported service of ECG. This time is not included in the time used to support the E/M service also reported today.        Your medication list            Accurate as of October 2, 2024 11:59 PM. If you have any questions, ask your nurse or doctor.                CONTINUE taking these medications        Instructions Last Dose Given Next Dose Due   amLODIPine 5 MG tablet  Commonly known as: NORVASC      TAKE 1 TABLET BY MOUTH EVERY DAY. STOP VERAPAMIL       famotidine 40 MG tablet  Commonly known as: PEPCID      Take 1 tablet by mouth Every Night.       fluticasone 50 MCG/ACT nasal spray  Commonly known as: FLONASE      Administer 1 spray into the nostril(s) as directed by provider Daily.       folic acid 1 MG tablet  Commonly known as: FOLVITE      Take 1 tablet by mouth Daily.       rivaroxaban 15 MG tablet  Commonly known as: XARELTO      Take 1 tablet by mouth Daily.       sucralfate 1 g tablet  Commonly known as: CARAFATE      TAKE 1 TABLET BY MOUTH FOUR TIMES A DAY BEFORE MEALS       triamcinolone 0.1 % cream  Commonly known as: KENALOG      As Needed.       vitamin B-12 1000 MCG tablet  Commonly known as: CYANOCOBALAMIN      Take 1 tablet by  mouth Daily.                Patient is no longer taking -.  I corrected the med list to reflect this.  I did not stop these medications.      Dictated utilizing Dragon dictation

## 2024-10-03 PROCEDURE — 93000 ELECTROCARDIOGRAM COMPLETE: CPT | Performed by: INTERNAL MEDICINE

## 2024-10-04 NOTE — TELEPHONE ENCOUNTER
Spoke with pt.  She stated that it is only the first 3 fills because she has a deducible to meet.  She will look into the new plans to find one that doesn't have a deducible. She will call if any questions.   (Done)

## 2025-02-28 ENCOUNTER — LAB (OUTPATIENT)
Dept: OTHER | Facility: HOSPITAL | Age: 85
End: 2025-02-28
Payer: MEDICARE

## 2025-02-28 ENCOUNTER — HOSPITAL ENCOUNTER (OUTPATIENT)
Dept: CT IMAGING | Facility: HOSPITAL | Age: 85
Discharge: HOME | End: 2025-02-28
Payer: MEDICARE

## 2025-02-28 DIAGNOSIS — C83.00 SMALL LYMPHOCYTIC LYMPHOMA: ICD-10-CM

## 2025-02-28 DIAGNOSIS — D72.820 MONOCLONAL B-CELL LYMPHOCYTOSIS: ICD-10-CM

## 2025-02-28 LAB
ALBUMIN SERPL-MCNC: 3.7 G/DL (ref 3.5–5.2)
ALBUMIN/GLOB SERPL: 1.3 G/DL
ALP SERPL-CCNC: 48 U/L (ref 39–117)
ALT SERPL W P-5'-P-CCNC: 9 U/L (ref 1–33)
ANION GAP SERPL CALCULATED.3IONS-SCNC: 10.5 MMOL/L (ref 5–15)
AST SERPL-CCNC: 15 U/L (ref 1–32)
BILIRUB SERPL-MCNC: 0.9 MG/DL (ref 0–1.2)
BUN SERPL-MCNC: 24 MG/DL (ref 8–23)
BUN/CREAT SERPL: 21.4 (ref 7–25)
CALCIUM SPEC-SCNC: 9.5 MG/DL (ref 8.6–10.5)
CHLORIDE SERPL-SCNC: 105 MMOL/L (ref 98–107)
CO2 SERPL-SCNC: 23.5 MMOL/L (ref 22–29)
CREAT SERPL-MCNC: 1.12 MG/DL (ref 0.57–1)
DEPRECATED RDW RBC AUTO: 57.6 FL (ref 37–54)
EGFRCR SERPLBLD CKD-EPI 2021: 48 ML/MIN/1.73
EOSINOPHIL # BLD MANUAL: 0.13 10*3/MM3 (ref 0–0.4)
EOSINOPHIL NFR BLD MANUAL: 1 % (ref 0.3–6.2)
ERYTHROCYTE [DISTWIDTH] IN BLOOD BY AUTOMATED COUNT: 14.2 % (ref 12.3–15.4)
GLOBULIN UR ELPH-MCNC: 2.8 GM/DL
GLUCOSE SERPL-MCNC: 98 MG/DL (ref 65–99)
HCT VFR BLD AUTO: 38.8 % (ref 34–46.6)
HGB BLD-MCNC: 12.7 G/DL (ref 12–15.9)
LYMPHOCYTES # BLD MANUAL: 9.15 10*3/MM3 (ref 0.7–3.1)
LYMPHOCYTES NFR BLD MANUAL: 4 % (ref 5–12)
MCH RBC QN AUTO: 36 PG (ref 26.6–33)
MCHC RBC AUTO-ENTMCNC: 32.7 G/DL (ref 31.5–35.7)
MCV RBC AUTO: 109.9 FL (ref 79–97)
MONOCYTES # BLD: 0.52 10*3/MM3 (ref 0.1–0.9)
NEUTROPHILS # BLD AUTO: 3.09 10*3/MM3 (ref 1.7–7)
NEUTROPHILS NFR BLD MANUAL: 24 % (ref 42.7–76)
PLAT MORPH BLD: NORMAL
PLATELET # BLD AUTO: 116 10*3/MM3 (ref 140–450)
PMV BLD AUTO: 9.9 FL (ref 6–12)
POTASSIUM SERPL-SCNC: 4.2 MMOL/L (ref 3.5–5.2)
PROT SERPL-MCNC: 6.5 G/DL (ref 6–8.5)
RBC # BLD AUTO: 3.53 10*6/MM3 (ref 3.77–5.28)
RBC MORPH BLD: NORMAL
SODIUM SERPL-SCNC: 139 MMOL/L (ref 136–145)
VARIANT LYMPHS NFR BLD MANUAL: 64 % (ref 19.6–45.3)
VARIANT LYMPHS NFR BLD MANUAL: 7 % (ref 0–5)
WBC MORPH BLD: NORMAL
WBC NRBC COR # BLD AUTO: 12.89 10*3/MM3 (ref 3.4–10.8)

## 2025-02-28 PROCEDURE — 85025 COMPLETE CBC W/AUTO DIFF WBC: CPT | Performed by: INTERNAL MEDICINE

## 2025-02-28 PROCEDURE — 85007 BL SMEAR W/DIFF WBC COUNT: CPT | Performed by: INTERNAL MEDICINE

## 2025-02-28 PROCEDURE — 70490 CT SOFT TISSUE NECK W/O DYE: CPT

## 2025-02-28 PROCEDURE — 71250 CT THORAX DX C-: CPT

## 2025-02-28 PROCEDURE — 80053 COMPREHEN METABOLIC PANEL: CPT | Performed by: INTERNAL MEDICINE

## 2025-02-28 PROCEDURE — 74176 CT ABD & PELVIS W/O CONTRAST: CPT

## 2025-02-28 PROCEDURE — 36415 COLL VENOUS BLD VENIPUNCTURE: CPT

## 2025-03-06 NOTE — PROGRESS NOTES
.     REASONS FOR FOLLOWUP: SLL    HISTORY OF PRESENT ILLNESS:  The patient is a 84 y.o. year old female  who is here for follow-up with the above-mentioned history.    No new problems.  Feeling okay.  No fever, chills, weight loss, night sweats    Past Medical History:   Diagnosis Date    Arthritis     Bacterial vaginitis     Bronchitis     Colon polyps     Diverticulosis     Dizziness     Elevated lipids     Fatigue     GERD (gastroesophageal reflux disease)     Headache     Hematuria     History of EKG     date?; borderline    Hypertension 12/18/2022    Hypovitaminosis D     Lump, breast     Unable to remember which breast    Macrocytosis     Oral candidiasis     Osteoporosis     Postmenopausal bleeding     Suprapubic tenderness     Vaginal atrophy     Yeast vaginitis      Past Surgical History:   Procedure Laterality Date    BREAST EXCISIONAL BIOPSY Left     12-15 years ago in Dr. Hendricks's office    BREAST SURGERY Left 2003    [facility]    CATARACT EXTRACTION  2009    COLONOSCOPY  10/23/2017    Dr Broderick    DILATATION AND CURETTAGE  09/21/2015    EYE SURGERY      FOOT SURGERY Right 1999    GUM SURGERY  1997    UPPER GASTROINTESTINAL ENDOSCOPY  02/01/2021    EULALIA HERNANDEZ MD       MEDICATIONS    Current Outpatient Medications:     amLODIPine (NORVASC) 5 MG tablet, TAKE 1 TABLET BY MOUTH EVERY DAY. STOP VERAPAMIL, Disp: 90 tablet, Rfl: 2    famotidine (PEPCID) 40 MG tablet, Take 1 tablet by mouth Every Night., Disp: 90 tablet, Rfl: 0    fluticasone (FLONASE) 50 MCG/ACT nasal spray, Administer 1 spray into the nostril(s) as directed by provider Daily., Disp: , Rfl:     folic acid (FOLVITE) 1 MG tablet, Take 1 tablet by mouth Daily., Disp: , Rfl:     rivaroxaban (XARELTO) 15 MG tablet, Take 1 tablet by mouth Daily., Disp: 90 tablet, Rfl: 3    triamcinolone (KENALOG) 0.1 % cream, As Needed., Disp: , Rfl:     vitamin B-12 (CYANOCOBALAMIN) 1000 MCG tablet, Take 1 tablet by mouth Daily., Disp: , Rfl:     sucralfate  (CARAFATE) 1 g tablet, TAKE 1 TABLET BY MOUTH FOUR TIMES A DAY BEFORE MEALS (Patient not taking: Reported on 3/7/2025), Disp: , Rfl:     ALLERGIES:     Allergies   Allergen Reactions    Clindamycin/Lincomycin Other (See Comments)     Joint pain      Proton Pump Inhibitors Other (See Comments)     Joint pain      Pneumovax [Pneumococcal Polysaccharide Vaccine] Rash       SOCIAL HISTORY:       Social History     Socioeconomic History    Marital status:      Spouse name: Sina    Number of children: 1    Years of education: High School   Tobacco Use    Smoking status: Never     Passive exposure: Never    Smokeless tobacco: Never    Tobacco comments:     Caffeine: 1 coffee daily   Vaping Use    Vaping status: Never Used   Substance and Sexual Activity    Alcohol use: No    Drug use: No    Sexual activity: Defer         FAMILY HISTORY:  Family History   Problem Relation Age of Onset    Heart disease Mother     Heart attack Mother     Other Mother         GERD    No Known Problems Father     Breast cancer Sister         Diagnosed at age 90-91    No Known Problems Brother     No Known Problems Maternal Grandmother     No Known Problems Maternal Grandfather     No Known Problems Paternal Grandmother     No Known Problems Paternal Grandfather     No Known Problems Maternal Aunt     No Known Problems Maternal Uncle     No Known Problems Paternal Aunt     No Known Problems Paternal Uncle     Ovarian cancer Neg Hx        REVIEW OF SYSTEMS:  Review of Systems   Constitutional:  Negative for activity change.   HENT:  Negative for nosebleeds and trouble swallowing.    Respiratory:  Negative for shortness of breath and wheezing.    Cardiovascular:  Negative for chest pain and palpitations.   Gastrointestinal:  Negative for constipation, diarrhea and nausea.   Genitourinary:  Negative for dysuria and hematuria.   Musculoskeletal:  Negative for arthralgias and myalgias.   Skin:  Negative for rash and wound.   Neurological:   "Negative for seizures and syncope.   Hematological:  Negative for adenopathy. Does not bruise/bleed easily.   Psychiatric/Behavioral:  Negative for confusion.             Vitals:    03/07/25 1030   BP: 153/83   Pulse: 78   Resp: 16   Temp: 98.2 °F (36.8 °C)   TempSrc: Oral   SpO2: 98%   Weight: 53.2 kg (117 lb 3.2 oz)   Height: 152 cm (59.84\")   PainSc: 0-No pain           3/7/2025    10:31 AM   Current Status   ECOG score 0        PHYSICAL EXAM:        CONSTITUTIONAL:  Vital signs reviewed.  No distress, looks comfortable.  EYES:  Conjunctiva and lids unremarkable.  PERRLA  EARS,NOSE,MOUTH,THROAT:  Ears and nose appear unremarkable.  Lips, teeth, gums appear unremarkable.  RESPIRATORY:  Normal respiratory effort.  Lungs clear to auscultation bilaterally.  CARDIOVASCULAR:  Normal S1, S2.  No murmurs rubs or gallops.  No significant lower extremity edema.  GASTROINTESTINAL: Abdomen appears unremarkable.  Nontender.  No hepatomegaly.  No splenomegaly.  LYMPHATIC: Bilateral cervical, supraclavicular, axillary LAD  SKIN:  Warm.  No rashes.  PSYCHIATRIC:  Normal judgment and insight.  Normal mood and affect.          RECENT LABS:        WBC   Date/Time Value Ref Range Status   02/28/2025 10:25 AM 12.89 (H) 3.40 - 10.80 10*3/mm3 Final   09/12/2024 01:19 PM 14.4 (H) 3.8 - 10.8 K/uL Final     Hemoglobin   Date/Time Value Ref Range Status   02/28/2025 10:25 AM 12.7 12.0 - 15.9 g/dL Final   09/12/2024 01:19 PM 13.8 11.5 - 15.5 Gram/dL Final     Platelets   Date/Time Value Ref Range Status   02/28/2025 10:25  (L) 140 - 450 10*3/mm3 Final     External Platelets   Date/Time Value Ref Range Status   09/12/2024 01:19  140 - 400 x10(3)/uL Final       Assessment & Plan   Small lymphocytic lymphoma  - CBC & Differential  - Comprehensive Metabolic Panel  - Immature Platelet Fraction        Tessie Mckeon   *  Small lymphocytic lymphoma   T p53 deletion and 13 q. deletion.    Progressive LAD.  Dr. Roque has discussed " Rituxan or Calquence but patient reluctant to begin.  CT CAP 8/23/2024, from 1/24/2024: Worsened LAD.  Example: Right axillary node 4 cm from 3.2 cm.  Subpectoral node 2.4 cm from 1.7 cm.  Left axillary node 3.5 cm from 2.6 cm.  Left subpectoral node 1.8 cm from 1.1 cm.  Aortocaval node 2.1 cm from 1.6 cm.  Left pelvic sidewall node 3.7 cm from 2.9 cm.    [facility] preferred regimens include TP53 mutation:  Acalabrutinib +/- Obinutuzumab  Venetoclax + Obinutuzumab  Zanubrutinib  (Treanda Rituxan is not recommended since deletion 17P/TP53 mutation is associated with low response rates).  8/30/2024: Initial visit with me (prior patient of Dr. Kirk).  I told her she does have some progression of LAD in multiple areas but is asymptomatic otherwise and labs look okay and therefore recommended continued observation.  She was very pleased with this.  Bilateral cervical supraclavicular and axillary LAD  CT NCAP, 2/28/2025: Progression.  Most noted as large left lateral pelvic node mass adjacent to the left acetabulum 6.7 x 2.8 cm from 5.1 x 1.7 cm on 1/24/2024  3/7/2025: Still has bilateral cervical, supraclavicular, axillary LAD.  Asymptomatic.  Recommend continued observation.    *Atrial fibrillation, on Xarelto through cardiology  Xarelto can increase the risk of bleeding which can cause anemia.  Monitor.    *Thrombocytopenia  New issue on 2/28/2025, .  Previously normal    Plan  3/7/2025: Because of the new mild thrombocytopenia, I have asked her to return in 3 months with CBC CMP IPF (without a scan) and to see me  We will tentatively plan on maintaining CT NCAP without IV or oral contrast every 6 months (September, March)  CT neck, chest, abdomen, pelvis, without IV or oral contrast, CBC, CMP  (Creatinine has been around 1.2 and this 84-year-old.  Therefore, avoid CT IV contrast)  (Prior diarrhea with oral contrast)    I am following her longitudinally.    42 minutes.  Total time.  Same day.

## 2025-03-07 ENCOUNTER — OFFICE VISIT (OUTPATIENT)
Dept: ONCOLOGY | Facility: CLINIC | Age: 85
End: 2025-03-07
Payer: MEDICARE

## 2025-03-07 VITALS
DIASTOLIC BLOOD PRESSURE: 83 MMHG | SYSTOLIC BLOOD PRESSURE: 153 MMHG | OXYGEN SATURATION: 98 % | BODY MASS INDEX: 23.01 KG/M2 | TEMPERATURE: 98.2 F | WEIGHT: 117.2 LBS | RESPIRATION RATE: 16 BRPM | HEIGHT: 60 IN | HEART RATE: 78 BPM

## 2025-03-07 DIAGNOSIS — C83.00 SMALL LYMPHOCYTIC LYMPHOMA: Primary | ICD-10-CM

## 2025-04-08 ENCOUNTER — TELEPHONE (OUTPATIENT)
Dept: CARDIOLOGY | Age: 85
End: 2025-04-08
Payer: MEDICARE

## 2025-04-08 NOTE — TELEPHONE ENCOUNTER
Pt called and going to use a [facility] to get her Xarelto 15mg.  She is going to have them fax us a form.    Pt wants to verify that Dr Evans is ok with this.

## 2025-04-10 NOTE — TELEPHONE ENCOUNTER
Spoke with pt.  Verbalized understanding.  No other questions,  She will think about it and let us know if she chooses.  (Done)

## 2025-04-15 NOTE — PROGRESS NOTES
Date of Office Visit: 2025  Encounter Provider: JAZZY Jc  Place of Service: [facility] CARDIOLOG[facility]  Patient Name: Tessie Mckeon  :1940    Chief complaint  Cardio oncology care, atrial fibrillation     History of Present Illness  Patient is a 84 y.o. year old female  patient of Dr. Evans. Past medical history includes hypertension, mild renal insufficiency (followed by Dr. 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 She converted to sinus rhythm while in ER. It was recommended she take Eliquis but not pursue this due to financial constraints. She was seen in our office in 2023. Echocardiogram showed an ejection fraction of 65%, normal diastolic function, normal valve function and no significant pulmonary hypertension and negative saline study. 2-week Zio patch showed several months of nonspecific atrial tachycardia with atrial fibrillation occurring less than 1% total monitor time longest being 25 minutes. She subsequently started Xarelto and renal dose. She continued to verapamil but this was stopped after Holter monitor in summer 2024 showed pauses greater than 4 seconds and atrial fibrillation burden of less than 5%. Patient had a follow-up CT scan on 2024 that showed slight increase in lymphadenopathy and there was discussion of possibly starting Calquence.  She has been under surveillance and has not started any new medications recently.    Interval history  Patient presents today for routine follow-up.  I will visit with her today and have reviewed her medical record.  Since last visit she denies palpitations, shortness of breath, edema, dizziness, chest pain or chest pressure, fatigue, syncope or presyncope.  She does not routinely exercise but is active at home and denies exertional symptoms.   Blood pressure in office today slightly elevated but she reports readings at home are better and overall controlled and less than 130/80 on average.  She has complaints of tachycardia at times, but this occurs rarely (<1 episode/month).  She is not currently on any AV jacinda blocker therapy due to pauses previously.    Past Medical History:   Diagnosis Date    Arthritis     Bacterial vaginitis     Bronchitis     Colon polyps     Diverticulosis     Dizziness     Elevated lipids     Fatigue     GERD (gastroesophageal reflux disease)     Headache     Hematuria     History of EKG     date?; borderline    Hypertension 12/18/2022    Hypovitaminosis D     Lump, breast     Unable to remember which breast    Macrocytosis     Oral candidiasis     Osteoporosis     Postmenopausal bleeding     Suprapubic tenderness     Vaginal atrophy     Yeast vaginitis      Past Surgical History:   Procedure Laterality Date    BREAST EXCISIONAL BIOPSY Left     12-15 years ago in Dr. Hendricks's office    BRE[facility]GERY Left 2003    [facility]    CATARACT EXTRACTION  2009    COLONOSCOPY  10/23/2017    Dr Broderick    DILATATION AND CURETTAGE  09/21/2015    EYE SURGERY      FOOT SURGERY Right 1999    GUM SURGERY  1997    UPPER GASTROINTESTINAL ENDOSCOPY  02/01/2021    EULALIA HERNANDEZ MD     Outpatient Medications Prior to Visit   Medication Sig Dispense Refill    amLODIPine (NORVASC) 5 MG tablet TAKE 1 TABLET BY MOUTH EVERY DAY. STOP VERAPAMIL 90 tablet 2    famotidine (PEPCID) 40 MG tablet Take 1 tablet by mouth Every Night. 90 tablet 0    fluticasone (FLONASE) 50 MCG/ACT nasal spray Administer 1 spray into the nostril(s) as directed by provider Daily.      folic acid (FOLVITE) 1 MG tablet Take 1 tablet by mouth Daily.      rivaroxaban (XARELTO) 15 MG tablet Take 1 tablet by mouth Daily. 90 tablet 3    sucralfate (CARAFATE) 1 g tablet       triamcinolone (KENALOG) 0.1 % cream As Needed.      vitamin B-12 (CYANOCOBALAMIN) 1000 MCG tablet Take 1 tablet by mouth  "Daily.       No facility-administered medications prior to visit.       Allergies as of 04/16/2025 - Reviewed 04/16/2025   Allergen Reaction Noted    Clindamycin/lincomycin Other (See Comments) 07/13/2017    Proton pump inhibitors Other (See Comments) 07/13/2017    Pneumovax [pneumococcal polysaccharide vaccine] Rash 12/21/2022     Social History     Socioeconomic History    Marital status:      Spouse name: Sina    Number of children: 1    Years of education: High School   Tobacco Use    Smoking status: Never     Passive exposure: Never    Smokeless tobacco: Never    Tobacco comments:     Caffeine: 1 coffee daily   Vaping Use    Vaping status: Never Used   Substance and Sexual Activity    Alcohol use: No    Drug use: No    Sexual activity: Defer     Family History   Problem Relation Age of Onset    Heart disease Mother     Heart attack Mother     Other Mother         GERD    No Known Problems Father     Breast cancer Sister         Diagnosed at age 90-91    No Known Problems Brother     No Known Problems Maternal Grandmother     No Known Problems Maternal Grandfather     No Known Problems Paternal Grandmother     No Known Problems Paternal Grandfather     No Known Problems Maternal Aunt     No Known Problems Maternal Uncle     No Known Problems Paternal Aunt     No Known Problems Paternal Uncle     Ovarian cancer Neg Hx      Review of Systems   Constitutional: Negative for malaise/fatigue.   Cardiovascular:  Negative for chest pain, claudication, dyspnea on exertion, leg swelling, near-syncope, orthopnea, palpitations, paroxysmal nocturnal dyspnea and syncope.   Respiratory:  Negative for shortness of breath.    Neurological:  Negative for brief paralysis, dizziness, headaches and light-headedness.   All other systems reviewed and are negative.       Objective:     Vitals:    04/16/25 0957   BP: 136/84   Pulse: 84   Resp: 16   SpO2: 98%   Weight: 53.5 kg (118 lb)   Height: 149.9 cm (59\")     Body mass index " is 23.83 kg/m².    Vitals reviewed.   Constitutional:       General: Not in acute distress.     Appearance: Well-developed and not in distress. Not diaphoretic.   HENT:      Head: Normocephalic.   Pulmonary:      Effort: Pulmonary effort is normal. No respiratory distress.      Breath sounds: Normal breath sounds. No wheezing. No rhonchi. No rales.   Cardiovascular:      Normal rate. Regular rhythm.      Murmurs: There is no murmur.   Pulses:     Radial: 2+ bilaterally.  Edema:     Peripheral edema absent.   Skin:     General: Skin is warm and dry. There is no cyanosis.      Findings: No rash.   Neurological:      Mental Status: Alert and oriented to person, place, and time.   Psychiatric:         Behavior: Behavior normal. Behavior is cooperative.         Thought Content: Thought content normal.         Judgment: Judgment normal.       Lab Review:     Lab Results   Component Value Date     02/28/2025     08/23/2024    K 4.2 02/28/2025    K 4.4 08/23/2024     02/28/2025     08/23/2024    CO2 23.5 02/28/2025    CO2 26.2 08/23/2024    BUN 24 (H) 02/28/2025    BUN 19 08/23/2024    CREATININE 1.12 (H) 02/28/2025    CREATININE 1.25 (H) 08/23/2024    EGFRIFNONA 43 (L) 09/28/2021    EGFRIFNONA 47 (L) 09/29/2020    GLUCOSE 98 02/28/2025    GLUCOSE 102 (H) 08/23/2024    CALCIUM 9.5 02/28/2025    CALCIUM 10.0 08/23/2024    ALBUMIN 3.7 02/28/2025    ALBUMIN 4.2 08/23/2024    ALBUMIN 3.8 08/23/2024    BILITOT 0.9 02/28/2025    BILITOT 0.9 08/23/2024    AST 15 02/28/2025    AST 16 08/23/2024    ALT 9 02/28/2025    ALT 10 08/23/2024     Lab Results   Component Value Date    WBC 12.89 (H) 02/28/2025    WBC 14.4 (H) 09/12/2024    HGB 12.7 02/28/2025    HGB 13.8 09/12/2024    HCT 38.8 02/28/2025    HCT 40.8 09/12/2024    .9 (H) 02/28/2025    .5 (H) 09/12/2024     (L) 02/28/2025     09/12/2024     Lab Results   Component Value Date    PROBNP 355.0 12/29/2023     No results found  "for: \"CKTOTAL\", \"CKMB\", \"CKMBINDEX\", \"TROPONINI\", \"TROPONINT\"  Lab Results   Component Value Date    TSH 2.360 12/29/2023    TSH 1.380 06/10/2016      Lipid Panel          9/12/2024    13:19   Lipid Panel   Total Cholesterol 193.0       HDL Cholesterol 48       LDL Cholesterol  121.0          Details          This result is from an external source.                  ECG 12 Lead    Date/Time: 4/16/2025 10:18 AM  Performed by: Charissa Michele APRN    Authorized by: Charissa Michele APRN  Comparison: compared with previous ECG   Similar to previous ECG  Rhythm: sinus rhythm  Rate: normal  BPM: 84  QRS axis: normal  Other findings: low voltage        Assessment:       Diagnosis Plan   1. Paroxysmal atrial fibrillation        2. Small lymphocytic lymphoma        3. Primary hypertension        4. Near syncope        5. Renal insufficiency        6. SOMMERS (dyspnea on exertion)          Plan:       1.  Paroxysmal atrial fibrillation.  [id number] score 4.  She is tolerating Xarelto.  She is in sinus rhythm.  Now off verapamil after previous monitor showed greater than 4-second pauses.  Therapy with Calquence was considered with increased risk for atrial fibrillation.  Patient declined treatment with Calquence and is currently not on therapy for lymphoma.    2.  Hypertension controlled overall on current regimen. She will continue to monitor this and call if consistently >130/80  3.  Dizziness and near syncope.  Resolved after recovery from flu and COVID  4.  Renal insufficiency.  Followed by Dr. Clark.  Stable at last check in February 2025  5.  Lymphoma, slight increase in adenopathy on recent CT scans.  Has opted to not pursue treatment but pursue imaging strategy in 6 months.  Follows with hematology (Dr. Rainey).  6.  Residual dyspnea.  Resolved since recovering from flu and COVID.      Time Spent: I spent 30 minutes caring for Tessie on this date of service. This time includes time spent by me in the following " activities: preparing for the visit, reviewing tests, performing a medically appropriate examination and/or evaluations, counseling and educating the patient/family/caregiver, ordering medications, tests, or procedures, documenting information in the medical record, and independently interpreting results and communicating that information with the patient/family/caregiver.   I spent 1 minutes on the separately reported service of ECG. This time is not included in the time used to support the E/M service also reported today.        Your medication list            Accurate as of April 16, 2025 10:19 AM. If you have any questions, ask your nurse or doctor.                CONTINUE taking these medications        Instructions Last Dose Given Next Dose Due   amLODIPine 5 MG tablet  Commonly known as: NORVASC      TAKE 1 TABLET BY MOUTH EVERY DAY. STOP VERAPAMIL       famotidine 40 MG tablet  Commonly known as: PEPCID      Take 1 tablet by mouth Every Night.       fluticasone 50 MCG/ACT nasal spray  Commonly known as: FLONASE      Administer 1 spray into the nostril(s) as directed by provider Daily.       folic acid 1 MG tablet  Commonly known as: FOLVITE      Take 1 tablet by mouth Daily.       rivaroxaban 15 MG tablet  Commonly known as: XARELTO      Take 1 tablet by mouth Daily.       sucralfate 1 g tablet  Commonly known as: CARAFATE           triamcinolone 0.1 % cream  Commonly known as: KENALOG      As Needed.       vitamin B-12 1000 MCG tablet  Commonly known as: CYANOCOBALAMIN      Take 1 tablet by mouth Daily.                Patient is no longer taking -.  I corrected the med list to reflect this.  I did not stop these medications.    No follow-ups on file.      Dictated utilizing Dragon dictation

## 2025-04-16 ENCOUNTER — OFFICE VISIT (OUTPATIENT)
Age: 85
End: 2025-04-16
Payer: MEDICARE

## 2025-04-16 VITALS
OXYGEN SATURATION: 98 % | DIASTOLIC BLOOD PRESSURE: 84 MMHG | HEART RATE: 84 BPM | RESPIRATION RATE: 16 BRPM | SYSTOLIC BLOOD PRESSURE: 136 MMHG | WEIGHT: 118 LBS | HEIGHT: 59 IN | BODY MASS INDEX: 23.79 KG/M2

## 2025-04-16 DIAGNOSIS — I48.0 PAROXYSMAL ATRIAL FIBRILLATION: Primary | ICD-10-CM

## 2025-04-16 DIAGNOSIS — N28.9 RENAL INSUFFICIENCY: ICD-10-CM

## 2025-04-16 DIAGNOSIS — R55 NEAR SYNCOPE: ICD-10-CM

## 2025-04-16 DIAGNOSIS — R06.09 DOE (DYSPNEA ON EXERTION): ICD-10-CM

## 2025-04-16 DIAGNOSIS — C83.00 SMALL LYMPHOCYTIC LYMPHOMA: ICD-10-CM

## 2025-04-16 DIAGNOSIS — I10 PRIMARY HYPERTENSION: ICD-10-CM

## 2025-05-30 ENCOUNTER — OFFICE VISIT (OUTPATIENT)
Dept: ONCOLOGY | Facility: CLINIC | Age: 85
End: 2025-05-30
Payer: MEDICARE

## 2025-05-30 ENCOUNTER — LAB (OUTPATIENT)
Dept: OTHER | Facility: HOSPITAL | Age: 85
End: 2025-05-30
Payer: MEDICARE

## 2025-05-30 VITALS
SYSTOLIC BLOOD PRESSURE: 132 MMHG | DIASTOLIC BLOOD PRESSURE: 84 MMHG | OXYGEN SATURATION: 96 % | HEIGHT: 59 IN | BODY MASS INDEX: 23.51 KG/M2 | TEMPERATURE: 97.8 F | HEART RATE: 70 BPM | RESPIRATION RATE: 16 BRPM | WEIGHT: 116.6 LBS

## 2025-05-30 DIAGNOSIS — C83.00 SMALL LYMPHOCYTIC LYMPHOMA: ICD-10-CM

## 2025-05-30 DIAGNOSIS — C83.00 SMALL LYMPHOCYTIC LYMPHOMA: Primary | ICD-10-CM

## 2025-05-30 LAB
ALBUMIN SERPL-MCNC: 4.3 G/DL (ref 3.5–5.2)
ALBUMIN/GLOB SERPL: 1.5 G/DL
ALP SERPL-CCNC: 53 U/L (ref 39–117)
ALT SERPL W P-5'-P-CCNC: 11 U/L (ref 1–33)
ANION GAP SERPL CALCULATED.3IONS-SCNC: 10.7 MMOL/L (ref 5–15)
AST SERPL-CCNC: 16 U/L (ref 1–32)
BASOPHILS # BLD AUTO: 0.04 10*3/MM3 (ref 0–0.2)
BASOPHILS NFR BLD AUTO: 0.2 % (ref 0–1.5)
BILIRUB SERPL-MCNC: 0.7 MG/DL (ref 0–1.2)
BUN SERPL-MCNC: 18.2 MG/DL (ref 8–23)
BUN/CREAT SERPL: 16.4 (ref 7–25)
CALCIUM SPEC-SCNC: 10 MG/DL (ref 8.6–10.5)
CHLORIDE SERPL-SCNC: 107 MMOL/L (ref 98–107)
CO2 SERPL-SCNC: 25.3 MMOL/L (ref 22–29)
CREAT SERPL-MCNC: 1.11 MG/DL (ref 0.57–1)
DEPRECATED RDW RBC AUTO: 53.1 FL (ref 37–54)
EGFRCR SERPLBLD CKD-EPI 2021: 48.5 ML/MIN/1.73
EOSINOPHIL # BLD AUTO: 0.04 10*3/MM3 (ref 0–0.4)
EOSINOPHIL NFR BLD AUTO: 0.2 % (ref 0.3–6.2)
ERYTHROCYTE [DISTWIDTH] IN BLOOD BY AUTOMATED COUNT: 13.1 % (ref 12.3–15.4)
GLOBULIN UR ELPH-MCNC: 2.9 GM/DL
GLUCOSE SERPL-MCNC: 128 MG/DL (ref 65–99)
HCT VFR BLD AUTO: 39.6 % (ref 34–46.6)
HGB BLD-MCNC: 13.2 G/DL (ref 12–15.9)
IMM GRANULOCYTES # BLD AUTO: 0.01 10*3/MM3 (ref 0–0.05)
IMM GRANULOCYTES NFR BLD AUTO: 0.1 % (ref 0–0.5)
LYMPHOCYTES # BLD AUTO: 13.4 10*3/MM3 (ref 0.7–3.1)
LYMPHOCYTES NFR BLD AUTO: 83.1 % (ref 19.6–45.3)
MCH RBC QN AUTO: 36.4 PG (ref 26.6–33)
MCHC RBC AUTO-ENTMCNC: 33.3 G/DL (ref 31.5–35.7)
MCV RBC AUTO: 109.1 FL (ref 79–97)
MONOCYTES # BLD AUTO: 0.86 10*3/MM3 (ref 0.1–0.9)
MONOCYTES NFR BLD AUTO: 5.3 % (ref 5–12)
NEUTROPHILS NFR BLD AUTO: 1.78 10*3/MM3 (ref 1.7–7)
NEUTROPHILS NFR BLD AUTO: 11.1 % (ref 42.7–76)
NRBC BLD AUTO-RTO: 0 /100 WBC (ref 0–0.2)
PLAT MORPH BLD: NORMAL
PLATELET # BLD AUTO: 127 10*3/MM3 (ref 140–450)
PLATELET # BLD AUTO: 127 10*3/MM3 (ref 140–450)
PLATELETS.RETICULATED NFR BLD AUTO: 3 % (ref 0.9–6.5)
PMV BLD AUTO: 10.4 FL (ref 6–12)
POTASSIUM SERPL-SCNC: 4.3 MMOL/L (ref 3.5–5.2)
PROT SERPL-MCNC: 7.2 G/DL (ref 6–8.5)
RBC # BLD AUTO: 3.63 10*6/MM3 (ref 3.77–5.28)
RBC MORPH BLD: NORMAL
SODIUM SERPL-SCNC: 143 MMOL/L (ref 136–145)
WBC MORPH BLD: NORMAL
WBC NRBC COR # BLD AUTO: 16.13 10*3/MM3 (ref 3.4–10.8)

## 2025-05-30 PROCEDURE — 85025 COMPLETE CBC W/AUTO DIFF WBC: CPT | Performed by: INTERNAL MEDICINE

## 2025-05-30 PROCEDURE — 80053 COMPREHEN METABOLIC PANEL: CPT | Performed by: INTERNAL MEDICINE

## 2025-05-30 PROCEDURE — 85055 RETICULATED PLATELET ASSAY: CPT | Performed by: INTERNAL MEDICINE

## 2025-05-30 PROCEDURE — 36415 COLL VENOUS BLD VENIPUNCTURE: CPT

## 2025-05-30 PROCEDURE — 85007 BL SMEAR W/DIFF WBC COUNT: CPT | Performed by: INTERNAL MEDICINE

## 2025-05-30 NOTE — PROGRESS NOTES
.     REASONS FOR FOLLOWUP: SLL    HISTORY OF PRESENT ILLNESS:  The patient is a 84 y.o. year old female  who is here for follow-up with the above-mentioned history.    No new health problems.  No fever chills weight loss night sweats    Past Medical History:   Diagnosis Date    Arthritis     Bacterial vaginitis     Bronchitis     Colon polyps     Diverticulosis     Dizziness     Elevated lipids     Fatigue     GERD (gastroesophageal reflux disease)     Headache     Hematuria     History of EKG     date?; borderline    Hypertension 12/18/2022    Hypovitaminosis D     Lump, breast     Unable to remember which breast    Macrocytosis     Oral candidiasis     Osteoporosis     Postmenopausal bleeding     Suprapubic tenderness     Vaginal atrophy     Yeast vaginitis      Past Surgical History:   Procedure Laterality Date    BREAST EXCISIONAL BIOPSY Left     12-15 years ago in Dr. Hendricks's office    BREAST SURGERY Left 2003    [facility]    CATARACT EXTRACTION  2009    COLONOSCOPY  10/23/2017    Dr Broderick    DILATATION AND CURETTAGE  09/21/2015    EYE SURGERY      FOOT SURGERY Right 1999    GUM SURGERY  1997    UPPER GASTROINTESTINAL ENDOSCOPY  02/01/2021    EULALIA HERNANDEZ MD       MEDICATIONS    Current Outpatient Medications:     amLODIPine (NORVASC) 5 MG tablet, TAKE 1 TABLET BY MOUTH EVERY DAY. STOP VERAPAMIL, Disp: 90 tablet, Rfl: 2    famotidine (PEPCID) 40 MG tablet, Take 1 tablet by mouth Every Night., Disp: 90 tablet, Rfl: 0    fluticasone (FLONASE) 50 MCG/ACT nasal spray, Administer 1 spray into the nostril(s) as directed by provider Daily., Disp: , Rfl:     folic acid (FOLVITE) 1 MG tablet, Take 1 tablet by mouth Daily., Disp: , Rfl:     rivaroxaban (XARELTO) 15 MG tablet, Take 1 tablet by mouth Daily., Disp: 90 tablet, Rfl: 3    sucralfate (CARAFATE) 1 g tablet, , Disp: , Rfl:     triamcinolone (KENALOG) 0.1 % cream, As Needed., Disp: , Rfl:     vitamin B-12 (CYANOCOBALAMIN) 1000 MCG tablet, Take 1 tablet by mouth  Daily., Disp: , Rfl:     ALLERGIES:     Allergies   Allergen Reactions    Clindamycin/Lincomycin Other (See Comments)     Joint pain      Proton Pump Inhibitors Other (See Comments)     Joint pain      Pneumovax [Pneumococcal Polysaccharide Vaccine] Rash       SOCIAL HISTORY:       Social History     Socioeconomic History    Marital status:      Spouse name: Sina    Number of children: 1    Years of education: High School   Tobacco Use    Smoking status: Never     Passive exposure: Never    Smokeless tobacco: Never    Tobacco comments:     Caffeine: 1 coffee daily   Vaping Use    Vaping status: Never Used   Substance and Sexual Activity    Alcohol use: No    Drug use: No    Sexual activity: Defer         FAMILY HISTORY:  Family History   Problem Relation Age of Onset    Heart disease Mother     Heart attack Mother     Other Mother         GERD    No Known Problems Father     Breast cancer Sister         Diagnosed at age 90-91    No Known Problems Brother     No Known Problems Maternal Grandmother     No Known Problems Maternal Grandfather     No Known Problems Paternal Grandmother     No Known Problems Paternal Grandfather     No Known Problems Maternal Aunt     No Known Problems Maternal Uncle     No Known Problems Paternal Aunt     No Known Problems Paternal Uncle     Ovarian cancer Neg Hx        REVIEW OF SYSTEMS:  Review of Systems   Constitutional:  Negative for activity change.   HENT:  Negative for nosebleeds and trouble swallowing.    Respiratory:  Negative for shortness of breath and wheezing.    Cardiovascular:  Negative for chest pain and palpitations.   Gastrointestinal:  Negative for constipation, diarrhea and nausea.   Genitourinary:  Negative for dysuria and hematuria.   Musculoskeletal:  Negative for arthralgias and myalgias.   Skin:  Negative for rash and wound.   Neurological:  Negative for seizures and syncope.   Hematological:  Negative for adenopathy. Does not bruise/bleed easily.  "  Psychiatric/Behavioral:  Negative for confusion.             Vitals:    05/30/25 1324   BP: 132/84   Pulse: 70   Resp: 16   Temp: 97.8 °F (36.6 °C)   TempSrc: Temporal   SpO2: 96%   Weight: 52.9 kg (116 lb 9.6 oz)   Height: 149 cm (58.66\")   PainSc: 0-No pain           5/30/2025     1:28 PM   Current Status   ECOG score 0        PHYSICAL EXAM:        CONSTITUTIONAL:  Vital signs reviewed.  No distress, looks comfortable.  EYES:  Conjunctiva and lids unremarkable.  PERRLA  EARS,NOSE,MOUTH,THROAT:  Ears and nose appear unremarkable.  Lips, teeth, gums appear unremarkable.  RESPIRATORY:  Normal respiratory effort.  Lungs clear to auscultation bilaterally.  CARDIOVASCULAR:  Normal S1, S2.  No murmurs rubs or gallops.  No significant lower extremity edema.  GASTROINTESTINAL: Abdomen appears unremarkable.  Nontender.  No hepatomegaly.  No splenomegaly.  LYMPHATIC: Bilateral cervical supraclavicular axillary LAD  SKIN:  Warm.  No rashes.  PSYCHIATRIC:  Normal judgment and insight.  Normal mood and affect.        RECENT LABS:        WBC   Date/Time Value Ref Range Status   05/30/2025 01:23 PM 16.13 (H) 3.40 - 10.80 10*3/mm3 Final   09/12/2024 01:19 PM 14.4 (H) 3.8 - 10.8 K/uL Final     Hemoglobin   Date/Time Value Ref Range Status   05/30/2025 01:23 PM 13.2 12.0 - 15.9 g/dL Final   09/12/2024 01:19 PM 13.8 11.5 - 15.5 Gram/dL Final     Platelets   Date/Time Value Ref Range Status   05/30/2025 01:23  (L) 140 - 450 10*3/mm3 Final   05/30/2025 01:23  (L) 140 - 450 10*3/mm3 Final     External Platelets   Date/Time Value Ref Range Status   09/12/2024 01:19  140 - 400 x10(3)/uL Final       Assessment & Plan   Small lymphocytic lymphoma  - Comprehensive Metabolic Panel  - CBC & Differential  - Immature Platelet Fraction        Tsesie Mckeon   *  Small lymphocytic lymphoma   T p53 deletion and 13 q. deletion.    Progressive LAD.  Dr. Roque has discussed Rituxan or Calquence but patient reluctant to begin.  CT " CAP 8/23/2024, from 1/24/2024: Worsened LAD.  Example: Right axillary node 4 cm from 3.2 cm.  Subpectoral node 2.4 cm from 1.7 cm.  Left axillary node 3.5 cm from 2.6 cm.  Left subpectoral node 1.8 cm from 1.1 cm.  Aortocaval node 2.1 cm from 1.6 cm.  Left pelvic sidewall node 3.7 cm from 2.9 cm.    [facility] preferred regimens include TP53 mutation:  Acalabrutinib +/- Obinutuzumab  Venetoclax + Obinutuzumab  Zanubrutinib  (Treanda Rituxan is not recommended since deletion 17P/TP53 mutation is associated with low response rates).  8/30/2024: Initial visit with me (prior patient of Dr. Kirk).  I told her she does have some progression of LAD in multiple areas but is asymptomatic otherwise and labs look okay and therefore recommended continued observation.  She was very pleased with this.  Bilateral cervical supraclavicular and axillary LAD  CT NCAP, 2/28/2025: Progression.  Most noted as large left lateral pelvic node mass adjacent to the left acetabulum 6.7 x 2.8 cm from 5.1 x 1.7 cm on 1/24/2024  3/7/2025: Still has bilateral cervical, supraclavicular, axillary LAD.  Asymptomatic.  Recommend continued observation.  5/30/2025: Still with palpable LAD, elevated WBC and mildly low plt.  Asymptomatic.  Continue observation    *Leukocytosis and lymphocytosis due to SLL  8/23/2024: First elevation, 14.4.  WBC 16.1 today    *Thrombocytopenia  New issue on 2/28/2025, .  Previously normal  5/30/2025: Plt 127, IPF 3%.    *Atrial fibrillation, on Xarelto through cardiology  Xarelto can increase the risk of bleeding which can cause anemia.  Monitor.  No bleeding    *Social  5/30/2025: Patient's granddaughter, 45, in [place], has breast cancer and is on neoadjuvant chemotherapy planning resection.  Patient plans to stay with her for a while for the surgery    Plan  MD, 2 units, roughly 3 months. 1 week prior: CT neck, chest, abdomen, pelvis, without IV or oral contrast, CBC, CMP, IPF, B12, RBC folate. 2 units. 2 units.  2 unit   We will tentatively plan on maintaining CT NCAP without IV or oral contrast every 6 months (September, March)  (Creatinine has been around 1.2 and this 84-year-old.  Therefore, avoid CT IV contrast)  (Prior diarrhea with oral contrast)  (Checking B12 and RBC folate at next visit due to low plt)    I am following her longitudinally.

## 2025-06-05 ENCOUNTER — TRANSCRIBE ORDERS (OUTPATIENT)
Dept: ADMINISTRATIVE | Facility: HOSPITAL | Age: 85
End: 2025-06-05
Payer: MEDICARE

## 2025-06-05 DIAGNOSIS — Z12.31 BREAST CANCER SCREENING BY MAMMOGRAM: Primary | ICD-10-CM

## 2025-06-10 RX ORDER — AMLODIPINE BESYLATE 5 MG/1
TABLET ORAL
Qty: 90 TABLET | Refills: 0 | Status: SHIPPED | OUTPATIENT
Start: 2025-06-10

## 2025-06-18 ENCOUNTER — HOSPITAL ENCOUNTER (OUTPATIENT)
Dept: MAMMOGRAPHY | Facility: HOSPITAL | Age: 85
Discharge: HOME | End: 2025-06-18
Admitting: INTERNAL MEDICINE
Payer: MEDICARE

## 2025-06-18 DIAGNOSIS — Z12.31 BREAST CANCER SCREENING BY MAMMOGRAM: ICD-10-CM

## 2025-06-18 PROCEDURE — 77067 SCR MAMMO BI INCL CAD: CPT

## 2025-06-18 PROCEDURE — 77063 BREAST TOMOSYNTHESIS BI: CPT

## 2025-06-22 ENCOUNTER — DOCUMENTATION (OUTPATIENT)
Dept: ONCOLOGY | Facility: CLINIC | Age: 85
End: 2025-06-22
Payer: MEDICARE

## 2025-06-22 NOTE — PROGRESS NOTES
Her mammogram reads as enlarged bilateral axillary lymph nodes  related to known diagnosis of lymphoma. This shows progression since  8/4/2023.     (Note they said nodes are bigger than 2 years ago (aug 2023)    We know she's been progressing on ct scans, but if she's doing ok otherwise, plan to continue observation. So I don't think we need to move up her appt or scans    ===View-only below this line===  ----- Message -----  From: Becky Mccrary APRN  Sent: 6/19/2025   1:01 PM EDT  To: Sina Rainey II, MD; Aliza House RN    For you to address when you return Monday.    I see her mammo results in your inbasket.  Looks like worsening axillary lymphadenopathy concerning for progression.  Last CT 2/2025, next CT 8/2025.  Want to move up CT and visit with you?    Copying Jessenia so she can place orders and adjust with scheduling if needed.    Thanks  ----- Message -----  From: [employer], Rad Results [facility] In  Sent: 6/18/2025   9:24 AM EDT  To: Sina Rainey II, MD

## 2025-07-07 ENCOUNTER — TELEPHONE (OUTPATIENT)
Dept: ONCOLOGY | Facility: CLINIC | Age: 85
End: 2025-07-07
Payer: MEDICARE

## 2025-07-09 NOTE — TELEPHONE ENCOUNTER
Caller: Tessie Mckeon    Relationship: Self    Best call back number: [phone]    What is the best time to reach you: ASAP    Who are you requesting to speak with (clinical staff, provider,  specific staff member): JUDITH    What was the call regarding: R/S - PREVIOUS TE - PT AWARE THAT FIRST TE WAS RECEIVED & SHE WOULD BE GETTING A CALL BACK TO R/S    Is it okay if the provider responds through MyChart: NO

## 2025-08-20 RX ORDER — AMLODIPINE BESYLATE 5 MG/1
TABLET ORAL
Qty: 90 TABLET | Refills: 1 | Status: SHIPPED | OUTPATIENT
Start: 2025-08-20